# Patient Record
Sex: FEMALE | Race: BLACK OR AFRICAN AMERICAN | NOT HISPANIC OR LATINO | Employment: OTHER | URBAN - METROPOLITAN AREA
[De-identification: names, ages, dates, MRNs, and addresses within clinical notes are randomized per-mention and may not be internally consistent; named-entity substitution may affect disease eponyms.]

---

## 2017-12-30 ENCOUNTER — ALLSCRIPTS OFFICE VISIT (OUTPATIENT)
Dept: OTHER | Facility: OTHER | Age: 62
End: 2017-12-30

## 2018-01-01 NOTE — PROGRESS NOTES
Assessment   1  Acute bronchitis due to other specified organisms (466 0) (J20 8)    Plan   Acute bronchitis due to other specified organisms    · Benzonatate 200 MG Oral Capsule; TAKE 1 CAPSULE 3 TIMES DAILY AS    NEEDED   · Doxycycline Hyclate 100 MG Oral Capsule; Take 1 capsule twice daily  Contusion of right knee, initial encounter    · Meloxicam 15 MG Oral Tablet; TAKE 1 TABLET DAILY WITH FOOD  Localized swelling of both lower legs    · DANA Stockings; Status:Complete;   Done: [id number]    Discussion/Summary      69-year-old female here today for concern of persistent 2 weeks of intermittent fever, constitutional symptoms and upper respiratory symptoms  Based on persistency of symptoms I will have her complete a course of antibiotics, doxycycline b i d  times 10 days  I also prescribed her cough medicine, benzonatate cough pills, to suppress cough  She was advised to use Tylenol as needed for aches/pains or fever  She is to try Mucinex as well as a steroid nasal spray like fluticasone to help with runny nose and congestion  We will see how she does through the course of the next week or so and was advised to call or follow up in the office should symptoms persist or worsen despite treatment  Possible side effects of new medications were reviewed with the patient/guardian today  The treatment plan was reviewed with the patient/guardian  The patient/guardian understands and agrees with the treatment plan      Chief Complaint   Pt presents for cough, fever, body aches, sob x2wks  Pt states shes taken Advil, Tylenol, alkazeler, and Mucinex w/ little relief  Pt c/o of R leg pain x2d after having leg closed in door  History of Present Illness   HPI: 60y/o female here today for URI sxs for past 2 weeks  Reports fevers, chills, body aches  Fevers 99 last week and now 102  Also coughing worse, deep, chest congestion  Does feel SOB  She has tried advil cold and sinus, alkaseltzer cold, mucinex  takes tylenol  Denies nasal sxs or ear pain  Review of Systems        Constitutional: as noted in HPI       ENT: as noted in HPI  Cardiovascular: no complaints of slow or fast heart rate, no chest pain, no palpitations, no leg claudication or lower extremity edema  Respiratory: as noted in HPI  Gastrointestinal: no complaints of abdominal pain, no constipation, no nausea or diarrhea, no vomiting, no bloody stools  Active Problems   1  Menopause (627 2) (Z78 0)    Past Medical History   1  History of Encounter for screening mammogram for malignant neoplasm of breast     (V76 12) (Z12 31)   2  History of Female pelvic pain (625 9) (R10 2)   3  History of vaginal discharge (V13 29) (Z87 42)   4  History of Vaginal irritation (623 9) (N89 8)   5  History of Vaginal odor (625 8) (N89 8)   6  History of Vaginal pain (625 9) (R10 2)    Family History   Mother    1  Family history of Breast cancer (174 9) (C50 919)   2  Family history of diabetes mellitus in mother (V18 0) (Z83 3)  Maternal Grandmother    3  Family history of Breast cancer (174 9) (C50 919)  Paternal Grandmother    4  Family history of diabetes mellitus in mother (V18 0) (Z83 3)  Family History    5  Family history of malignant neoplasm (V16 9) (Z80 9)    Social History    · Never a smoker   · No alcohol use  The social history was reviewed and updated today  Surgical History   1  History of  Section   2  History of Hysterectomy    Current Meds      The medication list was reviewed and updated today  Allergies   1   Statins    Vitals    Recorded: [id number] 10:44AM   Temperature 100 9 F, Tympanic   Heart Rate 92   Pulse Quality Normal   Respiration 16   Systolic 882, LUE, Sitting   Diastolic 76, LUE, Sitting   Height 5 ft 0 5 in   Weight 165 lb 9 oz   BMI Calculated 31 8   BSA Calculated 1 73   O2 Saturation 99, RA   LMP Menopause   Pain Scale 8     Physical Exam        Constitutional      General appearance: Abnormal  acutely ill,-- comfortable,-- appears tired-- and-- appearance reflects stated age  Ears, Nose, Mouth, and Throat      External inspection of ears and nose: Normal        Otoscopic examination: Tympanic membranes translucent with normal light reflex  Canals patent without erythema  Nasal mucosa, septum, and turbinates: Abnormal   normal nasal turbinates  There was a mucoid discharge from both nares  The bilateral nasal mucosa was boggy,-- edematous-- and-- red  Oropharynx: Abnormal  -- PND  Pulmonary      Respiratory effort: Abnormal  -- occasional dry hacking cough  Auscultation of lungs: Clear to auscultation  Cardiovascular      Auscultation of heart: Normal rate and rhythm, normal S1 and S2, without murmurs  Lymphatic      Palpation of lymph nodes in neck: No lymphadenopathy  Psychiatric      Orientation to person, place, and time: Normal        Mood and affect: Normal        Additional Exam:  vitals reviewed - febrile  Results/Data   PHQ-9 Adult Depression Screening [id number] 10:54AM User, Ahs      Test Name Result Flag Reference   PHQ-9 Adult Depression Score 3     Over the last two weeks, how often have you been bothered by any of the following problems? Little interest or pleasure in doing things: Not at all - 0     Feeling down, depressed, or hopeless: Not at all - 0     Trouble falling or staying asleep, or sleeping too much: More than half the days - 2     Feeling tired or having little energy: Several days - 1     Poor appetite or over eating: Not at all - 0     Feeling bad about yourself - or that you are a failure or have let yourself or your family down: Not at all - 0     Trouble concentrating on things, such as reading the newspaper or watching television: Not at all - 0     Moving or speaking so slowly that other people could have noticed   Or the opposite -  being so fidgety or restless that you have been moving around a lot more than usual: Not at all - 0     Thoughts that you would be better off dead, or of hurting yourself in some way: Not at all - 0   PHQ-9 Adult Depression Screening Negative     PHQ-9 Difficulty Level Not difficult at all     PHQ-9 Severity Minimal Depression          Signatures    Electronically signed by : Angie Cevallos, [facility]; Dec 30 2017  1:47PM EST                       (Author)     Electronically signed by :  Andreina Leyva DO; Dec 31 2017  1:55PM EST                       (Author)

## 2018-01-01 NOTE — PROGRESS NOTES
Assessment   1  Acute bronchitis due to other specified organisms (466 0) (J20 8)    Plan   Acute bronchitis due to other specified organisms    · Benzonatate 200 MG Oral Capsule; TAKE 1 CAPSULE 3 TIMES DAILY AS    NEEDED   · Doxycycline Hyclate 100 MG Oral Capsule; Take 1 capsule twice daily  Contusion of right knee, initial encounter    · Meloxicam 15 MG Oral Tablet; TAKE 1 TABLET DAILY WITH FOOD  Localized swelling of both lower legs    · AVERY Stockings; Status:Complete;   Done: [id number]    Discussion/Summary      16-year-old female here today for evaluation of a right medial knee contusion after she states her knee was accidentally struck by a moving car door about 2-3 days ago  She has a slightly ecchymotic, swollen bruise along her right medial knee with no other obvious signs of trauma  She is able to move her knee without any pain or instability though the affected area is tender  At this point I likely suspect a contusion with reassurance given  She is requesting an anti-inflammatory so I will have her use meloxicam once daily with food for the next week or 2 based on how she feels in her pain level  She is able to bear weight fine and I do not feel any further imaging or evaluation is necessary at this present time based on her current exam  Side effects and risks were discussed with meloxicam and was advised to take with food  We will see how she does through the course of the next week or 2 and she was advised to follow up with any concerns  also states that she would like a prescription for Avery stockings to take to a medical supply store for measurements  She states that at times she gets problems with some swelling in her legs after standing all day at work  Printed prescription for that given  Possible side effects of new medications were reviewed with the patient/guardian today  The treatment plan was reviewed with the patient/guardian   The patient/guardian understands and agrees with the treatment plan      Chief Complaint   hit right knee on car door a few days ago, sore      History of Present Illness   HPI: 60y/o female here today for black and blue right medial knee, happened about 2-3 days ago  Slammed right knee into the car door  hx of right knee surgery  area tender, swollen, painful  no instability  wants DANA stockings script to take to medical supply store  states she stands all day  Review of Systems        Constitutional: No fever, no chills, feels well, no tiredness, no recent weight gain or loss  Cardiovascular: lower extremity edema  Respiratory: no complaints of shortness of breath, no wheezing, no dyspnea on exertion, no orthopnea or PND  Gastrointestinal: no complaints of abdominal pain, no constipation, no nausea or diarrhea, no vomiting, no bloody stools  Genitourinary: no complaints of dysuria, no incontinence, no pelvic pain, no dysmenorrhea, no vaginal discharge or abnormal vaginal bleeding  Musculoskeletal: as noted in HPI  Integumentary: as noted in HPI  Active Problems   1  Influenza vaccine needed (V04 81) (Z23)   2  Menopause (627 2) (Z78 0)    Past Medical History   1  History of Encounter for screening mammogram for malignant neoplasm of breast     (V76 12) (Z12 31)   2  History of Female pelvic pain (625 9) (R10 2)   3  History of vaginal discharge (V13 29) (Z87 42)   4  History of Vaginal irritation (623 9) (N89 8)   5  History of Vaginal odor (625 8) (N89 8)   6  History of Vaginal pain (625 9) (R10 2)    Family History   Mother    1  Family history of Breast cancer (174 9) (C50 919)   2  Family history of diabetes mellitus in mother (V18 0) (Z83 3)  Maternal Grandmother    3  Family history of Breast cancer (174 9) (C50 919)  Paternal Grandmother    4  Family history of diabetes mellitus in mother (V18 0) (Z83 3)  Family History    5   Family history of malignant neoplasm (V16 9) (Z80 9)    Social History    · Never a smoker   · No alcohol use  The social history was reviewed and updated today  Surgical History   1  History of  Section   2  History of Hysterectomy  Surgical History Reviewed: The surgical history was reviewed and updated today  Current Meds      The medication list was reviewed and updated today  Allergies   1  Statins    Vitals    Recorded: 06Ini5766 10:44AM   Temperature 100 9 F, Tympanic   Heart Rate 92   Pulse Quality Normal   Respiration 16   Systolic 038, LUE, Sitting   Diastolic 76, LUE, Sitting   Height 5 ft 0 5 in   Weight 165 lb 9 oz   BMI Calculated 31 8   BSA Calculated 1 73   O2 Saturation 99, RA   LMP Menopause   Pain Scale 8     Physical Exam        Constitutional      General appearance: No acute distress, well appearing and well nourished  comfortable  Cardiovascular      Auscultation of heart: Normal rate and rhythm, normal S1 and S2, without murmurs  Examination of extremities for edema and/or varicosities: Normal        Carotid pulses: Normal        Lymphatic      Palpation of lymph nodes in neck: No lymphadenopathy  Musculoskeletal      Gait and station: Normal        Inspection/palpation of joints, bones, and muscles: Abnormal  -- small area of ecchymosis right medial knee, tender to touch  no other visible trauma noted  scar noted anterior right knee  normal movements of right knee  no instability  Skin      Skin and subcutaneous tissue: Abnormal  -- ecchymosis as above  Psychiatric      Orientation to person, place, and time: Normal        Mood and affect: Normal        Additional Exam:  vitals reviewed  Results/Data   PHQ-9 Adult Depression Screening [id number] 10:54AM User, Ahs      Test Name Result Flag Reference   PHQ-9 Adult Depression Score 3     Over the last two weeks, how often have you been bothered by any of the following problems?      Little interest or pleasure in doing things: Not at all - 0     Feeling down, depressed, or hopeless: Not at all - 0     Trouble falling or staying asleep, or sleeping too much: More than half the days - 2     Feeling tired or having little energy: Several days - 1     Poor appetite or over eating: Not at all - 0     Feeling bad about yourself - or that you are a failure or have let yourself or your family down: Not at all - 0     Trouble concentrating on things, such as reading the newspaper or watching television: Not at all - 0     Moving or speaking so slowly that other people could have noticed  Or the opposite -  being so fidgety or restless that you have been moving around a lot more than usual: Not at all - 0     Thoughts that you would be better off dead, or of hurting yourself in some way: Not at all - 0   PHQ-9 Adult Depression Screening Negative     PHQ-9 Difficulty Level Not difficult at all     PHQ-9 Severity Minimal Depression          Signatures    Electronically signed by : Bee Officer, [facility]; Dec 30 2017  1:44PM EST                       (Author)     Electronically signed by :  Emerson Stephen DO; Dec 31 2017  1:55PM EST                       (Author)

## 2018-01-23 VITALS
OXYGEN SATURATION: 99 % | TEMPERATURE: 100.9 F | HEIGHT: 61 IN | DIASTOLIC BLOOD PRESSURE: 76 MMHG | BODY MASS INDEX: 31.26 KG/M2 | SYSTOLIC BLOOD PRESSURE: 110 MMHG | WEIGHT: 165.56 LBS | RESPIRATION RATE: 16 BRPM | HEART RATE: 92 BPM

## 2019-01-18 ENCOUNTER — OFFICE VISIT (OUTPATIENT)
Dept: FAMILY MEDICINE CLINIC | Facility: CLINIC | Age: 64
End: 2019-01-18
Payer: COMMERCIAL

## 2019-01-18 VITALS
RESPIRATION RATE: 15 BRPM | HEART RATE: 69 BPM | TEMPERATURE: 98.9 F | DIASTOLIC BLOOD PRESSURE: 72 MMHG | OXYGEN SATURATION: 97 % | HEIGHT: 60 IN | SYSTOLIC BLOOD PRESSURE: 128 MMHG | WEIGHT: 168.5 LBS | BODY MASS INDEX: 33.08 KG/M2

## 2019-01-18 DIAGNOSIS — J40 BRONCHITIS: Primary | ICD-10-CM

## 2019-01-18 PROCEDURE — 1036F TOBACCO NON-USER: CPT | Performed by: NURSE PRACTITIONER

## 2019-01-18 PROCEDURE — 3008F BODY MASS INDEX DOCD: CPT | Performed by: NURSE PRACTITIONER

## 2019-01-18 PROCEDURE — 99213 OFFICE O/P EST LOW 20 MIN: CPT | Performed by: NURSE PRACTITIONER

## 2019-01-18 RX ORDER — AZITHROMYCIN 250 MG/1
TABLET, FILM COATED ORAL
Qty: 6 TABLET | Refills: 0 | Status: SHIPPED | OUTPATIENT
Start: 2019-01-18 | End: 2019-01-22

## 2019-01-18 RX ORDER — PREDNISONE 10 MG/1
TABLET ORAL
Qty: 15 TABLET | Refills: 0 | Status: SHIPPED | OUTPATIENT
Start: 2019-01-18 | End: 2019-10-25

## 2019-01-20 PROBLEM — M19.90 OSTEOARTHRITIS: Status: ACTIVE | Noted: 2019-01-20

## 2019-01-20 PROBLEM — K63.5 COLON POLYPS: Status: ACTIVE | Noted: 2019-01-20

## 2019-01-20 PROBLEM — R22.43 LOCALIZED SWELLING OF BOTH LOWER LEGS: Status: ACTIVE | Noted: 2017-12-30

## 2019-01-21 NOTE — PROGRESS NOTES
[facility]    ASSESSMENT AND PLAN     1  Bronchitis  21-year-old female presents today with signs and symptoms suggestive of bronchitis  Physical assessment is as documented below  Rx given today for azithromycin and a short 5 day course prednisone taper  Risks and benefits reviewed  Symptom management reviewed  Patient is to return to the office if her symptoms persist and/or worsen  - azithromycin (ZITHROMAX) 250 mg tablet; Take 2 tablets today then 1 tablet daily x 4 days  Dispense: 6 tablet; Refill: 0  - predniSONE 10 mg tablet; 50mg (5 tabs) x1 day, 40 mg x1 day, 30 mg x1 day, 20mg x1 day, 10mg x1 day  Dispense: 15 tablet; Refill: 0            SUBJECTIVE       Patient ID: Gabriele Guerrero is a 58 y o  female  Chief Complaint   Patient presents with    URI     Pt c/o cough with chest tightness with on and off SOB x1 week  HISTORY OF PRESENT ILLNESS    Patient presents today with a one-week history of productive cough and chest tightness  Last air to she has noticed some wheezing     She has trialed some OTC cold medicine without relief  She denies fever  Denies any GI distress          The following portions of the patient's history were reviewed and updated as appropriate: allergies, current medications, past medical history and problem list     REVIEW OF SYSTEMS  Review of Systems   Constitutional: Positive for activity change and fatigue  Negative for appetite change and fever  HENT: Positive for congestion, sinus pain, sinus pressure and sore throat  Negative for ear pain and postnasal drip  Respiratory: Positive for cough, chest tightness and wheezing  Negative for shortness of breath  Cardiovascular: Negative  Gastrointestinal: Negative  Psychiatric/Behavioral: Negative          OBJECTIVE      VITAL SIGNS  /72 (BP Location: Left arm, Patient Position: Sitting, Cuff Size: Standard)   Pulse 69   Temp 98 9 °F (37 2 °C) (Tympanic)   Resp 15   Ht 4' 11 65" (1 515 m)   Wt 76 4 kg (168 lb 8 oz)   LMP  (LMP Unknown)   SpO2 97%   BMI 33 30 kg/m²       PHYSICAL EXAMINATION   Physical Exam   Constitutional: She is oriented to person, place, and time  She appears well-developed and well-nourished  HENT:   Head: Normocephalic  Right Ear: Hearing, tympanic membrane, external ear and ear canal normal  No middle ear effusion  Left Ear: Hearing, tympanic membrane, external ear and ear canal normal   No middle ear effusion  Nose: No mucosal edema  Right sinus exhibits no maxillary sinus tenderness and no frontal sinus tenderness  Left sinus exhibits no maxillary sinus tenderness and no frontal sinus tenderness  Mouth/Throat: Oropharynx is clear and moist    Cardiovascular: Normal rate and regular rhythm  Pulmonary/Chest: Effort normal  No respiratory distress  She has no decreased breath sounds  She has wheezes in the right lower field and the left lower field  She has no rhonchi  She has no rales  Musculoskeletal: Normal range of motion  Lymphadenopathy:        Head (right side): No submental, no submandibular, no tonsillar, no preauricular, no posterior auricular and no occipital adenopathy present  Head (left side): Tonsillar adenopathy present  No submental, no submandibular, no preauricular, no posterior auricular and no occipital adenopathy present  She has no cervical adenopathy  Neurological: She is alert and oriented to person, place, and time  Skin: Skin is warm, dry and intact  Psychiatric: She has a normal mood and affect  Her speech is normal and behavior is normal  Judgment and thought content normal  Cognition and memory are normal    Nursing note and vitals reviewed

## 2019-05-16 ENCOUNTER — TELEPHONE (OUTPATIENT)
Dept: FAMILY MEDICINE CLINIC | Facility: CLINIC | Age: 64
End: 2019-05-16

## 2019-10-25 ENCOUNTER — OFFICE VISIT (OUTPATIENT)
Dept: FAMILY MEDICINE CLINIC | Facility: CLINIC | Age: 64
End: 2019-10-25
Payer: COMMERCIAL

## 2019-10-25 VITALS
HEART RATE: 71 BPM | BODY MASS INDEX: 34.27 KG/M2 | HEIGHT: 59 IN | WEIGHT: 170 LBS | OXYGEN SATURATION: 98 % | TEMPERATURE: 98 F | SYSTOLIC BLOOD PRESSURE: 120 MMHG | RESPIRATION RATE: 16 BRPM | DIASTOLIC BLOOD PRESSURE: 70 MMHG

## 2019-10-25 DIAGNOSIS — J40 BRONCHITIS: Primary | ICD-10-CM

## 2019-10-25 PROCEDURE — 1101F PT FALLS ASSESS-DOCD LE1/YR: CPT | Performed by: FAMILY MEDICINE

## 2019-10-25 PROCEDURE — 3008F BODY MASS INDEX DOCD: CPT | Performed by: FAMILY MEDICINE

## 2019-10-25 PROCEDURE — 99213 OFFICE O/P EST LOW 20 MIN: CPT | Performed by: FAMILY MEDICINE

## 2019-10-25 RX ORDER — PROMETHAZINE HYDROCHLORIDE AND CODEINE PHOSPHATE 6.25; 1 MG/5ML; MG/5ML
5 SYRUP ORAL EVERY 4 HOURS PRN
Qty: 120 ML | Refills: 0 | Status: SHIPPED | OUTPATIENT
Start: 2019-10-25 | End: 2019-12-20 | Stop reason: SDUPTHER

## 2019-10-25 RX ORDER — CEFDINIR 300 MG/1
300 CAPSULE ORAL EVERY 12 HOURS SCHEDULED
Qty: 20 CAPSULE | Refills: 0 | Status: SHIPPED | OUTPATIENT
Start: 2019-10-25 | End: 2019-11-04

## 2019-10-25 NOTE — PROGRESS NOTES
[facility]      NAME: Cherri Alberts  AGE: 71 y o  SEX: female  : 1955   MRN: [id number]    DATE: 10/25/2019  TIME: 10:21 AM    Assessment and Plan     Problem List Items Addressed This Visit     None      Visit Diagnoses     Bronchitis    -  Primary    Relevant Medications    cefdinir (OMNICEF) 300 mg capsule    promethazine-codeine (PHENERGAN WITH CODEINE) 6 25-10 mg/5 mL syrup              Return to office in:   P r n  Chief Complaint     Chief Complaint   Patient presents with    Cough     bodyache x 10 days       History of Present Illness     Patient has upper respiratory symptoms progress over over 10 days but most severe in the last 2-3 with fever chills productive cough and some shortness of breath  Taking over-the-counter cold remedies, Mucinex and decongestant  The following portions of the patient's history were reviewed and updated as appropriate: allergies, current medications, past family history, past medical history, past social history, past surgical history and problem list     Review of Systems   Review of Systems   Constitutional: Positive for fatigue and fever  HENT: Positive for postnasal drip, sinus pressure and sinus pain  Respiratory: Positive for cough and shortness of breath  Active Problem List     Patient Active Problem List   Diagnosis    Colon polyps    Hypercholesterolemia    Localized swelling of both lower legs    Menopause    Osteoarthritis    Atrophic vaginitis       Objective   /70 (BP Location: Left arm, Patient Position: Sitting, Cuff Size: Adult)   Pulse 71   Temp 98 °F (36 7 °C) (Tympanic)   Resp 16   Ht 4' 11 45" (1 51 m)   Wt 77 1 kg (170 lb)   SpO2 98%   BMI 33 82 kg/m²     Physical Exam   Constitutional: She is oriented to person, place, and time  She appears well-developed and well-nourished  No distress  HENT:   Head: Normocephalic and atraumatic     Injected posterior pharynx with postnasal drainage   Eyes: Pupils are equal, round, and reactive to light  Conjunctivae are normal  Right eye exhibits no discharge  Neck: Normal range of motion  No thyromegaly present  Cardiovascular: Normal rate and regular rhythm  Pulmonary/Chest: Effort normal  No respiratory distress  Coarse breath sounds with scattered rhonchi  Lymphadenopathy:     She has no cervical adenopathy  Neurological: She is alert and oriented to person, place, and time  Skin: Skin is warm and dry  She is not diaphoretic  Psychiatric: She has a normal mood and affect  Her behavior is normal  Judgment and thought content normal    Nursing note and vitals reviewed          Current Medications     Current Outpatient Medications:     aspirin 81 MG tablet, Take 81 mg by mouth, Disp: , Rfl:     cefdinir (OMNICEF) 300 mg capsule, Take 1 capsule (300 mg total) by mouth every 12 (twelve) hours for 10 days, Disp: 20 capsule, Rfl: 0    promethazine-codeine (PHENERGAN WITH CODEINE) 6 25-10 mg/5 mL syrup, Take 5 mL by mouth every 4 (four) hours as needed for cough, Disp: 120 mL, Rfl: 0    Health Maintenance     Health Maintenance   Topic Date Due    Hepatitis C Screening  1955    CRC Screening: Colonoscopy  1955    BMI: Followup Plan  09/15/1972    MAMMOGRAM  11/08/2017    Pneumococcal Vaccine: 65+ Years (1 of 2 - PCV13) 09/15/2019    Fall Risk  10/25/2020    Depression Screening PHQ  10/25/2020    Urinary Incontinence Screening  10/25/2020    BMI: Adult  10/25/2020    DTaP,Tdap,and Td Vaccines (2 - Td) 08/17/2028    INFLUENZA VACCINE  Completed    Pneumococcal Vaccine: Pediatrics (0 to 5 Years) and At-Risk Patients (6 to 59 Years)  Aged Out    HEPATITIS B VACCINES  Aged [employer] History   Administered Date(s) Administered    Hep A, ped/adol, 2 dose 01/13/2004    INFLUENZA 09/01/2013, 10/22/2013, 10/10/2014, 10/30/2017, 10/02/2018, 10/09/2018, 10/02/2019    Influenza Quadrivalent, 6-35 Months IM 10/01/2017    Td (adult), Unspecified 01/13/2005    Tdap 08/17/2018       Evelyn Willis DO  [facility]

## 2019-12-20 ENCOUNTER — OFFICE VISIT (OUTPATIENT)
Dept: FAMILY MEDICINE CLINIC | Facility: CLINIC | Age: 64
End: 2019-12-20
Payer: COMMERCIAL

## 2019-12-20 VITALS
WEIGHT: 167.8 LBS | SYSTOLIC BLOOD PRESSURE: 120 MMHG | HEIGHT: 59 IN | DIASTOLIC BLOOD PRESSURE: 66 MMHG | OXYGEN SATURATION: 92 % | HEART RATE: 65 BPM | TEMPERATURE: 98.2 F | BODY MASS INDEX: 33.83 KG/M2

## 2019-12-20 DIAGNOSIS — J32.9 SINUSITIS, UNSPECIFIED CHRONICITY, UNSPECIFIED LOCATION: Primary | ICD-10-CM

## 2019-12-20 PROCEDURE — 99214 OFFICE O/P EST MOD 30 MIN: CPT | Performed by: FAMILY MEDICINE

## 2019-12-20 RX ORDER — PROMETHAZINE HYDROCHLORIDE AND CODEINE PHOSPHATE 6.25; 1 MG/5ML; MG/5ML
5 SYRUP ORAL EVERY 4 HOURS PRN
Qty: 120 ML | Refills: 0 | Status: SHIPPED | OUTPATIENT
Start: 2019-12-20 | End: 2020-08-29

## 2019-12-20 RX ORDER — CEFDINIR 300 MG/1
300 CAPSULE ORAL EVERY 12 HOURS SCHEDULED
Qty: 20 CAPSULE | Refills: 0 | Status: SHIPPED | OUTPATIENT
Start: 2019-12-20 | End: 2019-12-30

## 2019-12-20 NOTE — PROGRESS NOTES
[facility]      NAME: Ko Ugarte  AGE: 71 y o  SEX: female  : 1955   MRN: [id number]    DATE: 2019  TIME: 4:40 PM    Assessment and Plan     Problem List Items Addressed This Visit     None      Visit Diagnoses     Sinusitis, unspecified chronicity, unspecified location    -  Primary    Relevant Medications    cefdinir (OMNICEF) 300 mg capsule    promethazine-codeine (PHENERGAN WITH CODEINE) 6 25-10 mg/5 mL syrup              Return to office in:   P r n  Chief Complaint     Chief Complaint   Patient presents with    Cough     pt c/o cough, nasal congestion, fever and bodyaches for about one week now   History of Present Illness     Patient complains of sore throat postnasal drainage and dry cough  Low-grade fever  Taking over-the-counter cough medicine and cold medication without improvement  Symptoms present 4-5 days  The following portions of the patient's history were reviewed and updated as appropriate: allergies, current medications, past family history, past medical history, past social history, past surgical history and problem list     Review of Systems   Review of Systems   Constitutional: Negative  HENT: Positive for congestion, postnasal drip, sinus pressure and sore throat  Respiratory: Positive for cough  Cardiovascular: Negative  Gastrointestinal: Negative  Genitourinary: Negative  Musculoskeletal: Negative  Psychiatric/Behavioral: Negative  Active Problem List     Patient Active Problem List   Diagnosis    Colon polyps    Hypercholesterolemia    Localized swelling of both lower legs    Menopause    Osteoarthritis    Atrophic vaginitis       Objective   /66 (BP Location: Left arm, Patient Position: Sitting, Cuff Size: Adult)   Pulse 65   Temp 98 2 °F (36 8 °C) (Tympanic)   Ht 4' 11" (1 499 m)   Wt 76 1 kg (167 lb 12 8 oz)   LMP  (LMP Unknown)   SpO2 92%   Breastfeeding?  No   BMI 33 89 kg/m² Physical Exam   Constitutional: She is oriented to person, place, and time  She appears well-developed and well-nourished  No distress  HENT:   Head: Normocephalic and atraumatic  Injected posterior pharynx with opaque postnasal drainage   Eyes: Pupils are equal, round, and reactive to light  Conjunctivae are normal  Right eye exhibits no discharge  Neck: Normal range of motion  No thyromegaly present  Cardiovascular: Normal rate and regular rhythm  Pulmonary/Chest: Effort normal and breath sounds normal  No respiratory distress  Lymphadenopathy:     She has no cervical adenopathy  Neurological: She is alert and oriented to person, place, and time  Skin: Skin is warm and dry  She is not diaphoretic  Psychiatric: She has a normal mood and affect  Her behavior is normal  Judgment and thought content normal    Nursing note and vitals reviewed          Current Medications     Current Outpatient Medications:     aspirin 81 MG tablet, Take 81 mg by mouth, Disp: , Rfl:     cefdinir (OMNICEF) 300 mg capsule, Take 1 capsule (300 mg total) by mouth every 12 (twelve) hours for 10 days, Disp: 20 capsule, Rfl: 0    promethazine-codeine (PHENERGAN WITH CODEINE) 6 25-10 mg/5 mL syrup, Take 5 mL by mouth every 4 (four) hours as needed for cough, Disp: 120 mL, Rfl: 0    Health Maintenance     Health Maintenance   Topic Date Due    Hepatitis C Screening  1955    HIV Screening  09/15/1969    BMI: Followup Plan  09/15/1972    MAMMOGRAM  11/08/2017    Fall Risk  10/25/2020    Depression Screening PHQ  10/25/2020    Urinary Incontinence Screening  10/25/2020    Pneumococcal Vaccine: 65+ Years (2 of 2 - PPSV23) 11/07/2020    BMI: Adult  12/20/2020    DTaP,Tdap,and Td Vaccines (2 - Td) 08/17/2028    CRC Screening: Colonoscopy  08/02/2029    Influenza Vaccine  Completed    Pneumococcal Vaccine: Pediatrics (0 to 5 Years) and At-Risk Patients (6 to 59 Years)  Aged Out    HIB Vaccine  Aged Maikel Weaver Hepatitis B Vaccine  Aged Out    IPV Vaccine  Aged Out    Hepatitis A Vaccine  Aged Out    Meningococcal ACWY Vaccine  Aged Out    HPV Vaccine  Aged Out     Immunization History   Administered Date(s) Administered    Hep A, ped/adol, 2 dose 01/13/2004    INFLUENZA 09/01/2013, 10/22/2013, 10/10/2014, 10/30/2017, 10/02/2018, 10/09/2018, 10/02/2019    Influenza Quadrivalent, 6-35 Months IM 10/01/2017    Pneumococcal Conjugate 13-Valent 11/07/2019    Td (adult), Unspecified 01/13/2005    Tdap 08/17/2018       Bryson Rodgers, DO  [facility]

## 2019-12-20 NOTE — LETTER
December 20, 2019     Patient: Brii Hall   YOB: 1955   Date of Visit: 12/20/2019       To Whom it May Concern:    Juliet Strickland is under my professional care  She was seen in my office on 12/20/2019  She may return to work on 12/23  If you have any questions or concerns, please don't hesitate to call           Sincerely,          Josse Cowan,         CC: No Recipients

## 2020-08-29 ENCOUNTER — OFFICE VISIT (OUTPATIENT)
Dept: FAMILY MEDICINE CLINIC | Facility: CLINIC | Age: 65
End: 2020-08-29
Payer: COMMERCIAL

## 2020-08-29 VITALS
RESPIRATION RATE: 16 BRPM | SYSTOLIC BLOOD PRESSURE: 120 MMHG | TEMPERATURE: 98 F | BODY MASS INDEX: 31.91 KG/M2 | HEART RATE: 70 BPM | DIASTOLIC BLOOD PRESSURE: 70 MMHG | HEIGHT: 61 IN | WEIGHT: 169 LBS | OXYGEN SATURATION: 99 %

## 2020-08-29 DIAGNOSIS — J01.00 ACUTE NON-RECURRENT MAXILLARY SINUSITIS: ICD-10-CM

## 2020-08-29 DIAGNOSIS — Z71.89 ADVICE GIVEN ABOUT COVID-19 VIRUS INFECTION: ICD-10-CM

## 2020-08-29 DIAGNOSIS — G89.29 CHRONIC PAIN OF LEFT KNEE: Primary | ICD-10-CM

## 2020-08-29 DIAGNOSIS — M25.562 CHRONIC PAIN OF LEFT KNEE: Primary | ICD-10-CM

## 2020-08-29 PROCEDURE — 1036F TOBACCO NON-USER: CPT | Performed by: FAMILY MEDICINE

## 2020-08-29 PROCEDURE — 4040F PNEUMOC VAC/ADMIN/RCVD: CPT | Performed by: FAMILY MEDICINE

## 2020-08-29 PROCEDURE — 1160F RVW MEDS BY RX/DR IN RCRD: CPT | Performed by: FAMILY MEDICINE

## 2020-08-29 PROCEDURE — 99214 OFFICE O/P EST MOD 30 MIN: CPT | Performed by: FAMILY MEDICINE

## 2020-08-29 PROCEDURE — 3008F BODY MASS INDEX DOCD: CPT | Performed by: FAMILY MEDICINE

## 2020-08-29 RX ORDER — CEFUROXIME AXETIL 500 MG/1
500 TABLET ORAL EVERY 12 HOURS SCHEDULED
Qty: 20 TABLET | Refills: 0 | Status: SHIPPED | OUTPATIENT
Start: 2020-08-29 | End: 2020-09-08

## 2020-08-29 RX ORDER — FEXOFENADINE HCL 180 MG/1
180 TABLET ORAL DAILY
COMMUNITY
Start: 2020-04-14 | End: 2021-08-14 | Stop reason: ALTCHOICE

## 2020-08-29 RX ORDER — PROMETHAZINE HYDROCHLORIDE AND CODEINE PHOSPHATE 6.25; 1 MG/5ML; MG/5ML
5 SYRUP ORAL EVERY 4 HOURS PRN
Qty: 120 ML | Refills: 0 | Status: SHIPPED | OUTPATIENT
Start: 2020-08-29 | End: 2021-10-27 | Stop reason: ALTCHOICE

## 2020-08-29 RX ORDER — TERBINAFINE HYDROCHLORIDE 250 MG/1
2 TABLET ORAL DAILY
COMMUNITY
Start: 2020-08-26 | End: 2021-10-27 | Stop reason: ALTCHOICE

## 2020-08-29 NOTE — PROGRESS NOTES
[facility]      NAME: Shirin Wheeler  AGE: 71 y o  SEX: female  : 1955   MRN: [id number]    DATE: 2020  TIME: 3:09 PM    Assessment and Plan     Problem List Items Addressed This Visit     Chronic pain of left knee - Primary      Pt presents w/ ongoing L knee pain x years, but lately getting worse  Pt was seen at [facility]  She states she had xrays done approx 2 weeks ago  Has tried OTC meds w/o benefit  I was able to view results of x-ray she had done on   This was read as normal   Therefore, symptoms are most likely soft tissue in origin  Will give Rx for Voltaren gel  We will refer to physical therapy  Possible need for MRI if symptoms persist             Relevant Medications    diclofenac sodium (VOLTAREN) 1 %    Other Relevant Orders    Ambulatory referral to Physical Therapy      Other Visit Diagnoses     Acute non-recurrent maxillary sinusitis         Rx given for Ceftin 500 b i d  times 10 days  Call further problems    Relevant Medications    cefuroxime (CEFTIN) 500 mg tablet    promethazine-codeine (PHENERGAN WITH CODEINE) 6 25-10 mg/5 mL syrup    Advice given about COVID-19 virus infection         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  She states she had xrays done approx 2 weeks ago  Has tried OTC meds w/o benefit  Pt c/o cough, nasal congestion x 3 weeks  Hx seasonal allergies  No fevers  Pt takes care of her 81 yo mother  She is requesting COVID testing as  A precaution  The following portions of the patient's history were reviewed and updated as appropriate: allergies, current medications, past family history, past medical history, past social history, past surgical history and problem list     Review of Systems   Review of Systems   Constitutional: Negative for chills and fever  HENT: Positive for congestion and postnasal drip  Respiratory: Negative  Negative for shortness of breath  Cardiovascular: Negative  Gastrointestinal: Negative  Genitourinary: Negative  Musculoskeletal: Positive for arthralgias  Active Problem List     Patient Active Problem List   Diagnosis    Colon polyps    Hypercholesterolemia    Localized swelling of both lower legs    Menopause    Osteoarthritis    Atrophic vaginitis    Chronic pain of left knee       Objective   /70 (BP Location: Left arm, Patient Position: Sitting, Cuff Size: Large)   Pulse 70   Temp 98 °F (36 7 °C) (Tympanic)   Resp 16   Ht 5' 0 5" (1 537 m)   Wt 76 7 kg (169 lb)   LMP  (LMP Unknown)   SpO2 99%   BMI 32 46 kg/m²     Physical Exam  Constitutional:       Appearance: She is well-developed  Neck:      Musculoskeletal: Normal range of motion and neck supple  Pulmonary:      Effort: Pulmonary effort is normal       Breath sounds: Normal breath sounds           Pertinent Laboratory/Diagnostic Studies:  none    Current Medications     Current Outpatient Medications:     aspirin 81 MG tablet, Take 81 mg by mouth, Disp: , Rfl:     fexofenadine (ALLEGRA) 180 MG tablet, Take 180 mg by mouth daily, Disp: , Rfl:     Omega-3 Fatty Acids (OMEGA 3 PO), Take 1 capsule by mouth daily, Disp: , Rfl:     terbinafine (LamISIL) 250 mg tablet, Take 2 tablets by mouth daily, Disp: , Rfl:     cefuroxime (CEFTIN) 500 mg tablet, Take 1 tablet (500 mg total) by mouth every 12 (twelve) hours for 10 days, Disp: 20 tablet, Rfl: 0    diclofenac sodium (VOLTAREN) 1 %, Apply 4 g topically 3 (three) times a day, Disp: 100 g, Rfl: 3    promethazine-codeine (PHENERGAN WITH CODEINE) 6 25-10 mg/5 mL syrup, Take 5 mL by mouth every 4 (four) hours as needed for cough, Disp: 120 mL, Rfl: 0    Health Maintenance     Health Maintenance   Topic Date Due    Hepatitis C Screening  1955    HIV Screening  09/15/1969    BMI: Followup Plan  09/15/1972    Annual Physical  09/15/1972    Influenza Vaccine  07/01/2020    MAMMOGRAM  09/19/2020    Fall Risk  10/25/2020    Depression Screening PHQ  10/25/2020    Pneumococcal Vaccine: 65+ Years (2 of 2 - PPSV23) 11/07/2020    BMI: Adult  08/29/2021    DTaP,Tdap,and Td Vaccines (2 - Td) 08/17/2028    Colorectal Cancer Screening  08/02/2029    HIB Vaccine  Aged Out    Hepatitis B Vaccine  Aged Out    IPV Vaccine  Aged Out    Hepatitis A Vaccine  Aged Out    Meningococcal ACWY Vaccine  Aged Out    HPV Vaccine  Aged [employer] History   Administered Date(s) Administered    Hep A, ped/adol, 2 dose 01/13/2004    INFLUENZA 09/01/2013, 10/22/2013, 10/10/2014, 10/30/2017, 10/02/2018, 10/09/2018, 10/02/2019    Influenza Quadrivalent, 6-35 Months IM 10/01/2017    Pneumococcal Conjugate 13-Valent 11/07/2019    Td (adult), Unspecified 01/13/2005    Tdap 08/17/2018       Rain Walker DO  [facility]

## 2020-08-29 NOTE — ASSESSMENT & PLAN NOTE
Pt presents w/ ongoing L knee pain x years, but lately getting worse  Pt was seen at [facility]  She states she had xrays done approx 2 weeks ago  Has tried OTC meds w/o benefit  I was able to view results of x-ray she had done on July 25th  This was read as normal   Therefore, symptoms are most likely soft tissue in origin  Will give Rx for Voltaren gel  We will refer to physical therapy    Possible need for MRI if symptoms persist

## 2020-09-02 DIAGNOSIS — Z71.89 ADVICE GIVEN ABOUT COVID-19 VIRUS INFECTION: ICD-10-CM

## 2020-09-02 PROCEDURE — U0003 INFECTIOUS AGENT DETECTION BY NUCLEIC ACID (DNA OR RNA); SEVERE ACUTE RESPIRATORY SYNDROME CORONAVIRUS 2 (SARS-COV-2) (CORONAVIRUS DISEASE [COVID-19]), AMPLIFIED PROBE TECHNIQUE, MAKING USE OF HIGH THROUGHPUT TECHNOLOGIES AS DESCRIBED BY CMS-2020-01-R: HCPCS | Performed by: FAMILY MEDICINE

## 2020-09-03 LAB — SARS-COV-2 RNA SPEC QL NAA+PROBE: NOT DETECTED

## 2020-10-05 ENCOUNTER — EVALUATION (OUTPATIENT)
Dept: PHYSICAL THERAPY | Facility: CLINIC | Age: 65
End: 2020-10-05
Payer: COMMERCIAL

## 2020-10-05 DIAGNOSIS — G89.29 CHRONIC PAIN OF LEFT KNEE: Primary | ICD-10-CM

## 2020-10-05 DIAGNOSIS — M25.562 CHRONIC PAIN OF LEFT KNEE: Primary | ICD-10-CM

## 2020-10-05 PROCEDURE — 97162 PT EVAL MOD COMPLEX 30 MIN: CPT | Performed by: PHYSICAL THERAPIST

## 2020-10-05 PROCEDURE — 97110 THERAPEUTIC EXERCISES: CPT | Performed by: PHYSICAL THERAPIST

## 2020-10-08 ENCOUNTER — APPOINTMENT (OUTPATIENT)
Dept: PHYSICAL THERAPY | Facility: CLINIC | Age: 65
End: 2020-10-08
Payer: COMMERCIAL

## 2020-10-12 ENCOUNTER — OFFICE VISIT (OUTPATIENT)
Dept: PHYSICAL THERAPY | Facility: CLINIC | Age: 65
End: 2020-10-12
Payer: COMMERCIAL

## 2020-10-12 DIAGNOSIS — M25.562 CHRONIC PAIN OF LEFT KNEE: Primary | ICD-10-CM

## 2020-10-12 DIAGNOSIS — G89.29 CHRONIC PAIN OF LEFT KNEE: Primary | ICD-10-CM

## 2020-10-12 PROCEDURE — 97110 THERAPEUTIC EXERCISES: CPT

## 2020-10-12 PROCEDURE — 97112 NEUROMUSCULAR REEDUCATION: CPT

## 2020-10-12 PROCEDURE — 97140 MANUAL THERAPY 1/> REGIONS: CPT

## 2020-10-22 ENCOUNTER — OFFICE VISIT (OUTPATIENT)
Dept: PHYSICAL THERAPY | Facility: CLINIC | Age: 65
End: 2020-10-22
Payer: COMMERCIAL

## 2020-10-22 DIAGNOSIS — G89.29 CHRONIC PAIN OF LEFT KNEE: Primary | ICD-10-CM

## 2020-10-22 DIAGNOSIS — M25.562 CHRONIC PAIN OF LEFT KNEE: Primary | ICD-10-CM

## 2020-10-22 PROCEDURE — 97110 THERAPEUTIC EXERCISES: CPT

## 2020-10-22 PROCEDURE — 97140 MANUAL THERAPY 1/> REGIONS: CPT

## 2020-10-22 PROCEDURE — 97112 NEUROMUSCULAR REEDUCATION: CPT

## 2020-10-26 ENCOUNTER — APPOINTMENT (OUTPATIENT)
Dept: PHYSICAL THERAPY | Facility: CLINIC | Age: 65
End: 2020-10-26
Payer: COMMERCIAL

## 2020-10-29 ENCOUNTER — APPOINTMENT (OUTPATIENT)
Dept: PHYSICAL THERAPY | Facility: CLINIC | Age: 65
End: 2020-10-29
Payer: COMMERCIAL

## 2020-11-23 ENCOUNTER — TELEMEDICINE (OUTPATIENT)
Dept: FAMILY MEDICINE CLINIC | Facility: CLINIC | Age: 65
End: 2020-11-23
Payer: COMMERCIAL

## 2020-11-23 DIAGNOSIS — J01.40 ACUTE NON-RECURRENT PANSINUSITIS: Primary | ICD-10-CM

## 2020-11-23 PROBLEM — R21 RASH OF PENIS: Status: ACTIVE | Noted: 2020-11-23

## 2020-11-23 PROBLEM — R21 RASH OF PENIS: Status: RESOLVED | Noted: 2020-11-23 | Resolved: 2020-11-23

## 2020-11-23 PROBLEM — D22.9 PIGMENTED NEVUS: Status: ACTIVE | Noted: 2020-08-12

## 2020-11-23 PROBLEM — B35.1 ONYCHOMYCOSIS: Status: ACTIVE | Noted: 2020-11-23

## 2020-11-23 PROBLEM — B07.0 PLANTAR WART: Status: ACTIVE | Noted: 2020-11-23

## 2020-11-23 PROBLEM — M20.11 HALLUX VALGUS OF RIGHT FOOT: Status: ACTIVE | Noted: 2020-11-16

## 2020-11-23 PROBLEM — E66.9 OBESITY: Status: ACTIVE | Noted: 2020-11-23

## 2020-11-23 PROCEDURE — 99213 OFFICE O/P EST LOW 20 MIN: CPT | Performed by: FAMILY MEDICINE

## 2020-11-23 PROCEDURE — 1160F RVW MEDS BY RX/DR IN RCRD: CPT | Performed by: FAMILY MEDICINE

## 2020-11-23 PROCEDURE — 1036F TOBACCO NON-USER: CPT | Performed by: FAMILY MEDICINE

## 2020-11-23 RX ORDER — DOXYCYCLINE HYCLATE 100 MG/1
100 CAPSULE ORAL EVERY 12 HOURS SCHEDULED
Qty: 20 CAPSULE | Refills: 0 | Status: SHIPPED | OUTPATIENT
Start: 2020-11-23 | End: 2020-12-03

## 2020-11-24 DIAGNOSIS — J01.40 ACUTE NON-RECURRENT PANSINUSITIS: ICD-10-CM

## 2020-11-24 PROCEDURE — U0003 INFECTIOUS AGENT DETECTION BY NUCLEIC ACID (DNA OR RNA); SEVERE ACUTE RESPIRATORY SYNDROME CORONAVIRUS 2 (SARS-COV-2) (CORONAVIRUS DISEASE [COVID-19]), AMPLIFIED PROBE TECHNIQUE, MAKING USE OF HIGH THROUGHPUT TECHNOLOGIES AS DESCRIBED BY CMS-2020-01-R: HCPCS | Performed by: FAMILY MEDICINE

## 2020-11-26 LAB — SARS-COV-2 RNA SPEC QL NAA+PROBE: NOT DETECTED

## 2020-12-07 ENCOUNTER — TELEMEDICINE (OUTPATIENT)
Dept: FAMILY MEDICINE CLINIC | Facility: CLINIC | Age: 65
End: 2020-12-07
Payer: COMMERCIAL

## 2020-12-07 DIAGNOSIS — R73.09 ELEVATED HEMOGLOBIN A1C: ICD-10-CM

## 2020-12-07 DIAGNOSIS — R05.9 COUGH: Primary | ICD-10-CM

## 2020-12-07 PROCEDURE — 99213 OFFICE O/P EST LOW 20 MIN: CPT | Performed by: FAMILY MEDICINE

## 2020-12-09 ENCOUNTER — APPOINTMENT (OUTPATIENT)
Dept: RADIOLOGY | Facility: CLINIC | Age: 65
End: 2020-12-09
Payer: COMMERCIAL

## 2020-12-09 ENCOUNTER — LAB (OUTPATIENT)
Dept: LAB | Facility: CLINIC | Age: 65
End: 2020-12-09
Payer: COMMERCIAL

## 2020-12-09 DIAGNOSIS — R05.9 COUGH: ICD-10-CM

## 2020-12-09 DIAGNOSIS — R73.09 ELEVATED HEMOGLOBIN A1C: ICD-10-CM

## 2020-12-09 LAB
ALBUMIN SERPL BCP-MCNC: 3.7 G/DL (ref 3.5–5)
ALP SERPL-CCNC: 99 U/L (ref 46–116)
ALT SERPL W P-5'-P-CCNC: 23 U/L (ref 12–78)
ANION GAP SERPL CALCULATED.3IONS-SCNC: 4 MMOL/L (ref 4–13)
AST SERPL W P-5'-P-CCNC: 9 U/L (ref 5–45)
BASOPHILS # BLD AUTO: 0.03 THOUSANDS/ΜL (ref 0–0.1)
BASOPHILS NFR BLD AUTO: 1 % (ref 0–1)
BILIRUB SERPL-MCNC: 0.23 MG/DL (ref 0.2–1)
BUN SERPL-MCNC: 10 MG/DL (ref 5–25)
CALCIUM SERPL-MCNC: 9.4 MG/DL (ref 8.3–10.1)
CHLORIDE SERPL-SCNC: 106 MMOL/L (ref 100–108)
CO2 SERPL-SCNC: 30 MMOL/L (ref 21–32)
CREAT SERPL-MCNC: 0.61 MG/DL (ref 0.6–1.3)
EOSINOPHIL # BLD AUTO: 0.32 THOUSAND/ΜL (ref 0–0.61)
EOSINOPHIL NFR BLD AUTO: 6 % (ref 0–6)
ERYTHROCYTE [DISTWIDTH] IN BLOOD BY AUTOMATED COUNT: 15.5 % (ref 11.6–15.1)
EST. AVERAGE GLUCOSE BLD GHB EST-MCNC: 114 MG/DL
GFR SERPL CREATININE-BSD FRML MDRD: 109 ML/MIN/1.73SQ M
GLUCOSE P FAST SERPL-MCNC: 77 MG/DL (ref 65–99)
HBA1C MFR BLD: 5.6 %
HCT VFR BLD AUTO: 43.1 % (ref 34.8–46.1)
HGB BLD-MCNC: 13.2 G/DL (ref 11.5–15.4)
IMM GRANULOCYTES # BLD AUTO: 0.01 THOUSAND/UL (ref 0–0.2)
IMM GRANULOCYTES NFR BLD AUTO: 0 % (ref 0–2)
LYMPHOCYTES # BLD AUTO: 1.56 THOUSANDS/ΜL (ref 0.6–4.47)
LYMPHOCYTES NFR BLD AUTO: 27 % (ref 14–44)
MCH RBC QN AUTO: 26.8 PG (ref 26.8–34.3)
MCHC RBC AUTO-ENTMCNC: 30.6 G/DL (ref 31.4–37.4)
MCV RBC AUTO: 87 FL (ref 82–98)
MONOCYTES # BLD AUTO: 0.59 THOUSAND/ΜL (ref 0.17–1.22)
MONOCYTES NFR BLD AUTO: 10 % (ref 4–12)
NEUTROPHILS # BLD AUTO: 3.19 THOUSANDS/ΜL (ref 1.85–7.62)
NEUTS SEG NFR BLD AUTO: 56 % (ref 43–75)
NRBC BLD AUTO-RTO: 0 /100 WBCS
PLATELET # BLD AUTO: 268 THOUSANDS/UL (ref 149–390)
PMV BLD AUTO: 10.2 FL (ref 8.9–12.7)
POTASSIUM SERPL-SCNC: 3.8 MMOL/L (ref 3.5–5.3)
PROT SERPL-MCNC: 7.6 G/DL (ref 6.4–8.2)
RBC # BLD AUTO: 4.93 MILLION/UL (ref 3.81–5.12)
SODIUM SERPL-SCNC: 140 MMOL/L (ref 136–145)
WBC # BLD AUTO: 5.7 THOUSAND/UL (ref 4.31–10.16)

## 2020-12-09 PROCEDURE — 85025 COMPLETE CBC W/AUTO DIFF WBC: CPT

## 2020-12-09 PROCEDURE — 36415 COLL VENOUS BLD VENIPUNCTURE: CPT

## 2020-12-09 PROCEDURE — 83036 HEMOGLOBIN GLYCOSYLATED A1C: CPT

## 2020-12-09 PROCEDURE — 80053 COMPREHEN METABOLIC PANEL: CPT

## 2020-12-09 PROCEDURE — 71046 X-RAY EXAM CHEST 2 VIEWS: CPT

## 2020-12-11 DIAGNOSIS — R05.9 COUGH: Primary | ICD-10-CM

## 2020-12-11 RX ORDER — BENZONATATE 100 MG/1
100 CAPSULE ORAL 3 TIMES DAILY PRN
Qty: 20 CAPSULE | Refills: 0 | Status: SHIPPED | OUTPATIENT
Start: 2020-12-11 | End: 2021-08-14 | Stop reason: SDUPTHER

## 2020-12-14 DIAGNOSIS — R05.9 COUGHING: Primary | ICD-10-CM

## 2020-12-14 RX ORDER — PREDNISONE 10 MG/1
TABLET ORAL
Qty: 30 TABLET | Refills: 0 | Status: SHIPPED | OUTPATIENT
Start: 2020-12-14 | End: 2021-10-27 | Stop reason: ALTCHOICE

## 2021-02-19 ENCOUNTER — OFFICE VISIT (OUTPATIENT)
Dept: OBGYN CLINIC | Facility: MEDICAL CENTER | Age: 66
End: 2021-02-19
Payer: COMMERCIAL

## 2021-02-19 VITALS
HEIGHT: 61 IN | SYSTOLIC BLOOD PRESSURE: 114 MMHG | DIASTOLIC BLOOD PRESSURE: 70 MMHG | WEIGHT: 174 LBS | BODY MASS INDEX: 32.85 KG/M2

## 2021-02-19 DIAGNOSIS — Z01.419 ENCNTR FOR GYN EXAM (GENERAL) (ROUTINE) W/O ABN FINDINGS: Primary | ICD-10-CM

## 2021-02-19 DIAGNOSIS — N89.8 VAGINAL ODOR: ICD-10-CM

## 2021-02-19 DIAGNOSIS — Z12.31 ENCOUNTER FOR SCREENING MAMMOGRAM FOR MALIGNANT NEOPLASM OF BREAST: ICD-10-CM

## 2021-02-19 PROCEDURE — 81513 NFCT DS BV RNA VAG FLU ALG: CPT | Performed by: NURSE PRACTITIONER

## 2021-02-19 PROCEDURE — 99387 INIT PM E/M NEW PAT 65+ YRS: CPT | Performed by: NURSE PRACTITIONER

## 2021-02-19 PROCEDURE — 3008F BODY MASS INDEX DOCD: CPT | Performed by: NURSE PRACTITIONER

## 2021-02-19 PROCEDURE — 1036F TOBACCO NON-USER: CPT | Performed by: NURSE PRACTITIONER

## 2021-02-19 PROCEDURE — 1160F RVW MEDS BY RX/DR IN RCRD: CPT | Performed by: NURSE PRACTITIONER

## 2021-02-19 NOTE — PROGRESS NOTES
ASSESSMENT & PLAN: Randy Brown is a 76 y o  [id number] with normal gynecologic exam     1   Routine well woman exam done today  2  Pap and HPV:  The patient's last pap and hpv was years ago  It was normal     Pap with cotesting was not done today  Current ASCCP Guidelines reviewed  3   Mammogram ordered  4  Colorectal cancer screening wasordered  5   The following were reviewed in today's visit: breast self exam, mammography screening ordered, menopause, adequate intake of calcium and vitamin D, exercise and healthy diet  Urine dip was normal, on blood, vagina normal without d/c of blood  Pt wants culture done from vagina understands their is in significant amout of d/c and no odor, aware insurance may not cover cost will call her with results of testing  Will monitor bleeding and if returns, will rto for evaluation  CC:  Annual Gynecologic Examination    HPI: Randy Brown is a 76 y o  [id number] who presents for annual gynecologic examination  She has the following concerns: has urinary incontinence that she use a pad for  Has hx of total hsyterectomy for fibroids  Recently she noticed scant amount of blood,on underwear  not sure where it was coming from  No longer present  C/o vaginal odor    Health Maintenance:    She wears her seatbelt routinely  She does perform regular monthly self breast exams  She feels safe at home       Last PAP normal years ago   Last mammogram: oct 2020  Last colonoscopy:     Past Medical History:   Diagnosis Date    Tibia/fibula fracture 2016    ORIF       Past Surgical History:   Procedure Laterality Date     SECTION      x3:  10/30/1977(F), 1980 (F), 1981 (M)    HYSTERECTOMY      LEG SURGERY Right     fracture       Past OB/Gyn History:  OB History        3    Para   3    Term   3            AB        Living   3       SAB        TAB        Ectopic        Multiple        Live Births   3               History of abnormal pap smears: No        Family History   Problem Relation Age of Onset   [facility] Breast cancer Mother     Diabetes Mother     Breast cancer Maternal Grandmother     Diabetes Paternal Grandmother     Cancer Family        Social History:  Social History     Socioeconomic History    Marital status: /Civil Union     Spouse name: Not on file    Number of children: Not on file    Years of education: Not on file    Highest education level: Not on file   Occupational History    Not on file   Social Needs    Financial resource strain: Not on file    Food insecurity     Worry: Not on file     Inability: Not on file   [employer] needs     Medical: Not on file     Non-medical: Not on file   Tobacco Use    Smoking status: Never Smoker    Smokeless tobacco: Never Used   Substance and Sexual Activity    Alcohol use: No    Drug use: No    Sexual activity: Yes     Partners: Male   Lifestyle    Physical activity     Days per week: Not on file     Minutes per session: Not on file    Stress: Not on file   Relationships    Social connections     Talks on phone: Not on file     Gets together: Not on file     Attends Zoroastrian service: Not on file     Active member of club or organization: Not on file     Attends meetings of clubs or organizations: Not on file     Relationship status: Not on file    Intimate partner violence     Fear of current or ex partner: Not on file     Emotionally abused: Not on file     Physically abused: Not on file     Forced sexual activity: Not on file   Other Topics Concern    Not on file   Social History Narrative    Not on file     Presently lives with spouse  Patient is currently retired     Allergies   Allergen Reactions    Simvastatin Other (See Comments)     MUSCLE WEAKNESS         Current Outpatient Medications:     aspirin 81 MG tablet, Take 81 mg by mouth, Disp: , Rfl:     diclofenac sodium (VOLTAREN) 1 %, Apply 4 g topically 3 (three) times a day, Disp: 100 g, Rfl: 3    fexofenadine (ALLEGRA) 180 MG tablet, Take 180 mg by mouth daily, Disp: , Rfl:     Omega-3 Fatty Acids (OMEGA 3 PO), Take 1 capsule by mouth daily, Disp: , Rfl:     benzonatate (TESSALON PERLES) 100 mg capsule, Take 1 capsule (100 mg total) by mouth 3 (three) times a day as needed for cough, Disp: 20 capsule, Rfl: 0    predniSONE 10 mg tablet, Take 5 tabs PO daily x 2 days; take 4 tabs QD x 2 days; take 3 tabs QD x 2 days; take 2 tabs QD x 2 days; take 1 tab QD x 2 days, Disp: 30 tablet, Rfl: 0    promethazine-codeine (PHENERGAN WITH CODEINE) 6 25-10 mg/5 mL syrup, Take 5 mL by mouth every 4 (four) hours as needed for cough (Patient not taking: Reported on 2/19/2021), Disp: 120 mL, Rfl: 0    terbinafine (LamISIL) 250 mg tablet, Take 2 tablets by mouth daily, Disp: , Rfl:     Review of Systems  Constitutional :no fever, feels well, no tiredness, no recent weight gain or loss  ENT: no ear ache, no loss of hearing, no nosebleeds or nasal discharge, no sore throat or hoarseness  Cardiovascular: no complaints of slow or fast heart beat, no chest pain, no palpitations, no leg claudication or lower extremity edema  Respiratory: no complaints of shortness of shortness of breath, no VELASQUEZ  Breasts:no complaints of breast pain, breast lump, or nipple discharge  Gastrointestinal: no complaints of abdominal pain, constipation, nausea, vomiting, or diarrhea or bloody stools  Genitourinary : no complaints of dysuria, incontinence, pelvic pain, no dysmenorrhea, vaginal discharge or abnormal vaginal bleeding and as noted in HPI  Musculoskeletal: no complaints of arthralgia, no myalgia, no joint swelling or stiffness, no limb pain or swelling    Integumentary: no complaints of skin rash or lesion, itching or dry skin  Neurological: no complaints of headache, no confusion, no numbness or tingling, no dizziness or fainting    Objective      /70   Ht 5' 0 51" (1 537 m)   Wt 78 9 kg (174 lb)   LMP  (LMP Unknown) BMI 33 41 kg/m²   General:   appears stated age, cooperative, alert normal mood and affect   Neck: normal, supple,trachea midline, no masses   Heart: regular rate and rhythm, S1, S2 normal, no murmur, click, rub or gallop   Lungs: clear to auscultation bilaterally   Breasts: normal appearance, no masses or tenderness   Abdomen: soft, non-tender, without masses or organomegaly   Vulva: normal   Vagina: normal vagina   Urethra: normal   Cervix: Absent  Uterus: uterus absent   Adnexa: no mass, fullness, tenderness   Lymphatic palpation of lymph nodes in neck, axilla, groin and/or other locations: no lymphadenopathy or masses noted   Skin normal skin turgor and no rashes     Psychiatric orientation to person, place, and time: normal  mood and affect: normal

## 2021-02-20 LAB
CANDIDA RRNA VAG QL PROBE: NEGATIVE
G VAGINALIS RRNA GENITAL QL PROBE: NEGATIVE
T VAGINALIS RRNA GENITAL QL PROBE: NEGATIVE

## 2021-08-14 ENCOUNTER — OFFICE VISIT (OUTPATIENT)
Dept: FAMILY MEDICINE CLINIC | Facility: CLINIC | Age: 66
End: 2021-08-14
Payer: COMMERCIAL

## 2021-08-14 VITALS
WEIGHT: 173 LBS | HEIGHT: 61 IN | HEART RATE: 69 BPM | DIASTOLIC BLOOD PRESSURE: 78 MMHG | RESPIRATION RATE: 16 BRPM | BODY MASS INDEX: 32.66 KG/M2 | TEMPERATURE: 97.1 F | OXYGEN SATURATION: 97 % | SYSTOLIC BLOOD PRESSURE: 110 MMHG

## 2021-08-14 DIAGNOSIS — R05.9 COUGH: ICD-10-CM

## 2021-08-14 DIAGNOSIS — J01.90 ACUTE NON-RECURRENT SINUSITIS, UNSPECIFIED LOCATION: Primary | ICD-10-CM

## 2021-08-14 DIAGNOSIS — Z91.09 ENVIRONMENTAL ALLERGIES: ICD-10-CM

## 2021-08-14 DIAGNOSIS — Z23 NEED FOR SHINGLES VACCINE: ICD-10-CM

## 2021-08-14 PROCEDURE — 90750 HZV VACC RECOMBINANT IM: CPT | Performed by: NURSE PRACTITIONER

## 2021-08-14 PROCEDURE — 99213 OFFICE O/P EST LOW 20 MIN: CPT | Performed by: NURSE PRACTITIONER

## 2021-08-14 PROCEDURE — 90471 IMMUNIZATION ADMIN: CPT | Performed by: NURSE PRACTITIONER

## 2021-08-14 PROCEDURE — 3008F BODY MASS INDEX DOCD: CPT | Performed by: NURSE PRACTITIONER

## 2021-08-14 PROCEDURE — 87635 SARS-COV-2 COVID-19 AMP PRB: CPT | Performed by: NURSE PRACTITIONER

## 2021-08-14 PROCEDURE — 3725F SCREEN DEPRESSION PERFORMED: CPT | Performed by: NURSE PRACTITIONER

## 2021-08-14 PROCEDURE — 1160F RVW MEDS BY RX/DR IN RCRD: CPT | Performed by: NURSE PRACTITIONER

## 2021-08-14 PROCEDURE — 1036F TOBACCO NON-USER: CPT | Performed by: NURSE PRACTITIONER

## 2021-08-14 RX ORDER — AMOXICILLIN AND CLAVULANATE POTASSIUM 875; 125 MG/1; MG/1
1 TABLET, FILM COATED ORAL EVERY 12 HOURS SCHEDULED
Qty: 14 TABLET | Refills: 0 | Status: SHIPPED | OUTPATIENT
Start: 2021-08-14 | End: 2021-08-21

## 2021-08-14 RX ORDER — LORATADINE 10 MG/1
10 TABLET ORAL DAILY
Qty: 30 TABLET | Refills: 2 | Status: SHIPPED | OUTPATIENT
Start: 2021-08-14 | End: 2022-01-21 | Stop reason: SDUPTHER

## 2021-08-14 RX ORDER — BENZONATATE 100 MG/1
100 CAPSULE ORAL 3 TIMES DAILY PRN
Qty: 20 CAPSULE | Refills: 0 | Status: SHIPPED | OUTPATIENT
Start: 2021-08-14 | End: 2022-01-21

## 2021-08-14 NOTE — PROGRESS NOTES
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    - amoxicillin-clavulanate (Augmentin) 875-125 mg per tablet; Take 1 tablet by mouth every 12 (twelve) hours for 7 days  Dispense: 14 tablet; Refill: 0  - Novel Coronavirus (Covid-19),PCR [facility] - Collected in Office    2  Cough    - benzonatate (TESSALON PERLES) 100 mg capsule; Take 1 capsule (100 mg total) by mouth 3 (three) times a day as needed for cough  Dispense: 20 capsule; Refill: 0    3  Environmental allergies    Patient does not feel Matty Hannah is as effective as it used to be  DC Allegra and trial Claritin daily  Monitor allergy symptoms    - loratadine (CLARITIN) 10 mg tablet; Take 1 tablet (10 mg total) by mouth daily  Dispense: 30 tablet; Refill: 2    4  Need for shingles vaccine   will start Shingrix series today  Administered by MA without issue  Return 2-6 months for booster dose    - Zoster Vaccine Recombinant IM            SUBJECTIVE       Patient ID: Floridalma Treadwell is a 76 y o  female  Chief Complaint   Patient presents with    Sore Throat     x1wk Taking (Allegra D Little Relief)    Nasal Congestion     x1wk    Fever     At Night Low Grade 99       HISTORY OF PRESENT ILLNESS     Patient presents today with URI symptoms  Started roughly 1 week ago  Nasal congestion, sinus pain/pressure, sore throat, nonproductive cough, fever ( only at HS -in the 99)  Has been taking Allegra and Flonase with little relief  Patient does have history of allergies    COVID vaccine in April    The following portions of the patient's history were reviewed and updated as appropriate: allergies, current medications, past family history, past medical history, past social history, past surgical history and problem list     REVIEW OF SYSTEMS  Review of Systems   Constitutional: Positive for fatigue and fever  Negative for activity change and appetite change  HENT: Positive for postnasal drip, sinus pressure and sinus pain  Eyes: Positive for itching  Negative for pain and discharge  Respiratory: Negative for chest tightness and wheezing  Cardiovascular: Negative  Gastrointestinal: Negative  Psychiatric/Behavioral: Negative          OBJECTIVE      VITAL SIGNS  /78 (BP Location: Left arm, Patient Position: Sitting, Cuff Size: Large)   Pulse 69   Temp (!) 97 1 °F (36 2 °C) (Tympanic)   Resp 16   Ht 5' 0 63" (1 54 m)   Wt 78 5 kg (173 lb)   LMP  (LMP Unknown)   SpO2 97%   BMI 33 09 kg/m²     CURRENT MEDICATIONS    Current Outpatient Medications:     aspirin 81 MG tablet, Take 81 mg by mouth, Disp: , Rfl:     diclofenac sodium (VOLTAREN) 1 %, Apply 4 g topically 3 (three) times a day, Disp: 100 g, Rfl: 3    Omega-3 Fatty Acids (OMEGA 3 PO), Take 1 capsule by mouth daily, Disp: , Rfl:     amoxicillin-clavulanate (Augmentin) 875-125 mg per tablet, Take 1 tablet by mouth every 12 (twelve) hours for 7 days, Disp: 14 tablet, Rfl: 0    benzonatate (TESSALON PERLES) 100 mg capsule, Take 1 capsule (100 mg total) by mouth 3 (three) times a day as needed for cough, Disp: 20 capsule, Rfl: 0    loratadine (CLARITIN) 10 mg tablet, Take 1 tablet (10 mg total) by mouth daily, Disp: 30 tablet, Rfl: 2    predniSONE 10 mg tablet, Take 5 tabs PO daily x 2 days; take 4 tabs QD x 2 days; take 3 tabs QD x 2 days; take 2 tabs QD x 2 days; take 1 tab QD x 2 days (Patient not taking: Reported on 8/14/2021), Disp: 30 tablet, Rfl: 0    promethazine-codeine (PHENERGAN WITH CODEINE) 6 25-10 mg/5 mL syrup, Take 5 mL by mouth every 4 (four) hours as needed for cough (Patient not taking: Reported on 2/19/2021), Disp: 120 mL, Rfl: 0    terbinafine (LamISIL) 250 mg tablet, Take 2 tablets by mouth daily (Patient not taking: Reported on 8/14/2021), Disp: , Rfl:       PHYSICAL EXAMINATION   Physical Exam  Vitals and nursing note reviewed  Constitutional:       General: She is not in acute distress  Appearance: Normal appearance  She is well-developed and well-groomed  She is not ill-appearing  HENT:      Head: Normocephalic and atraumatic  Right Ear: Tympanic membrane, ear canal and external ear normal       Left Ear: Tympanic membrane, ear canal and external ear normal       Nose: Congestion present  Right Turbinates: Swollen  Left Turbinates: Swollen  Right Sinus: Maxillary sinus tenderness and frontal sinus tenderness present  Left Sinus: Maxillary sinus tenderness and frontal sinus tenderness present  Mouth/Throat:      Mouth: Mucous membranes are moist       Pharynx: Oropharynx is clear  Eyes:      Conjunctiva/sclera: Conjunctivae normal    Cardiovascular:      Rate and Rhythm: Normal rate and regular rhythm  Pulmonary:      Effort: Pulmonary effort is normal  No respiratory distress  Breath sounds: Normal breath sounds and air entry  Lymphadenopathy:      Cervical: No cervical adenopathy  Skin:     General: Skin is warm and dry  Neurological:      General: No focal deficit present  Mental Status: She is alert and oriented to person, place, and time     Psychiatric:         Attention and Perception: Attention normal          Mood and Affect: Mood and affect normal          Speech: Speech normal          Behavior: Behavior normal

## 2021-08-16 ENCOUNTER — TELEPHONE (OUTPATIENT)
Dept: ADMINISTRATIVE | Facility: OTHER | Age: 66
End: 2021-08-16

## 2021-08-16 LAB — SARS-COV-2 RNA RESP QL NAA+PROBE: NEGATIVE

## 2021-08-16 NOTE — TELEPHONE ENCOUNTER
Upon review of the In Basket request we were able to locate, review, and update the patient chart as requested for CRC: Colonoscopy  Any additional questions or concerns should be emailed to the Practice Liaisons via [email or web address] email, please do not reply via In Basket      Thank you  Kash Patterson MA

## 2021-10-14 ENCOUNTER — CLINICAL SUPPORT (OUTPATIENT)
Dept: FAMILY MEDICINE CLINIC | Facility: CLINIC | Age: 66
End: 2021-10-14
Payer: COMMERCIAL

## 2021-10-14 DIAGNOSIS — Z23 NEED FOR VACCINATION: Primary | ICD-10-CM

## 2021-10-14 PROCEDURE — 90750 HZV VACC RECOMBINANT IM: CPT | Performed by: FAMILY MEDICINE

## 2021-10-14 PROCEDURE — 90471 IMMUNIZATION ADMIN: CPT | Performed by: FAMILY MEDICINE

## 2021-10-27 ENCOUNTER — OFFICE VISIT (OUTPATIENT)
Dept: FAMILY MEDICINE CLINIC | Facility: CLINIC | Age: 66
End: 2021-10-27
Payer: COMMERCIAL

## 2021-10-27 VITALS
HEART RATE: 81 BPM | HEIGHT: 61 IN | WEIGHT: 178 LBS | TEMPERATURE: 96.8 F | OXYGEN SATURATION: 100 % | DIASTOLIC BLOOD PRESSURE: 70 MMHG | BODY MASS INDEX: 33.61 KG/M2 | SYSTOLIC BLOOD PRESSURE: 122 MMHG

## 2021-10-27 DIAGNOSIS — K64.4 EXTERNAL HEMORRHOID: Primary | ICD-10-CM

## 2021-10-27 PROCEDURE — 1160F RVW MEDS BY RX/DR IN RCRD: CPT | Performed by: FAMILY MEDICINE

## 2021-10-27 PROCEDURE — 1036F TOBACCO NON-USER: CPT | Performed by: FAMILY MEDICINE

## 2021-10-27 PROCEDURE — 3008F BODY MASS INDEX DOCD: CPT | Performed by: FAMILY MEDICINE

## 2021-10-27 PROCEDURE — 99213 OFFICE O/P EST LOW 20 MIN: CPT | Performed by: FAMILY MEDICINE

## 2021-10-27 RX ORDER — HYDROCORTISONE ACETATE 25 MG/1
25 SUPPOSITORY RECTAL 2 TIMES DAILY PRN
Qty: 12 SUPPOSITORY | Refills: 0 | Status: SHIPPED | OUTPATIENT
Start: 2021-10-27 | End: 2022-01-21

## 2021-11-17 ENCOUNTER — TELEMEDICINE (OUTPATIENT)
Dept: FAMILY MEDICINE CLINIC | Facility: CLINIC | Age: 66
End: 2021-11-17
Payer: COMMERCIAL

## 2021-11-17 DIAGNOSIS — J01.40 ACUTE NON-RECURRENT PANSINUSITIS: Primary | ICD-10-CM

## 2021-11-17 PROCEDURE — U0003 INFECTIOUS AGENT DETECTION BY NUCLEIC ACID (DNA OR RNA); SEVERE ACUTE RESPIRATORY SYNDROME CORONAVIRUS 2 (SARS-COV-2) (CORONAVIRUS DISEASE [COVID-19]), AMPLIFIED PROBE TECHNIQUE, MAKING USE OF HIGH THROUGHPUT TECHNOLOGIES AS DESCRIBED BY CMS-2020-01-R: HCPCS | Performed by: FAMILY MEDICINE

## 2021-11-17 PROCEDURE — 99441 PR PHYS/QHP TELEPHONE EVALUATION 5-10 MIN: CPT | Performed by: FAMILY MEDICINE

## 2021-11-17 PROCEDURE — U0005 INFEC AGEN DETEC AMPLI PROBE: HCPCS | Performed by: FAMILY MEDICINE

## 2021-11-17 RX ORDER — AMOXICILLIN AND CLAVULANATE POTASSIUM 875; 125 MG/1; MG/1
1 TABLET, FILM COATED ORAL EVERY 12 HOURS SCHEDULED
Qty: 14 TABLET | Refills: 0 | Status: SHIPPED | OUTPATIENT
Start: 2021-11-17 | End: 2021-11-24

## 2021-12-13 ENCOUNTER — OFFICE VISIT (OUTPATIENT)
Dept: FAMILY MEDICINE CLINIC | Facility: CLINIC | Age: 66
End: 2021-12-13
Payer: COMMERCIAL

## 2021-12-13 VITALS
HEIGHT: 61 IN | RESPIRATION RATE: 18 BRPM | SYSTOLIC BLOOD PRESSURE: 128 MMHG | WEIGHT: 176.2 LBS | DIASTOLIC BLOOD PRESSURE: 70 MMHG | HEART RATE: 70 BPM | OXYGEN SATURATION: 99 % | BODY MASS INDEX: 33.27 KG/M2 | TEMPERATURE: 97.2 F

## 2021-12-13 DIAGNOSIS — R31.9 HEMATURIA, UNSPECIFIED TYPE: ICD-10-CM

## 2021-12-13 DIAGNOSIS — K59.09 CHRONIC CONSTIPATION: ICD-10-CM

## 2021-12-13 DIAGNOSIS — J98.8 RESPIRATORY INFECTION: ICD-10-CM

## 2021-12-13 DIAGNOSIS — R30.0 DYSURIA: Primary | ICD-10-CM

## 2021-12-13 LAB
SL AMB  POCT GLUCOSE, UA: ABNORMAL
SL AMB LEUKOCYTE ESTERASE,UA: 2
SL AMB POCT BILIRUBIN,UA: ABNORMAL
SL AMB POCT BLOOD,UA: ABNORMAL
SL AMB POCT CLARITY,UA: CLEAR
SL AMB POCT COLOR,UA: YELLOW
SL AMB POCT KETONES,UA: ABNORMAL
SL AMB POCT NITRITE,UA: ABNORMAL
SL AMB POCT PH,UA: 7
SL AMB POCT SPECIFIC GRAVITY,UA: 1.02
SL AMB POCT URINE PROTEIN: ABNORMAL
SL AMB POCT UROBILINOGEN: 0.2

## 2021-12-13 PROCEDURE — 1160F RVW MEDS BY RX/DR IN RCRD: CPT | Performed by: FAMILY MEDICINE

## 2021-12-13 PROCEDURE — 99213 OFFICE O/P EST LOW 20 MIN: CPT | Performed by: FAMILY MEDICINE

## 2021-12-13 PROCEDURE — 1036F TOBACCO NON-USER: CPT | Performed by: FAMILY MEDICINE

## 2021-12-13 PROCEDURE — 3008F BODY MASS INDEX DOCD: CPT | Performed by: FAMILY MEDICINE

## 2021-12-13 PROCEDURE — 87186 SC STD MICRODIL/AGAR DIL: CPT | Performed by: FAMILY MEDICINE

## 2021-12-13 PROCEDURE — 87077 CULTURE AEROBIC IDENTIFY: CPT | Performed by: FAMILY MEDICINE

## 2021-12-13 PROCEDURE — 81003 URINALYSIS AUTO W/O SCOPE: CPT | Performed by: FAMILY MEDICINE

## 2021-12-13 PROCEDURE — 87086 URINE CULTURE/COLONY COUNT: CPT | Performed by: FAMILY MEDICINE

## 2021-12-13 RX ORDER — DOXYCYCLINE HYCLATE 100 MG/1
100 CAPSULE ORAL EVERY 12 HOURS SCHEDULED
Qty: 20 CAPSULE | Refills: 0 | Status: SHIPPED | OUTPATIENT
Start: 2021-12-13 | End: 2021-12-16 | Stop reason: ALTCHOICE

## 2021-12-16 ENCOUNTER — TELEPHONE (OUTPATIENT)
Dept: FAMILY MEDICINE CLINIC | Facility: CLINIC | Age: 66
End: 2021-12-16

## 2021-12-16 LAB
BACTERIA UR CULT: ABNORMAL
BACTERIA UR CULT: ABNORMAL

## 2022-01-20 PROCEDURE — U0005 INFEC AGEN DETEC AMPLI PROBE: HCPCS | Performed by: FAMILY MEDICINE

## 2022-01-20 PROCEDURE — U0003 INFECTIOUS AGENT DETECTION BY NUCLEIC ACID (DNA OR RNA); SEVERE ACUTE RESPIRATORY SYNDROME CORONAVIRUS 2 (SARS-COV-2) (CORONAVIRUS DISEASE [COVID-19]), AMPLIFIED PROBE TECHNIQUE, MAKING USE OF HIGH THROUGHPUT TECHNOLOGIES AS DESCRIBED BY CMS-2020-01-R: HCPCS | Performed by: FAMILY MEDICINE

## 2022-01-21 ENCOUNTER — OFFICE VISIT (OUTPATIENT)
Dept: FAMILY MEDICINE CLINIC | Facility: CLINIC | Age: 67
End: 2022-01-21
Payer: COMMERCIAL

## 2022-01-21 VITALS
RESPIRATION RATE: 16 BRPM | TEMPERATURE: 97.8 F | DIASTOLIC BLOOD PRESSURE: 70 MMHG | SYSTOLIC BLOOD PRESSURE: 138 MMHG | OXYGEN SATURATION: 98 % | HEART RATE: 76 BPM | HEIGHT: 60 IN | BODY MASS INDEX: 35.3 KG/M2 | WEIGHT: 179.8 LBS

## 2022-01-21 DIAGNOSIS — Z91.09 ENVIRONMENTAL ALLERGIES: ICD-10-CM

## 2022-01-21 DIAGNOSIS — M54.50 ACUTE BILATERAL LOW BACK PAIN WITHOUT SCIATICA: ICD-10-CM

## 2022-01-21 DIAGNOSIS — J01.40 ACUTE NON-RECURRENT PANSINUSITIS: Primary | ICD-10-CM

## 2022-01-21 DIAGNOSIS — Z13.820 SCREENING FOR OSTEOPOROSIS: ICD-10-CM

## 2022-01-21 DIAGNOSIS — H57.89 IRRITATION OF RIGHT EYE: ICD-10-CM

## 2022-01-21 LAB
SL AMB  POCT GLUCOSE, UA: NEGATIVE
SL AMB LEUKOCYTE ESTERASE,UA: NEGATIVE
SL AMB POCT BILIRUBIN,UA: NEGATIVE
SL AMB POCT BLOOD,UA: NORMAL
SL AMB POCT CLARITY,UA: CLEAR
SL AMB POCT COLOR,UA: YELLOW
SL AMB POCT KETONES,UA: NEGATIVE
SL AMB POCT NITRITE,UA: NEGATIVE
SL AMB POCT PH,UA: NEGATIVE
SL AMB POCT SPECIFIC GRAVITY,UA: 1.02
SL AMB POCT URINE PROTEIN: 7
SL AMB POCT UROBILINOGEN: 0.2

## 2022-01-21 PROCEDURE — 3288F FALL RISK ASSESSMENT DOCD: CPT | Performed by: FAMILY MEDICINE

## 2022-01-21 PROCEDURE — 1101F PT FALLS ASSESS-DOCD LE1/YR: CPT | Performed by: FAMILY MEDICINE

## 2022-01-21 PROCEDURE — 1160F RVW MEDS BY RX/DR IN RCRD: CPT | Performed by: FAMILY MEDICINE

## 2022-01-21 PROCEDURE — 1036F TOBACCO NON-USER: CPT | Performed by: FAMILY MEDICINE

## 2022-01-21 PROCEDURE — 81002 URINALYSIS NONAUTO W/O SCOPE: CPT | Performed by: FAMILY MEDICINE

## 2022-01-21 PROCEDURE — 3008F BODY MASS INDEX DOCD: CPT | Performed by: FAMILY MEDICINE

## 2022-01-21 PROCEDURE — 99214 OFFICE O/P EST MOD 30 MIN: CPT | Performed by: FAMILY MEDICINE

## 2022-01-21 PROCEDURE — 3725F SCREEN DEPRESSION PERFORMED: CPT | Performed by: FAMILY MEDICINE

## 2022-01-21 RX ORDER — METHOCARBAMOL 500 MG/1
500 TABLET, FILM COATED ORAL 4 TIMES DAILY PRN
Qty: 30 TABLET | Refills: 0 | Status: SHIPPED | OUTPATIENT
Start: 2022-01-21 | End: 2022-03-22 | Stop reason: SDUPTHER

## 2022-01-21 RX ORDER — LORATADINE 10 MG/1
10 TABLET ORAL DAILY
Qty: 30 TABLET | Refills: 2 | Status: SHIPPED | OUTPATIENT
Start: 2022-01-21 | End: 2022-03-22

## 2022-01-21 RX ORDER — AMOXICILLIN AND CLAVULANATE POTASSIUM 875; 125 MG/1; MG/1
1 TABLET, FILM COATED ORAL EVERY 12 HOURS SCHEDULED
Qty: 14 TABLET | Refills: 0 | Status: SHIPPED | OUTPATIENT
Start: 2022-01-21 | End: 2022-01-28

## 2022-01-21 NOTE — ASSESSMENT & PLAN NOTE
COVID19 test negative; ongoing x 2 weeks; advised OTC symptom relief and abx as ordered; f/u guidance given

## 2022-01-21 NOTE — ASSESSMENT & PLAN NOTE
Exam WNL; u/a unremarkable; will check x-ray and if no improvement with pain relief and muscle relaxer may need further imaging; f/u guidance given

## 2022-01-21 NOTE — PROGRESS NOTES
Assessment/Plan:     1  Acute non-recurrent pansinusitis  Assessment & Plan:  COVID19 test negative; ongoing x 2 weeks; advised OTC symptom relief and abx as ordered; f/u guidance given    Orders:  -     amoxicillin-clavulanate (AUGMENTIN) 875-125 mg per tablet; Take 1 tablet by mouth every 12 (twelve) hours for 7 days    2  Environmental allergies  -     loratadine (CLARITIN) 10 mg tablet; Take 1 tablet (10 mg total) by mouth daily    3  Screening for osteoporosis  -     DXA bone density spine hip and pelvis; Future; Expected date: 01/21/2022    4  Acute bilateral low back pain without sciatica  Assessment & Plan:  Exam WNL; u/a unremarkable; will check x-ray and if no improvement with pain relief and muscle relaxer may need further imaging; f/u guidance given    Orders:  -     POCT urine dip  -     XR spine lumbar minimum 4 views non injury; Future; Expected date: 01/21/2022  -     methocarbamol (ROBAXIN) 500 mg tablet; Take 1 tablet (500 mg total) by mouth 4 (four) times a day as needed for muscle spasms    5  Irritation of right eye  Assessment & Plan:  Ongoing x 1 month; advised f/u with eye doctor          Subjective:      Patient ID: Mariano Carolina is a 78 y o  female  Upper Respiratory Infection  Patient complains of symptoms of a URI, possible sinusitis  Symptoms include congestion, nasal congestion, no  fever and sinus pressure  Onset of symptoms was 2 weeks ago, and has been gradually worsening since that time  Treatment to date: antihistamines and decongestants  Pt is complaining of pain in R eye feeling irritated for about 1 month  Feels dry and tearing  No known injury  No severe pain or vision changes  Feels dry  Patient is complaining of back pain  That started about 1 month ago  Started after urinary tract infection  Low back pain B/L  No radiation down legs  No worse with movement  Throbs at times  No numbness/tingling  No change in bowel habits or urinary symptoms  No known injury  The following portions of the patient's history were reviewed and updated as appropriate: allergies, current medications, past family history, past medical history, past social history, past surgical history, and problem list     Review of Systems   Constitutional: Negative for chills and fever  HENT: Positive for congestion and sinus pressure  Negative for sinus pain  Eyes: Negative for visual disturbance  Respiratory: Positive for cough  Negative for shortness of breath  Cardiovascular: Negative for chest pain  Gastrointestinal: Negative for abdominal pain  Genitourinary: Negative  Musculoskeletal: Positive for back pain  Neurological: Negative for weakness and numbness  Objective:      /70 (BP Location: Left arm, Patient Position: Sitting, Cuff Size: Large)   Pulse 76   Temp 97 8 °F (36 6 °C) (Temporal)   Resp 16   Ht 5' (1 524 m)   Wt 81 6 kg (179 lb 12 8 oz)   LMP  (LMP Unknown)   SpO2 98%   BMI 35 11 kg/m²          Physical Exam  Vitals reviewed  Constitutional:       General: She is not in acute distress  Appearance: Normal appearance  She is not ill-appearing, toxic-appearing or diaphoretic  HENT:      Head: Normocephalic and atraumatic  Right Ear: Tympanic membrane, ear canal and external ear normal       Left Ear: Tympanic membrane, ear canal and external ear normal       Nose: Congestion present  Mouth/Throat:      Pharynx: No oropharyngeal exudate or posterior oropharyngeal erythema  Eyes:      General: No scleral icterus  Right eye: No discharge  Left eye: No discharge  Extraocular Movements: Extraocular movements intact  Conjunctiva/sclera: Conjunctivae normal       Right eye: Right conjunctiva is not injected  No chemosis, exudate or hemorrhage  Cardiovascular:      Rate and Rhythm: Normal rate and regular rhythm  Pulses: Normal pulses  Heart sounds: Normal heart sounds   No murmur heard   No gallop  Pulmonary:      Effort: Pulmonary effort is normal  No respiratory distress  Breath sounds: Normal breath sounds  No stridor  No wheezing, rhonchi or rales  Abdominal:      Tenderness: There is no right CVA tenderness or left CVA tenderness  Musculoskeletal:      Cervical back: Neck supple  No tenderness  Thoracic back: No tenderness or bony tenderness  Normal range of motion  Lumbar back: No tenderness or bony tenderness  Normal range of motion  Negative right straight leg raise test and negative left straight leg raise test       Right lower leg: No edema  Left lower leg: No edema  Lymphadenopathy:      Cervical: No cervical adenopathy  Neurological:      General: No focal deficit present  Mental Status: She is alert and oriented to person, place, and time  Psychiatric:         Mood and Affect: Mood normal          Behavior: Behavior normal          Thought Content:  Thought content normal          Judgment: Judgment normal

## 2022-03-22 ENCOUNTER — TELEPHONE (OUTPATIENT)
Dept: ADMINISTRATIVE | Facility: OTHER | Age: 67
End: 2022-03-22

## 2022-03-22 ENCOUNTER — APPOINTMENT (OUTPATIENT)
Dept: RADIOLOGY | Facility: CLINIC | Age: 67
End: 2022-03-22
Payer: COMMERCIAL

## 2022-03-22 ENCOUNTER — OFFICE VISIT (OUTPATIENT)
Dept: FAMILY MEDICINE CLINIC | Facility: CLINIC | Age: 67
End: 2022-03-22
Payer: COMMERCIAL

## 2022-03-22 VITALS
DIASTOLIC BLOOD PRESSURE: 72 MMHG | TEMPERATURE: 98.4 F | WEIGHT: 177.6 LBS | HEART RATE: 85 BPM | BODY MASS INDEX: 34.87 KG/M2 | HEIGHT: 60 IN | SYSTOLIC BLOOD PRESSURE: 100 MMHG | OXYGEN SATURATION: 98 %

## 2022-03-22 DIAGNOSIS — M54.50 ACUTE BILATERAL LOW BACK PAIN WITHOUT SCIATICA: ICD-10-CM

## 2022-03-22 DIAGNOSIS — Z11.52 ENCOUNTER FOR SCREENING FOR COVID-19: ICD-10-CM

## 2022-03-22 DIAGNOSIS — R10.9 LEFT FLANK PAIN: Primary | ICD-10-CM

## 2022-03-22 DIAGNOSIS — J22 ACUTE LOWER RESPIRATORY INFECTION: ICD-10-CM

## 2022-03-22 PROCEDURE — 1160F RVW MEDS BY RX/DR IN RCRD: CPT | Performed by: FAMILY MEDICINE

## 2022-03-22 PROCEDURE — 1036F TOBACCO NON-USER: CPT | Performed by: FAMILY MEDICINE

## 2022-03-22 PROCEDURE — 3008F BODY MASS INDEX DOCD: CPT | Performed by: FAMILY MEDICINE

## 2022-03-22 PROCEDURE — 99214 OFFICE O/P EST MOD 30 MIN: CPT | Performed by: FAMILY MEDICINE

## 2022-03-22 PROCEDURE — 87636 SARSCOV2 & INF A&B AMP PRB: CPT | Performed by: FAMILY MEDICINE

## 2022-03-22 PROCEDURE — 72110 X-RAY EXAM L-2 SPINE 4/>VWS: CPT

## 2022-03-22 RX ORDER — METHOCARBAMOL 500 MG/1
500 TABLET, FILM COATED ORAL 4 TIMES DAILY PRN
Qty: 30 TABLET | Refills: 0 | Status: SHIPPED | OUTPATIENT
Start: 2022-03-22 | End: 2022-07-16

## 2022-03-22 RX ORDER — ALBUTEROL SULFATE 90 UG/1
2 AEROSOL, METERED RESPIRATORY (INHALATION) EVERY 6 HOURS PRN
Qty: 18 G | Refills: 0 | Status: SHIPPED | OUTPATIENT
Start: 2022-03-22 | End: 2022-04-20 | Stop reason: SDUPTHER

## 2022-03-22 RX ORDER — DOXYCYCLINE HYCLATE 100 MG/1
100 CAPSULE ORAL EVERY 12 HOURS SCHEDULED
Qty: 20 CAPSULE | Refills: 0 | Status: SHIPPED | OUTPATIENT
Start: 2022-03-22 | End: 2022-04-01

## 2022-03-22 RX ORDER — METHYLPREDNISOLONE 4 MG/1
TABLET ORAL
Qty: 21 EACH | Refills: 0 | Status: SHIPPED | OUTPATIENT
Start: 2022-03-22 | End: 2022-04-20 | Stop reason: ALTCHOICE

## 2022-03-22 RX ORDER — BENZONATATE 100 MG/1
100 CAPSULE ORAL 3 TIMES DAILY PRN
Qty: 20 CAPSULE | Refills: 0 | Status: SHIPPED | OUTPATIENT
Start: 2022-03-22 | End: 2022-04-20 | Stop reason: SDUPTHER

## 2022-03-22 NOTE — TELEPHONE ENCOUNTER
Upon review of the In Basket request we were able to locate, review, and update the patient chart as requested for Mammogram     Any additional questions or concerns should be emailed to the Practice Liaisons via [email or web address] email, please do not reply via In Basket      Thank you  Niraj Dill

## 2022-03-22 NOTE — TELEPHONE ENCOUNTER
----- Message from Camilo Kim MA sent at 3/21/2022  2:18 PM EDT -----  Regarding: Care Gap Request  03/21/22 2:18 PM    Hello, our patient Danielle Solorzano has had Mammogram completed/performed  Please assist in updating the patient chart by pulling the Care Everywhere (CE) document  The date of service is 10/1/20       Thank you,  Camilo Kim MA  [facility]

## 2022-03-22 NOTE — ASSESSMENT & PLAN NOTE
Will f/u on x-rays as symptoms are persistent; will check u/s given pt feeling sx are internal and f/u with results; advised to restart muscle relaxant and steroids; f/u on imaging; f/u guidance given

## 2022-03-22 NOTE — ASSESSMENT & PLAN NOTE
Concern for lower respiratory infection; will check COVID19; advised abx and steroids given exam and ongoing sx; f/u if symptoms do not resolve with tx or any worsening sx; f/u guidance given

## 2022-03-22 NOTE — PATIENT INSTRUCTIONS
Complete steroids and antibiotics  Call if worsening symptoms or no improvement  Complete ultrasound  We will notify you of test results

## 2022-03-24 LAB
FLUAV RNA RESP QL NAA+PROBE: NEGATIVE
FLUBV RNA RESP QL NAA+PROBE: NEGATIVE
SARS-COV-2 RNA RESP QL NAA+PROBE: NEGATIVE

## 2022-03-25 ENCOUNTER — TELEPHONE (OUTPATIENT)
Dept: FAMILY MEDICINE CLINIC | Facility: CLINIC | Age: 67
End: 2022-03-25

## 2022-03-25 DIAGNOSIS — J22 ACUTE LOWER RESPIRATORY INFECTION: Primary | ICD-10-CM

## 2022-03-25 NOTE — TELEPHONE ENCOUNTER
Pt came in office to check on covid test/XR and to pay a bill and stated that KT said that if she wasn't getting better that KT would order a chest XR  Pt is asking for that order to be placed       Please advise [phone]

## 2022-04-20 ENCOUNTER — HOSPITAL ENCOUNTER (OUTPATIENT)
Dept: ULTRASOUND IMAGING | Facility: MEDICAL CENTER | Age: 67
Discharge: HOME | End: 2022-04-20
Payer: COMMERCIAL

## 2022-04-20 ENCOUNTER — APPOINTMENT (OUTPATIENT)
Dept: RADIOLOGY | Facility: CLINIC | Age: 67
End: 2022-04-20
Payer: COMMERCIAL

## 2022-04-20 ENCOUNTER — OFFICE VISIT (OUTPATIENT)
Dept: FAMILY MEDICINE CLINIC | Facility: CLINIC | Age: 67
End: 2022-04-20
Payer: COMMERCIAL

## 2022-04-20 VITALS
SYSTOLIC BLOOD PRESSURE: 118 MMHG | RESPIRATION RATE: 18 BRPM | WEIGHT: 181 LBS | HEIGHT: 60 IN | DIASTOLIC BLOOD PRESSURE: 80 MMHG | BODY MASS INDEX: 35.53 KG/M2 | TEMPERATURE: 100.2 F | OXYGEN SATURATION: 97 % | HEART RATE: 102 BPM

## 2022-04-20 DIAGNOSIS — J22 ACUTE LOWER RESPIRATORY INFECTION: ICD-10-CM

## 2022-04-20 DIAGNOSIS — J20.9 ACUTE BRONCHITIS, UNSPECIFIED ORGANISM: ICD-10-CM

## 2022-04-20 DIAGNOSIS — R10.9 LEFT FLANK PAIN: ICD-10-CM

## 2022-04-20 DIAGNOSIS — R30.0 DYSURIA: Primary | ICD-10-CM

## 2022-04-20 LAB
SL AMB  POCT GLUCOSE, UA: NORMAL
SL AMB LEUKOCYTE ESTERASE,UA: NORMAL
SL AMB POCT BILIRUBIN,UA: NORMAL
SL AMB POCT BLOOD,UA: NORMAL
SL AMB POCT CLARITY,UA: CLEAR
SL AMB POCT COLOR,UA: YELLOW
SL AMB POCT KETONES,UA: NORMAL
SL AMB POCT NITRITE,UA: NORMAL
SL AMB POCT PH,UA: 7
SL AMB POCT SPECIFIC GRAVITY,UA: 1.01
SL AMB POCT URINE PROTEIN: NORMAL
SL AMB POCT UROBILINOGEN: 0.2

## 2022-04-20 PROCEDURE — 76770 US EXAM ABDO BACK WALL COMP: CPT

## 2022-04-20 PROCEDURE — 99214 OFFICE O/P EST MOD 30 MIN: CPT | Performed by: FAMILY MEDICINE

## 2022-04-20 PROCEDURE — 1036F TOBACCO NON-USER: CPT | Performed by: FAMILY MEDICINE

## 2022-04-20 PROCEDURE — 1160F RVW MEDS BY RX/DR IN RCRD: CPT | Performed by: FAMILY MEDICINE

## 2022-04-20 PROCEDURE — 3008F BODY MASS INDEX DOCD: CPT | Performed by: FAMILY MEDICINE

## 2022-04-20 PROCEDURE — 71046 X-RAY EXAM CHEST 2 VIEWS: CPT

## 2022-04-20 PROCEDURE — 81003 URINALYSIS AUTO W/O SCOPE: CPT | Performed by: FAMILY MEDICINE

## 2022-04-20 RX ORDER — LEVOFLOXACIN 500 MG/1
500 TABLET, FILM COATED ORAL EVERY 24 HOURS
Qty: 7 TABLET | Refills: 0 | Status: SHIPPED | OUTPATIENT
Start: 2022-04-20 | End: 2022-04-27

## 2022-04-20 RX ORDER — ALBUTEROL SULFATE 90 UG/1
2 AEROSOL, METERED RESPIRATORY (INHALATION) EVERY 6 HOURS PRN
Qty: 18 G | Refills: 0 | Status: SHIPPED | OUTPATIENT
Start: 2022-04-20

## 2022-04-20 RX ORDER — BENZONATATE 100 MG/1
100 CAPSULE ORAL 3 TIMES DAILY PRN
Qty: 20 CAPSULE | Refills: 0 | Status: SHIPPED | OUTPATIENT
Start: 2022-04-20

## 2022-04-20 NOTE — PROGRESS NOTES
Assessment/Plan:   1  Acute bronchitis, unspecified organism    Reviewed patient's symptoms today  At this time, symptoms appear likely secondary to acute bronchitis  Start supportive care  Maintain hydration  Take over-the-counter Mucinex  Will start treatment Tessalon Perles  Given the long duration of her symptoms, will start treatment as well with Levaquin 5 mg daily for 7 days  She may also use Ventolin p r n  for symptom relief  Follow up if any symptoms are persisting   - levofloxacin (LEVAQUIN) 500 mg tablet; Take 1 tablet (500 mg total) by mouth every 24 hours for 7 days  Dispense: 7 tablet; Refill: 0  - benzonatate (TESSALON PERLES) 100 mg capsule; Take 1 capsule (100 mg total) by mouth 3 (three) times a day as needed for cough  Dispense: 20 capsule; Refill: 0  - albuterol (Ventolin HFA) 90 mcg/act inhaler; Inhale 2 puffs every 6 (six) hours as needed for wheezing  Dispense: 18 g; Refill: 0    2  Dysuria    Urinalysis was negative  At this time, she did have a ultrasound of her bladder kidneys  She was advised on importance of maintaining proper hydration   - POCT urine dip auto non-scope           Diagnoses and all orders for this visit:    Dysuria  -     POCT urine dip auto non-scope          Subjective:       Chief Complaint   Patient presents with    Urinary Tract Infection     x2 weeks dysuria, incontinence       Patient ID: Nancy Casey is a 78 y o  female  Cough  This is a new problem  Episode onset: 2 wks ago  The problem has been unchanged  Associated symptoms include chest pain, chills, ear congestion, ear pain, a fever, nasal congestion, postnasal drip, shortness of breath and wheezing  Pertinent negatives include no eye redness, headaches, myalgias or sore throat  Nothing aggravates the symptoms  There is no history of environmental allergies  Review of Systems   Constitutional: Positive for chills and fever  Negative for activity change and fatigue     HENT: Positive for ear pain and postnasal drip  Negative for congestion, sinus pressure and sore throat  Eyes: Negative for redness, itching and visual disturbance  Respiratory: Positive for cough, shortness of breath and wheezing  Cardiovascular: Positive for chest pain  Negative for palpitations  Gastrointestinal: Negative for abdominal pain, diarrhea and nausea  Endocrine: Negative for cold intolerance and heat intolerance  Genitourinary: Negative for dysuria, flank pain and frequency  Musculoskeletal: Negative for arthralgias, back pain, gait problem and myalgias  Skin: Negative for color change  Allergic/Immunologic: Negative for environmental allergies  Neurological: Negative for dizziness, numbness and headaches  Psychiatric/Behavioral: Negative for behavioral problems and sleep disturbance  The following portions of the patient's history were reviewed and updated as appropriate : past family history, past medical history, past social history and past surgical history      Current Outpatient Medications:     albuterol (Ventolin HFA) 90 mcg/act inhaler, Inhale 2 puffs every 6 (six) hours as needed for wheezing, Disp: 18 g, Rfl: 0    benzonatate (TESSALON PERLES) 100 mg capsule, Take 1 capsule (100 mg total) by mouth 3 (three) times a day as needed for cough, Disp: 20 capsule, Rfl: 0    methocarbamol (ROBAXIN) 500 mg tablet, Take 1 tablet (500 mg total) by mouth 4 (four) times a day as needed for muscle spasms, Disp: 30 tablet, Rfl: 0    methylPREDNISolone 4 MG tablet therapy pack, Use as directed on package (Patient not taking: Reported on 4/20/2022 ), Disp: 21 each, Rfl: 0    Omega-3 Fatty Acids (OMEGA 3 PO), Take 1 capsule by mouth daily, Disp: , Rfl:          Objective:         Vitals:    04/20/22 1042   BP: 118/80   BP Location: Left arm   Patient Position: Sitting   Cuff Size: Large   Pulse: 102   Resp: 18   Temp: 100 2 °F (37 9 °C)   TempSrc: Tympanic   SpO2: 97%   Weight: 82 1 kg (181 lb)   Height: 5' (1 524 m)     Physical Exam  Vitals reviewed  Constitutional:       Appearance: She is well-developed  HENT:      Head: Normocephalic and atraumatic  Nose: Nose normal       Mouth/Throat:      Pharynx: No oropharyngeal exudate  Eyes:      General: No scleral icterus  Right eye: No discharge  Left eye: No discharge  Pupils: Pupils are equal, round, and reactive to light  Neck:      Trachea: No tracheal deviation  Cardiovascular:      Rate and Rhythm: Normal rate and regular rhythm  Pulses:           Dorsalis pedis pulses are 2+ on the right side and 2+ on the left side  Posterior tibial pulses are 2+ on the right side and 2+ on the left side  Heart sounds: Normal heart sounds  No murmur heard  No friction rub  No gallop  Pulmonary:      Effort: Pulmonary effort is normal  No respiratory distress  Breath sounds: Normal breath sounds  No wheezing or rales  Abdominal:      General: Bowel sounds are normal  There is no distension  Palpations: Abdomen is soft  Tenderness: There is no abdominal tenderness  There is no guarding or rebound  Musculoskeletal:         General: Normal range of motion  Cervical back: Normal range of motion and neck supple  Lymphadenopathy:      Head:      Right side of head: No submental or submandibular adenopathy  Left side of head: No submental or submandibular adenopathy  Cervical: No cervical adenopathy  Right cervical: No superficial, deep or posterior cervical adenopathy  Left cervical: No superficial, deep or posterior cervical adenopathy  Skin:     General: Skin is warm and dry  Findings: No erythema  Neurological:      Mental Status: She is alert and oriented to person, place, and time  Cranial Nerves: No cranial nerve deficit  Sensory: No sensory deficit  Psychiatric:         Mood and Affect: Mood is not anxious or depressed           Speech: Speech normal          Behavior: Behavior normal          Thought Content:  Thought content normal          Judgment: Judgment normal

## 2022-05-23 ENCOUNTER — CLINICAL SUPPORT (OUTPATIENT)
Dept: FAMILY MEDICINE CLINIC | Facility: CLINIC | Age: 67
End: 2022-05-23

## 2022-05-23 DIAGNOSIS — U07.1 COVID-19: Primary | ICD-10-CM

## 2022-05-23 PROCEDURE — U0003 INFECTIOUS AGENT DETECTION BY NUCLEIC ACID (DNA OR RNA); SEVERE ACUTE RESPIRATORY SYNDROME CORONAVIRUS 2 (SARS-COV-2) (CORONAVIRUS DISEASE [COVID-19]), AMPLIFIED PROBE TECHNIQUE, MAKING USE OF HIGH THROUGHPUT TECHNOLOGIES AS DESCRIBED BY CMS-2020-01-R: HCPCS | Performed by: FAMILY MEDICINE

## 2022-05-23 PROCEDURE — U0005 INFEC AGEN DETEC AMPLI PROBE: HCPCS | Performed by: FAMILY MEDICINE

## 2022-05-24 LAB — SARS-COV-2 RNA RESP QL NAA+PROBE: NEGATIVE

## 2022-07-12 ENCOUNTER — OFFICE VISIT (OUTPATIENT)
Dept: UROLOGY | Facility: CLINIC | Age: 67
End: 2022-07-12
Payer: COMMERCIAL

## 2022-07-12 VITALS
HEIGHT: 60 IN | BODY MASS INDEX: 35.3 KG/M2 | HEART RATE: 68 BPM | DIASTOLIC BLOOD PRESSURE: 70 MMHG | WEIGHT: 179.8 LBS | SYSTOLIC BLOOD PRESSURE: 120 MMHG

## 2022-07-12 DIAGNOSIS — N20.0 CALCULUS OF KIDNEY: Primary | ICD-10-CM

## 2022-07-12 LAB
BACTERIA UR QL AUTO: ABNORMAL /HPF
BILIRUB UR QL STRIP: NEGATIVE
CLARITY UR: ABNORMAL
COLOR UR: ABNORMAL
GLUCOSE UR STRIP-MCNC: NEGATIVE MG/DL
HGB UR QL STRIP.AUTO: NEGATIVE
KETONES UR STRIP-MCNC: NEGATIVE MG/DL
LEUKOCYTE ESTERASE UR QL STRIP: ABNORMAL
MUCOUS THREADS UR QL AUTO: ABNORMAL
NITRITE UR QL STRIP: POSITIVE
NON-SQ EPI CELLS URNS QL MICRO: ABNORMAL /HPF
PH UR STRIP.AUTO: 6 [PH]
PROT UR STRIP-MCNC: ABNORMAL MG/DL
RBC #/AREA URNS AUTO: ABNORMAL /HPF
SL AMB  POCT GLUCOSE, UA: ABNORMAL
SL AMB LEUKOCYTE ESTERASE,UA: ABNORMAL
SL AMB POCT BILIRUBIN,UA: ABNORMAL
SL AMB POCT BLOOD,UA: ABNORMAL
SL AMB POCT CLARITY,UA: CLEAR
SL AMB POCT COLOR,UA: YELLOW
SL AMB POCT KETONES,UA: ABNORMAL
SL AMB POCT NITRITE,UA: POSITIVE
SL AMB POCT PH,UA: 5
SL AMB POCT SPECIFIC GRAVITY,UA: 1.02
SL AMB POCT URINE PROTEIN: ABNORMAL
SL AMB POCT UROBILINOGEN: 0.2
SP GR UR STRIP.AUTO: 1.02 (ref 1–1.03)
UROBILINOGEN UR STRIP-ACNC: <2 MG/DL
WBC #/AREA URNS AUTO: ABNORMAL /HPF

## 2022-07-12 PROCEDURE — 1160F RVW MEDS BY RX/DR IN RCRD: CPT | Performed by: PHYSICIAN ASSISTANT

## 2022-07-12 PROCEDURE — 81002 URINALYSIS NONAUTO W/O SCOPE: CPT | Performed by: PHYSICIAN ASSISTANT

## 2022-07-12 PROCEDURE — 87186 SC STD MICRODIL/AGAR DIL: CPT | Performed by: PHYSICIAN ASSISTANT

## 2022-07-12 PROCEDURE — 81001 URINALYSIS AUTO W/SCOPE: CPT | Performed by: PHYSICIAN ASSISTANT

## 2022-07-12 PROCEDURE — 87086 URINE CULTURE/COLONY COUNT: CPT | Performed by: PHYSICIAN ASSISTANT

## 2022-07-12 PROCEDURE — 87077 CULTURE AEROBIC IDENTIFY: CPT | Performed by: PHYSICIAN ASSISTANT

## 2022-07-12 PROCEDURE — 99204 OFFICE O/P NEW MOD 45 MIN: CPT | Performed by: PHYSICIAN ASSISTANT

## 2022-07-12 NOTE — PROGRESS NOTES
7/12/2022      Chief Complaint   Patient presents with    Nephrolithiasis         Assessment and Plan    78 y o  female managed by NEW PATIENT    1  Left renal calculus- US April 2022  2  UTI- Cx Dec 2021  3  Constipation- ongoing; increase free water, miralax prn    She has no UTI symptoms today  Interestingly her urinalysis shows nitrites and she is asymptomatic  Culture sent for evaluation  She is no longer having left flank pain, reports the pain has moved over to the right flank now  Feels the same  No prior stone history or passage  I recommend a comparison film including KUB at this time for further evaluation of possible renal calculi  Overall I do not recommend surgical intervention for the reported left lower pole renal calculus  I do recommend increase free water goal 80oz daily, low sodium low protein diet for stone prevention which she is interested in  General /GYN hygiene reviewed as well as first line uti prevention techniques  Yearly surveillance with KUB and/or ultrasound will be used as well  History of Present Illness  Delfina Inman is a 78 y o  female here for evaluation of left renal calculus, dysuria, urinary incontinence  She had a Klebsiella and Proteus UTI seven months ago, treated and resolved  Over the spring she had had a couple episodes of dysuria with normal urine testing treated with increased water intake  She was on/off antibiotic levaquin for upper respiratory infections at the time as well  She has not had hematuria or bladder pain  She developed left flank pain on and off about three months ago but has since resolved  A renal bladder ultrasound demonstrates a 6 mm left lower pole shadow presumably calculus, with no hydronephrosis   She presents today with none of her prior complaints, but new right side pain which the robaxin seemed to help and she requests refill which she will discuss with her pcp team  She also reports ongoing constipation and bloating, maybe a cause of her abdominal pain  Review of Systems   Constitutional: Negative for activity change, appetite change, chills, fever and unexpected weight change  HENT: Negative  Respiratory: Negative  Negative for shortness of breath  Cardiovascular: Negative  Negative for chest pain and leg swelling  Gastrointestinal: Negative for abdominal pain, diarrhea, nausea and vomiting  Endocrine: Negative  Genitourinary: Negative for decreased urine volume, difficulty urinating, dysuria, flank pain, frequency, hematuria, pelvic pain, urgency and vaginal pain  Musculoskeletal: Negative for back pain and gait problem  Skin: Negative  Allergic/Immunologic: Negative  Neurological: Negative  Hematological: Negative for adenopathy  Does not bruise/bleed easily  Vitals  Vitals:    07/12/22 1551   BP: 120/70   BP Location: Right arm   Patient Position: Sitting   Cuff Size: Standard   Pulse: 68   Weight: 81 6 kg (179 lb 12 8 oz)   Height: 5' (1 524 m)       Physical Exam  Vitals and nursing note reviewed  Constitutional:       General: She is not in acute distress  Appearance: Normal appearance  She is well-developed  She is not diaphoretic  HENT:      Head: Normocephalic and atraumatic  Pulmonary:      Effort: Pulmonary effort is normal    Abdominal:      Palpations: There is no mass  Tenderness: There is no abdominal tenderness  There is no right CVA tenderness or left CVA tenderness  Hernia: No hernia is present  Musculoskeletal:      Right lower leg: No edema  Left lower leg: No edema  Skin:     General: Skin is warm and dry  Neurological:      General: No focal deficit present  Mental Status: She is alert and oriented to person, place, and time        Gait: Gait normal    Psychiatric:         Mood and Affect: Mood normal          Speech: Speech normal          Behavior: Behavior normal            Past History  Past Medical History:   Diagnosis Date    Tibia/fibula fracture 2016    ORIF     Social History     Socioeconomic History    Marital status: /Civil Union     Spouse name: None    Number of children: None    Years of education: None    Highest education level: None   Occupational History    None   Tobacco Use    Smoking status: Never Smoker    Smokeless tobacco: Never Used   Vaping Use    Vaping Use: Never used   Substance and Sexual Activity    Alcohol use: No    Drug use: No    Sexual activity: Yes     Partners: Male   Other Topics Concern    None   Social History Narrative    None     Social Determinants of Health     Financial Resource Strain: Not on file   Food Insecurity: Not on file   Transportation Needs: Not on file   Physical Activity: Not on file   Stress: Not on file   Social Connections: Not on file   Intimate Partner Violence: Not on file   Housing Stability: Not on file     Social History     Tobacco Use   Smoking Status Never Smoker   Smokeless Tobacco Never Used     Family History   Problem Relation Age of Onset    Breast cancer Mother     Diabetes Mother     Breast cancer Maternal Grandmother     Diabetes Paternal Grandmother     Cancer Family        The following portions of the patient's history were reviewed and updated as appropriate: allergies, current medications, past medical history, past social history, past surgical history and problem list     Results  Recent Results (from the past 1 hour(s))   POCT urine dip    Collection Time: 07/12/22  4:04 PM   Result Value Ref Range    LEUKOCYTE ESTERASE,UA small     NITRITE,UA positive     SL AMB POCT UROBILINOGEN 0 2     POCT URINE PROTEIN neg      PH,UA 5 0     BLOOD,UA neg     SPECIFIC GRAVITY,UA 1 020     KETONES,UA neg     BILIRUBIN,UA neg     GLUCOSE, UA neg      COLOR,UA yellow     CLARITY,UA clear    ]  No results found for: PSA  Lab Results   Component Value Date    CALCIUM 9 4 12/09/2020    K 3 8 12/09/2020    CO2 30 12/09/2020     12/09/2020    BUN 10 12/09/2020    CREATININE 0 61 12/09/2020     Lab Results   Component Value Date    WBC 5 70 12/09/2020    HGB 13 2 12/09/2020    HCT 43 1 12/09/2020    MCV 87 12/09/2020     12/09/2020

## 2022-07-12 NOTE — PATIENT INSTRUCTIONS
Dietary Management of Kidney Stone Disease    The dietary recommendations for most people who make kidney stones (especially the most common calcium oxalate stones) are uncomplicated and are not too tedious or bland  Most importantly, the following recommendations also promote better health for a variety of reasons  FLUIDS:  The single most important change for the majority patients is the need to greatly increase fluid intake  You should at least produce two liters (about two quarts) of urine each day  Depending on the heat outdoors and your level of physical activity, this usually means consuming ten, 10 ounce glasses (100 ounces) of fluid per day  Water is always a good choice, but other drinks including tea, coffee, soda, and juice are also allowed as long as no one beverage becomes the sole source of fluid  CALCIUM:  There is excellent evidence that calcium should not be avoided, but instead moderated  A range of 600 to 1,100 mg of calcium per day, especially consumed at meals is probably a reasonable target  (i e  2-3 dairy servings per day) This might include small servings of yogurt, milk or ice cream   This amount helps avoid over-absorption of oxalate from the digestive tract and also allows for healthy bone maintenance  SODIUM (SALT): Too much salt in your diet (both from the shaker and in the prepared foods that we buy) is bad for your blood pressure, bad for your heart, and also increases the amount of calcium in your urine  A reasonable sodium restriction to 2,000-2,500mg/day (about the amount in one teaspoon) is an excellent target  You should get into the habit of reading the Nutrients labels on all the foods that you eat and watch out for the foods that have a high sodium content (snack foods, smoked or processed foods, caned foods)  Fresh and frozen foods usually have the least amount of sodium      PROTEIN:  High protein diets from animal meat (beef, chicken, pork, fish) also increases the rate of kidney stone formation and is equally unhealthy for your heart  All patients should moderate their meat intake to 3-7 ounces per day, and particularly stay away from red meat protein  OXALATE:  Most stone-formers should avoid heavy intake of oxalate-rich foods  These include green roughage (spinach, mustard, kale), strawberries, chocolate, tea, iced tea, and nuts  In addition, heavy, excess doses of Vitamin C can also produce surges in urinary oxalate levels and should be avoided  BARE-BONES RECOMMENDATIONS:  Fluids, fluids, fluids  Low salt diet (your primary care doctor will love you)  Moderate calcium (dairy products), especially with meals  Moderate red meat intake

## 2022-07-15 ENCOUNTER — TELEPHONE (OUTPATIENT)
Dept: UROLOGY | Facility: CLINIC | Age: 67
End: 2022-07-15

## 2022-07-15 DIAGNOSIS — M54.50 ACUTE BILATERAL LOW BACK PAIN WITHOUT SCIATICA: ICD-10-CM

## 2022-07-15 DIAGNOSIS — N39.0 URINARY TRACT INFECTION WITHOUT HEMATURIA, SITE UNSPECIFIED: Primary | ICD-10-CM

## 2022-07-15 LAB
BACTERIA UR CULT: ABNORMAL
BACTERIA UR CULT: ABNORMAL

## 2022-07-15 RX ORDER — SULFAMETHOXAZOLE AND TRIMETHOPRIM 800; 160 MG/1; MG/1
1 TABLET ORAL 2 TIMES DAILY
Qty: 14 TABLET | Refills: 0 | Status: SHIPPED | OUTPATIENT
Start: 2022-07-15 | End: 2022-07-22

## 2022-07-15 NOTE — TELEPHONE ENCOUNTER
Called and spoke with patient  Informed patient that 7 day course of  Bactrim was sent to her pharmacy

## 2022-07-15 NOTE — TELEPHONE ENCOUNTER
Culture from [facility] the other day showing bacteria  Intermittently symptomatic  I recommend a 7 day course of bactrim   Thanks

## 2022-07-16 RX ORDER — METHOCARBAMOL 500 MG/1
TABLET, FILM COATED ORAL
Qty: 30 TABLET | Refills: 0 | Status: SHIPPED | OUTPATIENT
Start: 2022-07-16

## 2022-07-19 ENCOUNTER — APPOINTMENT (OUTPATIENT)
Dept: RADIOLOGY | Facility: MEDICAL CENTER | Age: 67
End: 2022-07-19
Payer: COMMERCIAL

## 2022-07-19 DIAGNOSIS — N20.0 CALCULUS OF KIDNEY: ICD-10-CM

## 2022-07-19 PROCEDURE — 74018 RADEX ABDOMEN 1 VIEW: CPT

## 2022-07-20 ENCOUNTER — TELEPHONE (OUTPATIENT)
Dept: OTHER | Facility: HOSPITAL | Age: 67
End: 2022-07-20

## 2022-07-20 DIAGNOSIS — N20.0 CALCULUS OF KIDNEY: Primary | ICD-10-CM

## 2022-07-20 NOTE — TELEPHONE ENCOUNTER
Contacted patient and made her aware of KUB/US results  Advised patient she will have repeat US completed in 1 year for surveillance  She verbalized understanding

## 2022-07-20 NOTE — TELEPHONE ENCOUNTER
Can let Ms Bradlye Beard know her KUB is normal, no sizable or visible stones   US picked up a left lower pole 6mm stone so will use US in 1 year for surveillance

## 2022-10-12 PROBLEM — J01.40 ACUTE NON-RECURRENT PANSINUSITIS: Status: RESOLVED | Noted: 2021-11-17 | Resolved: 2022-10-12

## 2023-08-30 ENCOUNTER — TELEPHONE (OUTPATIENT)
Dept: FAMILY MEDICINE CLINIC | Facility: CLINIC | Age: 68
End: 2023-08-30

## 2023-08-30 NOTE — TELEPHONE ENCOUNTER
Returned patients call. She is requesting labs before her upcoming appointment for diabetes testing .      Thank you

## 2023-08-31 DIAGNOSIS — E78.00 HYPERCHOLESTEROLEMIA: Primary | ICD-10-CM

## 2023-08-31 DIAGNOSIS — Z13.1 SCREENING FOR DIABETES MELLITUS: ICD-10-CM

## 2023-08-31 DIAGNOSIS — Z13.0 SCREENING, ANEMIA, DEFICIENCY, IRON: ICD-10-CM

## 2023-08-31 DIAGNOSIS — Z13.29 SCREENING FOR THYROID DISORDER: ICD-10-CM

## 2023-09-01 ENCOUNTER — TELEPHONE (OUTPATIENT)
Dept: FAMILY MEDICINE CLINIC | Facility: CLINIC | Age: 68
End: 2023-09-01

## 2023-09-01 NOTE — TELEPHONE ENCOUNTER
Pt left voicemail stating:    I'm calling to see with my insurance, would there be an additional charge for me to come in for a COVID shot, a flu shot and an RSV shot?

## 2023-09-11 ENCOUNTER — APPOINTMENT (OUTPATIENT)
Dept: LAB | Facility: CLINIC | Age: 68
End: 2023-09-11
Payer: COMMERCIAL

## 2023-09-11 DIAGNOSIS — Z13.29 SCREENING FOR THYROID DISORDER: ICD-10-CM

## 2023-09-11 DIAGNOSIS — Z13.0 SCREENING, ANEMIA, DEFICIENCY, IRON: ICD-10-CM

## 2023-09-11 DIAGNOSIS — Z13.1 SCREENING FOR DIABETES MELLITUS: ICD-10-CM

## 2023-09-11 DIAGNOSIS — E78.00 HYPERCHOLESTEROLEMIA: ICD-10-CM

## 2023-09-11 LAB
ALBUMIN SERPL BCP-MCNC: 4 G/DL (ref 3.5–5)
ALP SERPL-CCNC: 77 U/L (ref 34–104)
ALT SERPL W P-5'-P-CCNC: 13 U/L (ref 7–52)
ANION GAP SERPL CALCULATED.3IONS-SCNC: 8 MMOL/L
AST SERPL W P-5'-P-CCNC: 13 U/L (ref 13–39)
BASOPHILS # BLD AUTO: 0.03 THOUSANDS/ÂΜL (ref 0–0.1)
BASOPHILS NFR BLD AUTO: 1 % (ref 0–1)
BILIRUB SERPL-MCNC: 0.35 MG/DL (ref 0.2–1)
BUN SERPL-MCNC: 15 MG/DL (ref 5–25)
CALCIUM SERPL-MCNC: 9 MG/DL (ref 8.4–10.2)
CHLORIDE SERPL-SCNC: 107 MMOL/L (ref 96–108)
CHOLEST SERPL-MCNC: 260 MG/DL
CO2 SERPL-SCNC: 26 MMOL/L (ref 21–32)
CREAT SERPL-MCNC: 0.75 MG/DL (ref 0.6–1.3)
EOSINOPHIL # BLD AUTO: 0.4 THOUSAND/ÂΜL (ref 0–0.61)
EOSINOPHIL NFR BLD AUTO: 7 % (ref 0–6)
ERYTHROCYTE [DISTWIDTH] IN BLOOD BY AUTOMATED COUNT: 15.5 % (ref 11.6–15.1)
GFR SERPL CREATININE-BSD FRML MDRD: 82 ML/MIN/1.73SQ M
GLUCOSE P FAST SERPL-MCNC: 95 MG/DL (ref 65–99)
HCT VFR BLD AUTO: 43 % (ref 34.8–46.1)
HDLC SERPL-MCNC: 62 MG/DL
HGB BLD-MCNC: 13.1 G/DL (ref 11.5–15.4)
IMM GRANULOCYTES # BLD AUTO: 0.02 THOUSAND/UL (ref 0–0.2)
IMM GRANULOCYTES NFR BLD AUTO: 0 % (ref 0–2)
LDLC SERPL CALC-MCNC: 179 MG/DL (ref 0–100)
LYMPHOCYTES # BLD AUTO: 1.24 THOUSANDS/ÂΜL (ref 0.6–4.47)
LYMPHOCYTES NFR BLD AUTO: 22 % (ref 14–44)
MCH RBC QN AUTO: 26.5 PG (ref 26.8–34.3)
MCHC RBC AUTO-ENTMCNC: 30.5 G/DL (ref 31.4–37.4)
MCV RBC AUTO: 87 FL (ref 82–98)
MONOCYTES # BLD AUTO: 0.59 THOUSAND/ÂΜL (ref 0.17–1.22)
MONOCYTES NFR BLD AUTO: 10 % (ref 4–12)
NEUTROPHILS # BLD AUTO: 3.39 THOUSANDS/ÂΜL (ref 1.85–7.62)
NEUTS SEG NFR BLD AUTO: 60 % (ref 43–75)
NONHDLC SERPL-MCNC: 198 MG/DL
NRBC BLD AUTO-RTO: 0 /100 WBCS
PLATELET # BLD AUTO: 268 THOUSANDS/UL (ref 149–390)
PMV BLD AUTO: 10.6 FL (ref 8.9–12.7)
POTASSIUM SERPL-SCNC: 4.3 MMOL/L (ref 3.5–5.3)
PROT SERPL-MCNC: 7 G/DL (ref 6.4–8.4)
RBC # BLD AUTO: 4.95 MILLION/UL (ref 3.81–5.12)
SODIUM SERPL-SCNC: 141 MMOL/L (ref 135–147)
TRIGL SERPL-MCNC: 96 MG/DL
TSH SERPL DL<=0.05 MIU/L-ACNC: 4.2 UIU/ML (ref 0.45–4.5)
WBC # BLD AUTO: 5.67 THOUSAND/UL (ref 4.31–10.16)

## 2023-09-11 PROCEDURE — 84443 ASSAY THYROID STIM HORMONE: CPT

## 2023-09-11 PROCEDURE — 85025 COMPLETE CBC W/AUTO DIFF WBC: CPT

## 2023-09-11 PROCEDURE — 80061 LIPID PANEL: CPT

## 2023-09-11 PROCEDURE — 36415 COLL VENOUS BLD VENIPUNCTURE: CPT

## 2023-09-11 PROCEDURE — 80053 COMPREHEN METABOLIC PANEL: CPT

## 2023-10-18 ENCOUNTER — OFFICE VISIT (OUTPATIENT)
Dept: FAMILY MEDICINE CLINIC | Facility: CLINIC | Age: 68
End: 2023-10-18
Payer: COMMERCIAL

## 2023-10-18 VITALS
SYSTOLIC BLOOD PRESSURE: 126 MMHG | HEART RATE: 72 BPM | DIASTOLIC BLOOD PRESSURE: 62 MMHG | BODY MASS INDEX: 33.99 KG/M2 | TEMPERATURE: 97.8 F | HEIGHT: 61 IN | OXYGEN SATURATION: 98 % | WEIGHT: 180 LBS

## 2023-10-18 DIAGNOSIS — D22.9 MULTIPLE NEVI: ICD-10-CM

## 2023-10-18 DIAGNOSIS — Z78.0 POSTMENOPAUSAL: ICD-10-CM

## 2023-10-18 DIAGNOSIS — R14.0 BLOATING: ICD-10-CM

## 2023-10-18 DIAGNOSIS — Z13.820 SCREENING FOR OSTEOPOROSIS: ICD-10-CM

## 2023-10-18 DIAGNOSIS — K59.00 CONSTIPATION, UNSPECIFIED CONSTIPATION TYPE: Primary | ICD-10-CM

## 2023-10-18 DIAGNOSIS — K63.5 POLYP OF COLON, UNSPECIFIED PART OF COLON, UNSPECIFIED TYPE: ICD-10-CM

## 2023-10-18 DIAGNOSIS — R10.13 EPIGASTRIC PAIN: ICD-10-CM

## 2023-10-18 DIAGNOSIS — L81.9 DISCOLORATION OF SKIN: ICD-10-CM

## 2023-10-18 DIAGNOSIS — Z12.31 ENCOUNTER FOR SCREENING MAMMOGRAM FOR MALIGNANT NEOPLASM OF BREAST: ICD-10-CM

## 2023-10-18 DIAGNOSIS — E78.00 HYPERCHOLESTEROLEMIA: ICD-10-CM

## 2023-10-18 DIAGNOSIS — H61.23 BILATERAL IMPACTED CERUMEN: ICD-10-CM

## 2023-10-18 DIAGNOSIS — R30.0 DYSURIA: ICD-10-CM

## 2023-10-18 LAB
SL AMB  POCT GLUCOSE, UA: NEGATIVE
SL AMB LEUKOCYTE ESTERASE,UA: NEGATIVE
SL AMB POCT BILIRUBIN,UA: NEGATIVE
SL AMB POCT BLOOD,UA: NEGATIVE
SL AMB POCT CLARITY,UA: CLEAR
SL AMB POCT COLOR,UA: YELLOW
SL AMB POCT KETONES,UA: NEGATIVE
SL AMB POCT NITRITE,UA: POSITIVE
SL AMB POCT PH,UA: 5.5
SL AMB POCT SPECIFIC GRAVITY,UA: 1.02
SL AMB POCT URINE PROTEIN: NEGATIVE
SL AMB POCT UROBILINOGEN: ABNORMAL

## 2023-10-18 PROCEDURE — 99214 OFFICE O/P EST MOD 30 MIN: CPT | Performed by: NURSE PRACTITIONER

## 2023-10-18 PROCEDURE — 81003 URINALYSIS AUTO W/O SCOPE: CPT | Performed by: NURSE PRACTITIONER

## 2023-10-18 PROCEDURE — 87086 URINE CULTURE/COLONY COUNT: CPT | Performed by: NURSE PRACTITIONER

## 2023-10-18 NOTE — ASSESSMENT & PLAN NOTE
Abdominal assessment unremarkable today   Patient with long history of constipation  Overdue for GI follow-up  Referral placed today to establish in our network    Education provided:   Encourage increased hydration and fiber  Encourage scheduled bowel movements

## 2023-10-18 NOTE — ASSESSMENT & PLAN NOTE
Reports UTI symptoms on and off for the last 3 weeks  History of kidney stones  Urine dip today: Positive nitrites only  Will send for culture    Encourage hydration

## 2023-10-18 NOTE — PROGRESS NOTES
[facility]    ASSESSMENT AND PLAN     1. Constipation, unspecified constipation type  Assessment & Plan:  Abdominal assessment unremarkable today   Patient with long history of constipation  Overdue for GI follow-up  Referral placed today to establish in our network    Education provided:   Encourage increased hydration and fiber  Encourage scheduled bowel movements    Orders:  -     Ambulatory Referral to Gastroenterology; Future    2. Polyp of colon, unspecified part of colon, unspecified type  -     Ambulatory Referral to Gastroenterology; Future    3. Bloating  -     Ambulatory Referral to Gastroenterology; Future    4. Epigastric pain  -     Ambulatory Referral to Gastroenterology; Future    5. Hypercholesterolemia  Assessment & Plan:  Recent lab work reviewed   History of elevated lipid panel   Appears genetic  Reports intolerant to statin-myalgia   Cholesterol today: Total 260;   Agreeable to referral to cardiology for risk evaluation and potential alternate treatment options    Orders:  -     Ambulatory Referral to Cardiology; Future    6. Encounter for screening mammogram for malignant neoplasm of breast  -     Mammo screening bilateral w 3d & cad; Future; Expected date: 10/18/2023    7. Screening for osteoporosis  -     DXA bone density spine hip and pelvis; Future; Expected date: 10/18/2023    8. Postmenopausal  -     DXA bone density spine hip and pelvis; Future; Expected date: 10/18/2023    9. Dysuria  Assessment & Plan:  Reports UTI symptoms on and off for the last 3 weeks  History of kidney stones  Urine dip today: Positive nitrites only  Will send for culture    Encourage hydration    Orders:  -     POCT urine dip auto non-scope  -     Urine culture    10. Discoloration of skin  -     Ambulatory Referral to Dermatology; Future    11. Multiple nevi  -     Ambulatory Referral to Dermatology; Future    12.  Bilateral impacted cerumen  -     carbamide peroxide (DEBROX) 6.5 % otic solution; Administer 5 drops into both ears 2 (two) times a day           SUBJECTIVE       Patient ID: Mickie Duane is a 70 y.o. female. Chief Complaint   Patient presents with    Follow-up       HISTORY OF PRESENT ILLNESS    Patient presents today with complaint of constipation. States that has been on and off for years, but has been recently worsening. OTC medications ineffective. She saw GI several years ago, and was prescribed Linzess. Believes it may have worked, but was too expensive and she could not afford it. Reports her last colonoscopy was in 2019, and she states she did have polyps and diverticuli. She is unsure of the timing of her follow-up colonoscopy. Last colonoscopy was through  GI. Reports she has been having recent abdominal pain with constipation. Feels like she incompletely empties. Reports it feels as if she still has stool inside even after bowel movement. She has to bear down hard to have a bowel movement, and reports she feels like she is going to pass out at times when she does that. Reports an increased, foul-smelling flatulence. Denies any bowel incontinence. The following portions of the patient's history were reviewed and updated as appropriate: allergies, current medications, past family history, past medical history, past social history, past surgical history, and problem list.    REVIEW OF SYSTEMS  Review of Systems   Constitutional: Negative. HENT: Negative. Respiratory: Negative. Cardiovascular: Negative. Gastrointestinal:  Positive for abdominal distention, abdominal pain and constipation. Negative for blood in stool, diarrhea, nausea and vomiting. Genitourinary:  Positive for dysuria and frequency. Negative for decreased urine volume, difficulty urinating, flank pain, pelvic pain and urgency. Neurological: Negative. Psychiatric/Behavioral: Negative.          OBJECTIVE      VITAL SIGNS  /62 (BP Location: Left arm, Patient Position: Sitting, Cuff Size: Adult)   Pulse 72   Temp 97.8 °F (36.6 °C)   Ht 5' 1" (1.549 m)   Wt 81.6 kg (180 lb)   LMP  (LMP Unknown)   SpO2 98%   BMI 34.01 kg/m²     CURRENT MEDICATIONS    Current Outpatient Medications:     albuterol (Ventolin HFA) 90 mcg/act inhaler, Inhale 2 puffs every 6 (six) hours as needed for wheezing, Disp: 18 g, Rfl: 0    carbamide peroxide (DEBROX) 6.5 % otic solution, Administer 5 drops into both ears 2 (two) times a day, Disp: 15 mL, Rfl: 0    Omega-3 Fatty Acids (OMEGA 3 PO), Take 1 capsule by mouth daily, Disp: , Rfl:     methocarbamol (ROBAXIN) 500 mg tablet, TAKE ONE TABLET BY MOUTH FOUR TIMES DAILY AS NEEDED for muscle spasms (Patient not taking: Reported on 10/18/2023), Disp: 30 tablet, Rfl: 0      PHYSICAL EXAMINATION   Physical Exam  Vitals and nursing note reviewed. Constitutional:       General: She is not in acute distress. Appearance: Normal appearance. She is well-developed and well-groomed. She is not ill-appearing. HENT:      Head: Normocephalic. Right Ear: Ear canal normal. There is impacted cerumen. Left Ear: Ear canal normal. There is impacted cerumen. Ears:      Comments: Advised OTC Debrox  Follow-up in office for lavage if ineffective     Nose: Nose normal.      Mouth/Throat:      Pharynx: Oropharynx is clear. Eyes:      Pupils: Pupils are equal, round, and reactive to light. Neck:      Thyroid: No thyromegaly. Vascular: No carotid bruit. Cardiovascular:      Rate and Rhythm: Normal rate and regular rhythm. Heart sounds: Normal heart sounds. Pulmonary:      Effort: Pulmonary effort is normal. No respiratory distress. Breath sounds: Normal breath sounds and air entry. Abdominal:      General: Bowel sounds are normal.      Palpations: Abdomen is soft. Tenderness: There is no abdominal tenderness. There is no guarding or rebound. Musculoskeletal:      Right lower leg: No edema.       Left lower leg: No edema.   Lymphadenopathy:      Cervical: No cervical adenopathy. Skin:     General: Skin is warm and dry. Comments: Suspect tinea versicolor on anterior chest wall  Multiple nevi scattered posterior back  Referral today to dermatology for skin evaluation   Neurological:      General: No focal deficit present. Mental Status: She is alert and oriented to person, place, and time.    Psychiatric:         Attention and Perception: Attention normal.         Mood and Affect: Mood normal.         Behavior: Behavior normal.

## 2023-10-21 LAB — BACTERIA UR CULT: NORMAL

## 2023-11-15 ENCOUNTER — CONSULT (OUTPATIENT)
Dept: GASTROENTEROLOGY | Facility: CLINIC | Age: 68
End: 2023-11-15
Payer: COMMERCIAL

## 2023-11-15 ENCOUNTER — TELEPHONE (OUTPATIENT)
Dept: GASTROENTEROLOGY | Facility: CLINIC | Age: 68
End: 2023-11-15

## 2023-11-15 VITALS
DIASTOLIC BLOOD PRESSURE: 83 MMHG | WEIGHT: 180 LBS | BODY MASS INDEX: 33.99 KG/M2 | HEIGHT: 61 IN | SYSTOLIC BLOOD PRESSURE: 129 MMHG | HEART RATE: 77 BPM | OXYGEN SATURATION: 99 % | TEMPERATURE: 99.4 F

## 2023-11-15 DIAGNOSIS — K59.00 CONSTIPATION, UNSPECIFIED CONSTIPATION TYPE: ICD-10-CM

## 2023-11-15 DIAGNOSIS — R14.0 BLOATING: ICD-10-CM

## 2023-11-15 DIAGNOSIS — K63.5 POLYP OF COLON, UNSPECIFIED PART OF COLON, UNSPECIFIED TYPE: ICD-10-CM

## 2023-11-15 DIAGNOSIS — R10.13 EPIGASTRIC PAIN: ICD-10-CM

## 2023-11-15 PROCEDURE — 99214 OFFICE O/P EST MOD 30 MIN: CPT | Performed by: INTERNAL MEDICINE

## 2023-11-15 RX ORDER — POLYETHYLENE GLYCOL 3350 17 G/17G
17 POWDER, FOR SOLUTION ORAL DAILY
Qty: 850 G | Refills: 1 | Status: SHIPPED | OUTPATIENT
Start: 2023-11-15

## 2023-11-15 RX ORDER — BENZONATATE 200 MG/1
CAPSULE ORAL
COMMUNITY
Start: 2023-10-26

## 2023-11-15 RX ORDER — AMOXICILLIN AND CLAVULANATE POTASSIUM 875; 125 MG/1; MG/1
TABLET, FILM COATED ORAL
COMMUNITY
Start: 2023-10-26

## 2023-11-15 NOTE — TELEPHONE ENCOUNTER
ASC Screening    ASC Screening  BMI > than 45: No  Are you currently pregnant?: No  Do you rely on a wheelchair for mobility?: No  Do you need oxygen during the day?: No  Have you ever been informed by anesthesia that you have a difficult airway?: No  Have you been diagnosed with End Stage Renal Disease (ESRD)?: No  Are you actively on dialysis?: No  Have you been diagnosed with Pulmonary Hypertension?: No  Do you have a pacemaker or an Automatic Implantable Cardioverter Defibrillator (AICD)?: No  Have you ever had an organ transplant?: No  Have you had a stroke, heart attack, myocardial infarction (MI) within the last 6 months?: No  Have you ever been diagnosed with Aortic Stenosis?: No  Have you ever been diagnosed  with Congestive Heart Failure?: No  Have you ever been diagnosed with a heart valve disease?: No  Are you Diabetic?: No  If you are Diabetic, has your A1C been greater than 12 within the last six months?: N/A       Scheduled date of colon (as of today) dec 28  Physician performing: Dr. Danika Keita  Location of procedure:   [facility]  Instructions given to patient: Nilda  Clearances: n/a

## 2023-11-15 NOTE — PROGRESS NOTES
[facility] - Outpatient Consultation  Shaye Calles 70 y.o. female MRN: [id number]  Encounter: [id number]    HPI:    Shaye Calles is a 70 y.o. female who presents with complaint of chronic constipation. She also needs to schedule screening colonoscopy. She reports that her last colonoscopy was 5 years ago she had polyps and diverticulosis. She had 2 uncles with colon cancer. She has chronic constipation which she says started about 25 years ago after she had hysterectomy for severe extrauterine endometriosis. She says that her surgeon told her that her bowel was encased by endometriosis and it was a very difficult procedure. She has been constipated ever since. Her previous gastroenterologist prescribed Linzess, but she could not afford it. She now takes 4 Dulcolax tablets per day, 3 days a week. She is then able to move her bowels 3 times per week. She becomes nauseated and has abdominal pain when she is constipated. She also becomes bloated and distended. Her stools are initially hard, then they become liquid. Feels like her stools are incomplete. No GERD. Nausea and vomiting. TSH from September normal.  CBC and CMP were also normal.    REVIEW OF SYSTEMS:  CONSTITUTIONAL: Denies any fever, chills, rigors, and weight loss. HEENT: No earache or tinnitus. Denies hearing loss or visual disturbances. CARDIOVASCULAR: No chest pain or palpitations. RESPIRATORY: Denies any cough, hemoptysis, shortness of breath or dyspnea on exertion. GASTROINTESTINAL: As noted in the History of Present Illness. GENITOURINARY: No problems with urination. Denies any hematuria or dysuria. NEUROLOGIC: No dizziness or vertigo, denies headaches. MUSCULOSKELETAL: Denies any muscle or joint pain. SKIN: Denies skin rashes or itching. ENDOCRINE: Denies excessive thirst. Denies intolerance to heat or cold. PSYCHOSOCIAL: Denies depression or anxiety.  Denies any recent memory loss.     Historical Information   Past Medical History:   Diagnosis Date    Tibia/fibula fracture 2016    ORIF     Past Surgical History:   Procedure Laterality Date     SECTION      x3:  10/30/1977(F), 1980 (F), 1981 (M)    HYSTERECTOMY      LEG SURGERY Right     fracture     Social History   Social History     Substance and Sexual Activity   Alcohol Use No     Social History     Substance and Sexual Activity   Drug Use No     Social History     Tobacco Use   Smoking Status Never   Smokeless Tobacco Never     Family History   Problem Relation Age of Onset    Breast cancer Mother     Diabetes Mother     Breast cancer Maternal Grandmother     Diabetes Paternal Grandmother     Cancer Family        Meds/Allergies       Current Outpatient Medications:     benzonatate (TESSALON) 200 MG capsule    Omega-3 Fatty Acids (OMEGA 3 PO)    albuterol (Ventolin HFA) 90 mcg/act inhaler    amoxicillin-clavulanate (AUGMENTIN) 875-125 mg per tablet    carbamide peroxide (DEBROX) 6.5 % otic solution    methocarbamol (ROBAXIN) 500 mg tablet    Allergies   Allergen Reactions    Simvastatin Other (See Comments)     MUSCLE WEAKNESS       Objective   Blood pressure 129/83, pulse 77, temperature 99.4 °F (37.4 °C), temperature source Tympanic, height 5' 1" (1.549 m), weight 81.6 kg (180 lb), SpO2 99 %, not currently breastfeeding. Body mass index is 34.01 kg/m². PHYSICAL EXAM:    General Appearance:   Alert, cooperative, no distress   HEENT:   Normocephalic, atraumatic, anicteric. Neck:  Supple, symmetrical, trachea midline   Lungs:   Clear to auscultation bilaterally; no rales, rhonchi or wheezing; respirations unlabored    Heart[de-identified]   Regular rate and rhythm; no murmur, rub, or gallop.    Abdomen:   Soft, non-tender, non-distended; normal bowel sounds; no masses, no organomegaly    Genitalia:   Deferred    Rectal:   Deferred    Extremities:  No cyanosis, clubbing or edema    Pulses:  2+ and symmetric    Skin:  No jaundice, rashes, or lesions    Lymph nodes:  No palpable cervical lymphadenopathy        Lab Results:   No visits with results within 1 Day(s) from this visit. Latest known visit with results is:   Office Visit on 10/18/2023   Component Date Value     COLOR,UA 10/18/2023 yellow     CLARITY,UA 10/18/2023 clear     SPECIFIC GRAVITY,UA 10/18/2023 1.025      PH,UA 10/18/2023 5.5     LEUKOCYTE ESTERASE,UA 10/18/2023 negative     NITRITE,UA 10/18/2023 positive     GLUCOSE, UA 10/18/2023 negative     KETONES,UA 10/18/2023 negative     BILIRUBIN,UA 10/18/2023 negative     BLOOD,UA 10/18/2023 negative     POCT URINE PROTEIN 10/18/2023 negative     SL AMB POCT UROBILINOGEN 10/18/2023 0.2 E.U     Urine Culture 10/18/2023 >100,000 cfu/ml        Lab Results   Component Value Date    WBC 5.67 09/11/2023    HGB 13.1 09/11/2023    HCT 43.0 09/11/2023    MCV 87 09/11/2023     09/11/2023       Lab Results   Component Value Date    SODIUM 141 09/11/2023    K 4.3 09/11/2023     09/11/2023    CO2 26 09/11/2023    AGAP 8 09/11/2023    BUN 15 09/11/2023    CREATININE 0.75 09/11/2023    GLUF 95 09/11/2023    CALCIUM 9.0 09/11/2023    AST 13 09/11/2023    ALT 13 09/11/2023    ALKPHOS 77 09/11/2023    TP 7.0 09/11/2023    TBILI 0.35 09/11/2023    EGFR 82 09/11/2023       No results found for: "CRP"    Lab Results   Component Value Date    BGY3JOXMYLLA 4.196 09/11/2023       No results found for: "IRON", "TIBC", "FERRITIN"    Radiology Results:   No results found. ______________________________________________________________________  ASSESSMENT AND PLAN:    Annie Shane is a 70 y.o. female with chronic constipation and history of polyps. She is due for colon cancer screening. 1.  We will schedule her screening colonoscopy. I discussed the risks of bleeding, infection, and perforation associated with endoscopic procedures. 2.  I want her to start taking MiraLAX 17 g twice daily.   She can adjust the dose up or down, and we discussed this. 3.  I also would like her to gradually increase her fiber intake. I have provided her with a table. Ideally, she should get to 20 to 25 g/day. I think that MiraLAX should come first to prevent worsening constipation. The fiber will be important in the long run to provide consistency. Return in 3 months. 1. Constipation, unspecified constipation type    2. Polyp of colon, unspecified part of colon, unspecified type    3. Bloating    4. Epigastric pain        No orders of the defined types were placed in this encounter.

## 2023-11-15 NOTE — PATIENT INSTRUCTIONS
High Fiber Diet   WHAT YOU NEED TO KNOW:   A high-fiber diet includes foods that have a high amount of fiber. Fiber is the part of fruits, vegetables, and grains that is not broken down by your body. Fiber keeps your bowel movements regular. Fiber can also help lower your cholesterol level, control blood sugar in people with diabetes, and relieve constipation. Fiber can also help you control your weight because it helps you feel full faster. Most adults should eat 25 to 35 grams of fiber each day. Talk to your dietitian or healthcare provider about the amount of fiber you need. DISCHARGE INSTRUCTIONS:   Good sources of fiber:       Foods with at least 4 grams of fiber per serving:      ? to ½ cup of high-fiber cereal (check the nutrition label on the box)    ½ cup of blackberries or raspberries    4 dried prunes    1 cooked artichoke    ½ cup of cooked legumes, such as lentils, or red, kidney, and lares beans    Foods with 1 to 3 grams of fiber per servin slice of whole-wheat, pumpernickel, or rye bread    ½ cup of cooked brown rice    4 whole-wheat crackers    1 cup of oatmeal    ½ cup of cereal with 1 to 3 grams of fiber per serving (check the nutrition label on the box)    1 small piece of fruit, such as an apple, banana, pear, kiwi, or orange    3 dates    ½ cup of canned apricots, fruit cocktail, peaches, or pears    ½ cup of raw or cooked vegetables, such as carrots, cauliflower, cabbage, spinach, squash, or corn  Ways that you can increase fiber in your diet:   Choose brown or wild rice instead of white rice. Use whole wheat flour in recipes instead of white or all-purpose flour. Add beans and peas to casseroles or soups. Choose fresh fruit and vegetables with peels or skins on instead of juices. Other diet guidelines to follow: Add fiber to your diet slowly. You may have abdominal discomfort, bloating, and gas if you add fiber to your diet too quickly.      Drink plenty of liquids as you add fiber to your diet. You may have nausea or develop constipation if you do not drink enough water. Ask how much liquid to drink each day and which liquids are best for you. © Copyright Kalpana Tanner 2023 Information is for End User's use only and may not be sold, redistributed or otherwise used for commercial purposes. The above information is an  only. It is not intended as medical advice for individual conditions or treatments. Talk to your doctor, nurse or pharmacist before following any medical regimen to see if it is safe and effective for you. Chelsie Dye  11/15/2023     Recommended Total Fiber Intake**    AGE  MEN  WOMAN    19-50  38 grams/day  25 grams/day    Over 50  30 grams/day  21 grams/day    Fiber Sources in Common Foods   Use this guide to find out if you have enough fiber in you diet.     Food  Size of Serving  Fiber Grams/Servings  Calories/   Serving  Food  Size of Serving  [employer]   Serving    Fruits: (raw unless otherwise noted  Vegetables: (cooked, unless otherwise noted)    Apple (w/peel)  1 medium  3.7  81  Artichoke  1 globe  6.5  60    Apricots  1 cup  3.7  74  Asparagus  ½ cup  1.8  25    Banana  1 medium  2.7  105  Beans:    Blackberries  1 cup  7.2  75  Green (canned)  ½ cup  1.3  14    Blueberries  1 cup  3.9  81  Kidney  ½ cup  5.7  114    Cantaloupe  1 cup  1.3  56  Lima  ½ cup  6.1  85    Grapefruit  1 medium  2.8  82  Walker  ½ cup  7.4  118    Grapes  1 cup  1.6  114  White  ½ cup  5.5  122    Orange  1 medium  3.1  62  Beets  ½ cup  1.6  37    Pear (with peel)  1 medium  4.0  98  Broccoli  ½ cup  2.8  26    Pineapple  1 cup  1.9  76  Cabbage, green  ½ cup  2.1  16    Plums  1 medium  1.0  36  Cabbage, green (raw)  ½ cup  0.8  9    Prunes (dried)  1 cup  11.4  386  Carrots  ½ cup  2.6  35    Raspberries  1 cup  8.4  60  Cauliflower  ½ cup  2.0  17    Strawberries  1 cup  3.4  45  Cauliflower (raw)  ½ cup  1.3  13    Watermelon 1 slice  0.8  51  Celery (raw)  ½ cup  1.0  10    GRAIN PRODUCTS AND OTHERS:  Corn  ½ cup  2.0  66    Bread:  Cucumber (raw)  ½ cup  0.4  7    Cambodian  1 slice  0.8  68  Eggplant  ½ cup  1.2  13    Rye  1 slice  1.6  67  Green Peas  ½ cup  4.4  62    White  1 slice  0.6  67  Lettuce, iceberg (raw)  ½ cup  0.4  4    Whole Wheat  1 slice  2.0  70  Onions (raw)  ½ cup  1.4  30    Cereal:  Potato (baked with skin)  ½ cup  1.5  66    Bran  1 ounce  9.7  70  Spinach  ½ cup  2.7  25    Corn Flakes  1 ounce  1.0  110  Tomato  ½ cup  1.0  19    Oat Bran  1 ounce  4.3  69  Zucchini  ½ cup  1.3  14    Oatmeal  1 ounce  3.0  109  METAMUCIL:    Shredded Wheat  1 ounce  2.8  102  Capsules  6 capsules  3.0  10    Crackers:  Smooth Texture Orange (sugar free)  1 tsp  3.0  20    Tutu  1 square  0.1  27  Smooth Texture Orange (with sugar)  1 tbsp  3.0  45    Saltine  1 regular  0.1  13  Wafers  2 wafers  3.0  120    Rice:    Brown  ½ cup  1.8  108    White  ½ cup  0.3  103    Spaghetti  2 ounces  2.1  225    Almonds (roasted)  ½ cup  6.4  351    Peanuts (roasted)  ½ cup  6.1  388      ** [employer], [employer], 2002   Track your fiber intake for five days. Use the Fiber Source Guide to find out how much fiber is in common food. If you’re not getting your recommended amount of fiber each day, talk to your doctor about how you can increase the fiber in your diet. Example  Monday Tuesday Wednesday Thursday Friday    Food  Oatmeal    Fiber Grams  2.8    Food  Blueberries    Fiber Grams  3.9    Food  W.W. Bread    Fiber Grams  1.9    Food  W.W. Bread    Fiber Grams  1.9    Food  Apple    Fiber Grams  3.7    Food  Spaghetti    Fiber Grams  . 14    Food  Corn    Fiber Grams  2.0    Food  White Bread    Fiber Grams  . 6    Food    Fiber Grams    Food    Fiber Grams    Food    Fiber Grams    Food    Fiber Grams    Food    Fiber Grams    Food    Fiber Grams    Food    Fiber Grams    Food    Fiber Grams Food    Fiber Grams    Food    Fiber Grams    Food    Fiber Grams    Food    Fiber Grams    Add numbers in each column to find your daily fiber intake. Total Daily Fiber Intake  18.2      Too Low - Like most Americans, this example is not enough fiber. Talk to your doctor about how to add fiber to your diet. Quick Fiber Facts    Most Americans consume only about half of the recommended fiber they need each day. Fiber helps maintain normal bowel function, and helps prevent constipation and its potential complications. Straining and pressure from constipation may lead to diverticular disease and hemorrhoids. Stool softeners or stimulant laxatives only offer short-term relief of constipation, while dietary changes or fiber therapies help break the cycle of irregularity. Diets low in saturated fat and cholesterol that include 7 grams of soluble fiber per day from psyllium husk, as in Metamucil, may reduce the risk of heart disease by lowering cholesterol. One adult dose of Metamucil has 2.4 grams of this soluable fiber.     Increase fiber intake gradually, giving the body time to adjust.

## 2023-12-14 ENCOUNTER — TELEPHONE (OUTPATIENT)
Dept: GASTROENTEROLOGY | Facility: CLINIC | Age: 68
End: 2023-12-14

## 2023-12-18 ENCOUNTER — ANESTHESIA (OUTPATIENT)
Dept: ANESTHESIOLOGY | Facility: HOSPITAL | Age: 68
End: 2023-12-18

## 2023-12-18 ENCOUNTER — ANESTHESIA EVENT (OUTPATIENT)
Dept: ANESTHESIOLOGY | Facility: HOSPITAL | Age: 68
End: 2023-12-18

## 2024-01-24 ENCOUNTER — APPOINTMENT (OUTPATIENT)
Dept: RADIOLOGY | Facility: CLINIC | Age: 69
End: 2024-01-24
Payer: COMMERCIAL

## 2024-01-24 ENCOUNTER — OFFICE VISIT (OUTPATIENT)
Dept: FAMILY MEDICINE CLINIC | Facility: CLINIC | Age: 69
End: 2024-01-24
Payer: COMMERCIAL

## 2024-01-24 VITALS
HEIGHT: 61 IN | SYSTOLIC BLOOD PRESSURE: 116 MMHG | TEMPERATURE: 98.5 F | BODY MASS INDEX: 34.32 KG/M2 | WEIGHT: 181.8 LBS | DIASTOLIC BLOOD PRESSURE: 74 MMHG | HEART RATE: 89 BPM | OXYGEN SATURATION: 98 %

## 2024-01-24 DIAGNOSIS — R05.1 ACUTE COUGH: ICD-10-CM

## 2024-01-24 DIAGNOSIS — J22 LOWER RESPIRATORY INFECTION: ICD-10-CM

## 2024-01-24 DIAGNOSIS — J22 LOWER RESPIRATORY INFECTION: Primary | ICD-10-CM

## 2024-01-24 LAB
SARS-COV-2 AG UPPER RESP QL IA: NEGATIVE
VALID CONTROL: NORMAL

## 2024-01-24 PROCEDURE — 99213 OFFICE O/P EST LOW 20 MIN: CPT

## 2024-01-24 PROCEDURE — 71046 X-RAY EXAM CHEST 2 VIEWS: CPT

## 2024-01-24 PROCEDURE — 87811 SARS-COV-2 COVID19 W/OPTIC: CPT

## 2024-01-24 RX ORDER — BENZONATATE 200 MG/1
200 CAPSULE ORAL 3 TIMES DAILY PRN
Qty: 30 CAPSULE | Refills: 0 | Status: SHIPPED | OUTPATIENT
Start: 2024-01-24

## 2024-01-24 RX ORDER — METHYLPREDNISOLONE 4 MG/1
TABLET ORAL
Qty: 21 EACH | Refills: 0 | Status: SHIPPED | OUTPATIENT
Start: 2024-01-24

## 2024-01-24 RX ORDER — ALBUTEROL SULFATE 90 UG/1
2 AEROSOL, METERED RESPIRATORY (INHALATION) EVERY 6 HOURS PRN
Qty: 6.7 G | Refills: 0 | Status: SHIPPED | OUTPATIENT
Start: 2024-01-24

## 2024-01-24 RX ORDER — DOXYCYCLINE HYCLATE 100 MG/1
100 CAPSULE ORAL EVERY 12 HOURS SCHEDULED
Qty: 20 CAPSULE | Refills: 0 | Status: SHIPPED | OUTPATIENT
Start: 2024-01-24 | End: 2024-02-03

## 2024-01-24 NOTE — PROGRESS NOTES
Name: Enma Chauhan      : 1955      MRN: [id number]  Encounter Provider: Kavya Crystal PA-C  Encounter Date: 2024   Encounter department: [facility]    Assessment & Plan     1. Lower respiratory infection  Assessment & Plan:  Patient presents today with signs, symptoms, and physical exam findings suggestive of a lower respiratory infection. Patient has had symptoms for over a week including cough, fever, chills, and chest congestion. Her mother is currently hospitalized with pneumonia.     Tested negative for COVID today in clinic.   Sent patient for CXR considering her exposure to pneumonia. Rhonchi heard on exam today. If CXR is clear for pneumonia, this is likely a bronchitis infection. Starting course of doxycycline to cover her for pneumonia and bronchitis, if CXR shows pneumonia - she will not require a change in her treatment plan. Given tessalon for the cough. Given steroid pack today to help with any lung inflammation that is contributing to her symptoms today. Given albuterol to use to help with the chest tightness she is experiencing.     Patient is to follow up with us as needed if her symptoms continue. Patient given ER precautions if shortness of breath significantly worsens, chest pains, or high fevers about 104 occur.     Orders:  -     XR chest pa & lateral; Future; Expected date: 2024  -     benzonatate (TESSALON) 200 MG capsule; Take 1 capsule (200 mg total) by mouth 3 (three) times a day as needed for cough  -     methylPREDNISolone 4 MG tablet therapy pack; Use as directed on package  -     doxycycline hyclate (VIBRAMYCIN) 100 mg capsule; Take 1 capsule (100 mg total) by mouth every 12 (twelve) hours for 10 days  -     albuterol (Ventolin HFA) 90 mcg/act inhaler; Inhale 2 puffs every 6 (six) hours as needed for wheezing    2. Acute cough  -     POCT Rapid Covid Ag           Subjective      Patient presents today with concerns of a cough, fever,  chills, and chest congestion for the past week. She states her cough is productive of phlegm, denies any hemoptysis. She states she has been having back pains and cannot lie flat. She notes her mother is currently hospitalized for pneumonia. She states her mother's roommate at the hospital did also have COVID, so she has been exposed. She has tried mucinex and tylenol at home.     Cough  This is a new problem. The current episode started in the past 7 days. The problem has been gradually worsening. The problem occurs every few minutes. The cough is Productive of sputum. Associated symptoms include chills, a fever and nasal congestion. Pertinent negatives include no chest pain, ear congestion, ear pain, headaches, postnasal drip, rhinorrhea, shortness of breath or wheezing. She has tried OTC cough suppressant for the symptoms.   Fever  This is a new problem. The current episode started in the past 7 days. The problem occurs daily. The problem has been unchanged. Associated symptoms include chills, coughing and a fever. Pertinent negatives include no chest pain or headaches. She has tried acetaminophen for the symptoms. The treatment provided moderate relief.     Review of Systems   Constitutional:  Positive for chills and fever.   HENT:  Negative for ear pain, postnasal drip and rhinorrhea.    Respiratory:  Positive for cough. Negative for shortness of breath and wheezing.    Cardiovascular:  Negative for chest pain.   Neurological:  Negative for headaches.       Current Outpatient Medications on File Prior to Visit   Medication Sig    amoxicillin-clavulanate (AUGMENTIN) 875-125 mg per tablet  (Patient not taking: Reported on 11/15/2023)    carbamide peroxide (DEBROX) 6.5 % otic solution Administer 5 drops into both ears 2 (two) times a day (Patient not taking: Reported on 11/15/2023)    methocarbamol (ROBAXIN) 500 mg tablet TAKE ONE TABLET BY MOUTH FOUR TIMES DAILY AS NEEDED for muscle spasms (Patient not taking:  "Reported on 10/18/2023)    Omega-3 Fatty Acids (OMEGA 3 PO) Take 1 capsule by mouth daily (Patient not taking: Reported on 1/24/2024)    polyethylene glycol (GLYCOLAX) 17 GM/SCOOP powder Take 17 g by mouth daily (Patient not taking: Reported on 1/24/2024)    polyethylene glycol (GOLYTELY) 4000 mL solution Take 4,000 mL by mouth once for 1 dose Per colonoscopy prep instructions. (Patient not taking: Reported on 1/24/2024)    [DISCONTINUED] albuterol (Ventolin HFA) 90 mcg/act inhaler Inhale 2 puffs every 6 (six) hours as needed for wheezing (Patient not taking: Reported on 11/15/2023)    [DISCONTINUED] benzonatate (TESSALON) 200 MG capsule  (Patient not taking: Reported on 1/24/2024)       Objective     /74 (BP Location: Left arm, Patient Position: Sitting, Cuff Size: Large)   Pulse 89   Temp 98.5 °F (36.9 °C) (Temporal)   Ht 5' 1\" (1.549 m)   Wt 82.5 kg (181 lb 12.8 oz)   LMP  (LMP Unknown)   SpO2 98%   BMI 34.35 kg/m²     Physical Exam  Vitals and nursing note reviewed.   Constitutional:       General: She is not in acute distress.     Appearance: Normal appearance. She is ill-appearing. She is not toxic-appearing.   HENT:      Head: Normocephalic and atraumatic.      Right Ear: Tympanic membrane normal. There is no impacted cerumen.      Left Ear: There is impacted cerumen.      Nose: Nose normal.      Mouth/Throat:      Mouth: Mucous membranes are moist.      Pharynx: Oropharynx is clear. No oropharyngeal exudate or posterior oropharyngeal erythema.   Cardiovascular:      Rate and Rhythm: Normal rate and regular rhythm.      Heart sounds: No murmur heard.  Pulmonary:      Effort: Pulmonary effort is normal. No respiratory distress.      Breath sounds: No stridor. Rhonchi present. No wheezing or rales.   Musculoskeletal:      Right lower leg: No edema.      Left lower leg: No edema.   Neurological:      General: No focal deficit present.      Mental Status: She is alert and oriented to person, place, " and time.   Psychiatric:         Mood and Affect: Mood normal.         Behavior: Behavior normal.         Judgment: Judgment normal.       Kavya Crystal PA-C

## 2024-01-24 NOTE — ASSESSMENT & PLAN NOTE
Patient presents today with signs, symptoms, and physical exam findings suggestive of a lower respiratory infection. Patient has had symptoms for over a week including cough, fever, chills, and chest congestion. Her mother is currently hospitalized with pneumonia.     Tested negative for COVID today in clinic.   Sent patient for CXR considering her exposure to pneumonia. Rhonchi heard on exam today. If CXR is clear for pneumonia, this is likely a bronchitis infection. Starting course of doxycycline to cover her for pneumonia and bronchitis, if CXR shows pneumonia - she will not require a change in her treatment plan. Given tessalon for the cough. Given steroid pack today to help with any lung inflammation that is contributing to her symptoms today. Given albuterol to use to help with the chest tightness she is experiencing.     Patient is to follow up with us as needed if her symptoms continue. Patient given ER precautions if shortness of breath significantly worsens, chest pains, or high fevers about 104 occur.

## 2024-01-31 ENCOUNTER — TELEPHONE (OUTPATIENT)
Dept: GASTROENTEROLOGY | Facility: CLINIC | Age: 69
End: 2024-01-31

## 2024-02-15 ENCOUNTER — TELEPHONE (OUTPATIENT)
Age: 69
End: 2024-02-15

## 2024-02-15 DIAGNOSIS — J98.8 CONGESTION OF UPPER AIRWAY: Primary | ICD-10-CM

## 2024-02-15 DIAGNOSIS — R79.9 ABNORMAL BLOOD CHEMISTRY: ICD-10-CM

## 2024-02-15 NOTE — TELEPHONE ENCOUNTER
Patient called asking for a ENT referral. She still is having congestion and breathing problems. She would also like [address]  For her cardiologist appt for Dr Lee. Please call patient back when completed.   [phone]

## 2024-02-23 ENCOUNTER — APPOINTMENT (OUTPATIENT)
Dept: LAB | Facility: CLINIC | Age: 69
End: 2024-02-23
Payer: COMMERCIAL

## 2024-02-23 DIAGNOSIS — R79.9 ABNORMAL BLOOD CHEMISTRY: ICD-10-CM

## 2024-02-23 LAB
ALBUMIN SERPL BCP-MCNC: 4.3 G/DL (ref 3.5–5)
ALP SERPL-CCNC: 82 U/L (ref 34–104)
ALT SERPL W P-5'-P-CCNC: 15 U/L (ref 7–52)
ANION GAP SERPL CALCULATED.3IONS-SCNC: 8 MMOL/L
AST SERPL W P-5'-P-CCNC: 17 U/L (ref 13–39)
BILIRUB SERPL-MCNC: 0.39 MG/DL (ref 0.2–1)
BUN SERPL-MCNC: 13 MG/DL (ref 5–25)
CALCIUM SERPL-MCNC: 9.5 MG/DL (ref 8.4–10.2)
CHLORIDE SERPL-SCNC: 106 MMOL/L (ref 96–108)
CO2 SERPL-SCNC: 27 MMOL/L (ref 21–32)
CREAT SERPL-MCNC: 0.74 MG/DL (ref 0.6–1.3)
EST. AVERAGE GLUCOSE BLD GHB EST-MCNC: 137 MG/DL
GFR SERPL CREATININE-BSD FRML MDRD: 82 ML/MIN/1.73SQ M
GLUCOSE P FAST SERPL-MCNC: 87 MG/DL (ref 65–99)
HBA1C MFR BLD: 6.4 %
POTASSIUM SERPL-SCNC: 4.2 MMOL/L (ref 3.5–5.3)
PROT SERPL-MCNC: 6.5 G/DL (ref 6.4–8.4)
SODIUM SERPL-SCNC: 141 MMOL/L (ref 135–147)

## 2024-02-23 PROCEDURE — 80053 COMPREHEN METABOLIC PANEL: CPT

## 2024-02-23 PROCEDURE — 36415 COLL VENOUS BLD VENIPUNCTURE: CPT

## 2024-02-23 PROCEDURE — 83036 HEMOGLOBIN GLYCOSYLATED A1C: CPT

## 2024-02-28 ENCOUNTER — TELEPHONE (OUTPATIENT)
Dept: FAMILY MEDICINE CLINIC | Facility: CLINIC | Age: 69
End: 2024-02-28

## 2024-02-28 NOTE — TELEPHONE ENCOUNTER
----- Message from Saul Archer DO sent at 2/28/2024  9:13 AM EST -----  Please notify patient that labs from February 23 were reviewed.  She is still prediabetic, but sugars have worsened since the last labs we have right on her 3 years ago.  Recommend schedule med check appointment in near future

## 2024-03-06 ENCOUNTER — OFFICE VISIT (OUTPATIENT)
Dept: FAMILY MEDICINE CLINIC | Facility: CLINIC | Age: 69
End: 2024-03-06
Payer: COMMERCIAL

## 2024-03-06 ENCOUNTER — TELEPHONE (OUTPATIENT)
Age: 69
End: 2024-03-06

## 2024-03-06 VITALS
SYSTOLIC BLOOD PRESSURE: 132 MMHG | OXYGEN SATURATION: 99 % | BODY MASS INDEX: 34.96 KG/M2 | WEIGHT: 185 LBS | TEMPERATURE: 98.2 F | HEART RATE: 90 BPM | DIASTOLIC BLOOD PRESSURE: 76 MMHG

## 2024-03-06 DIAGNOSIS — E78.00 HYPERCHOLESTEROLEMIA: ICD-10-CM

## 2024-03-06 DIAGNOSIS — R10.11 RIGHT UPPER QUADRANT ABDOMINAL PAIN: ICD-10-CM

## 2024-03-06 DIAGNOSIS — R31.0 GROSS HEMATURIA: ICD-10-CM

## 2024-03-06 DIAGNOSIS — J01.90 ACUTE NON-RECURRENT SINUSITIS, UNSPECIFIED LOCATION: Primary | ICD-10-CM

## 2024-03-06 DIAGNOSIS — R73.09 ABNORMAL BLOOD SUGAR: ICD-10-CM

## 2024-03-06 DIAGNOSIS — Z13.29 SCREENING FOR THYROID DISORDER: ICD-10-CM

## 2024-03-06 DIAGNOSIS — Z87.442 HISTORY OF KIDNEY STONES: ICD-10-CM

## 2024-03-06 DIAGNOSIS — E66.09 CLASS 1 OBESITY DUE TO EXCESS CALORIES WITHOUT SERIOUS COMORBIDITY WITH BODY MASS INDEX (BMI) OF 34.0 TO 34.9 IN ADULT: ICD-10-CM

## 2024-03-06 LAB
SL AMB  POCT GLUCOSE, UA: NEGATIVE
SL AMB LEUKOCYTE ESTERASE,UA: ABNORMAL
SL AMB POCT BILIRUBIN,UA: NEGATIVE
SL AMB POCT BLOOD,UA: ABNORMAL
SL AMB POCT CLARITY,UA: CLEAR
SL AMB POCT COLOR,UA: YELLOW
SL AMB POCT KETONES,UA: NEGATIVE
SL AMB POCT NITRITE,UA: NEGATIVE
SL AMB POCT PH,UA: 6.5
SL AMB POCT SPECIFIC GRAVITY,UA: 1.02
SL AMB POCT URINE PROTEIN: NEGATIVE
SL AMB POCT UROBILINOGEN: 0.2

## 2024-03-06 PROCEDURE — 87077 CULTURE AEROBIC IDENTIFY: CPT | Performed by: FAMILY MEDICINE

## 2024-03-06 PROCEDURE — 99214 OFFICE O/P EST MOD 30 MIN: CPT | Performed by: FAMILY MEDICINE

## 2024-03-06 PROCEDURE — 87186 SC STD MICRODIL/AGAR DIL: CPT | Performed by: FAMILY MEDICINE

## 2024-03-06 PROCEDURE — 87086 URINE CULTURE/COLONY COUNT: CPT | Performed by: FAMILY MEDICINE

## 2024-03-06 PROCEDURE — 81003 URINALYSIS AUTO W/O SCOPE: CPT | Performed by: FAMILY MEDICINE

## 2024-03-06 RX ORDER — METHYLPREDNISOLONE 4 MG/1
TABLET ORAL
Qty: 21 TABLET | Refills: 0 | Status: SHIPPED | OUTPATIENT
Start: 2024-03-06

## 2024-03-06 RX ORDER — AMOXICILLIN 875 MG/1
875 TABLET, COATED ORAL 2 TIMES DAILY
Qty: 20 TABLET | Refills: 0 | Status: SHIPPED | OUTPATIENT
Start: 2024-03-06 | End: 2024-03-16

## 2024-03-06 NOTE — ASSESSMENT & PLAN NOTE
Patient has noticed intermittent blood in her urine for the past few days.  This has been asymptomatic.  She does have history of kidney stones in the past.  She has never required intervention.  Urinalysis today revealed +1 blood.  Will send for CT abdomen pelvis stone study.  Drink plenty of fluids.  Will call with results

## 2024-03-06 NOTE — ASSESSMENT & PLAN NOTE
A1c from February 23 was 6.4%.  Patient with family history of diabetes.  Discussed reduced carb diet and exercise.  Will recheck labs prior to next appointment (also see obesity, will start trial of Wegovy today)

## 2024-03-06 NOTE — ASSESSMENT & PLAN NOTE
Patient complained of intermittent right upper quadrant pain.  Will check CT abdomen pelvis for hematuria.  Consider following with ultrasound right upper quadrant if needed

## 2024-03-06 NOTE — ASSESSMENT & PLAN NOTE
Labs from September 2023 revealed high cholesterol.  Patient will be consulting with cardiologist in near future

## 2024-03-06 NOTE — TELEPHONE ENCOUNTER
PA for Semaglutide-Weight Management (WEGOVY) 0.5 MG/0.5ML     Submitted via    []CMM-KEY   [x][employer]-Case ID # [id number]   []Faxed to plan   []Other website   []Phone call Case ID #     Office notes sent, clinical questions answered. Awaiting determination    Turnaround time for your insurance to make a decision on your Prior Authorization can take 7-21 business days.

## 2024-03-06 NOTE — PROGRESS NOTES
Name: Enma Chauhan      : 1955      MRN: [id number]  Encounter Provider: Saul Archer DO  Encounter Date: 3/6/2024   Encounter department: [facility]    Assessment & Plan     1. Acute non-recurrent sinusitis, unspecified location  -     amoxicillin (AMOXIL) 875 mg tablet; Take 1 tablet (875 mg total) by mouth 2 (two) times a day for 10 days  -     methylPREDNISolone 4 MG tablet therapy pack; Use as directed on package    2. Gross hematuria  Assessment & Plan:  Patient has noticed intermittent blood in her urine for the past few days.  This has been asymptomatic.  She does have history of kidney stones in the past.  She has never required intervention.  Urinalysis today revealed +1 blood.  Will send for CT abdomen pelvis stone study.  Drink plenty of fluids.  Will call with results    Orders:  -     POCT urine dip auto non-scope  -     Urine culture  -     CT renal stone study abdomen pelvis wo contrast; Future; Expected date: 2024  -     CBC and differential; Future; Expected date: 2024    3. History of kidney stones  -     CT renal stone study abdomen pelvis wo contrast; Future; Expected date: 2024    4. Right upper quadrant abdominal pain  Assessment & Plan:  Patient complained of intermittent right upper quadrant pain.  Will check CT abdomen pelvis for hematuria.  Consider following with ultrasound right upper quadrant if needed      5. Abnormal blood sugar  Assessment & Plan:  A1c from  was 6.4%.  Patient with family history of diabetes.  Discussed reduced carb diet and exercise.  Will recheck labs prior to next appointment (also see obesity, will start trial of Wegovy today)    Orders:  -     Comprehensive metabolic panel; Future; Expected date: 2024  -     Hemoglobin A1C; Future; Expected date: 2024    6. Hypercholesterolemia  Assessment & Plan:  Labs from 2023 revealed high cholesterol.  Patient will be consulting with  cardiologist in near future    Orders:  -     Lipid Panel with Direct LDL reflex; Future; Expected date: 08/01/2024    7. Class 1 obesity due to excess calories without serious comorbidity with body mass index (BMI) of 34.0 to 34.9 in adult  Assessment & Plan:  Weight 185, BMI 34.96.  Patient interested in trial of GLP-1 agonist.  Will give trial of Wegovy 0.25 mg weekly (patient given instructions today using a demonstrator pen).  Will further titrate based on response    Orders:  -     Semaglutide-Weight Management (WEGOVY) 0.5 MG/0.5ML; Inject 0.5 mL (0.5 mg total) under the skin once a week    8. Screening for thyroid disorder  -     TSH, 3rd generation with Free T4 reflex; Future; Expected date: 08/01/2024         Patient presents today to review recent labs.  She is also been having ongoing sinus symptoms for the past few weeks.  She is also noticed some blood in her urine for the last 3 days.  She does not take any prescription medication.  She is a non-smoker.  Does not drink alcohol.  Drinks approximately 1 cup of coffee per day.  Exam today reveals stable vital signs.  Normal exam.    CMP/A1c from February 23 reviewed with patient    Patient will get COVID booster near future  Patient had flu shot this season  Patient had RSV vaccine  Patient current with pneumonia vaccination  Current with tetanus  Patient Shingrix vaccination    Will call with CT results    6 months, fasting blood work prior  Subjective     Patient presents today to review recent labs.  She is also been having ongoing sinus symptoms for the past few weeks.  She is also noticed some blood in her urine for the last 3 days.  She does not take any prescription medication.  She is a non-smoker.  Does not drink alcohol.  Drinks approximately 1 cup of coffee per day.      Review of Systems   Constitutional:  Negative for chills and fever.   HENT:  Positive for congestion and postnasal drip.    Respiratory:  Positive for cough. Negative for  shortness of breath.    Cardiovascular: Negative.    Gastrointestinal: Negative.    Genitourinary: Negative.        Past Medical History:   Diagnosis Date   • Tibia/fibula fracture 2016    ORIF     Past Surgical History:   Procedure Laterality Date   •  SECTION      x3:  10/30/1977(F), 1980 (F), 1981 (M)   • HYSTERECTOMY     • LEG SURGERY Right     fracture     Family History   Problem Relation Age of Onset   • Breast cancer Mother    • Diabetes Mother    • Breast cancer Maternal Grandmother    • Diabetes Paternal Grandmother    • Cancer Family      Social History     Socioeconomic History   • Marital status: /Civil Union     Spouse name: None   • Number of children: None   • Years of education: None   • Highest education level: None   Occupational History   • None   Tobacco Use   • Smoking status: Never   • Smokeless tobacco: Never   Vaping Use   • Vaping status: Never Used   Substance and Sexual Activity   • Alcohol use: No   • Drug use: No   • Sexual activity: Yes     Partners: Male   Other Topics Concern   • None   Social History Narrative   • None     Social Determinants of Health     Financial Resource Strain: Not on file   Food Insecurity: Not on file   Transportation Needs: Not on file   Physical Activity: Not on file   Stress: Not on file   Social Connections: Not on file   Intimate Partner Violence: Not on file   Housing Stability: Not on file     Current Outpatient Medications on File Prior to Visit   Medication Sig   • albuterol (Ventolin HFA) 90 mcg/act inhaler Inhale 2 puffs every 6 (six) hours as needed for wheezing (Patient not taking: Reported on 3/6/2024)   • amoxicillin-clavulanate (AUGMENTIN) 875-125 mg per tablet  (Patient not taking: Reported on 11/15/2023)   • benzonatate (TESSALON) 200 MG capsule Take 1 capsule (200 mg total) by mouth 3 (three) times a day as needed for cough (Patient not taking: Reported on 3/6/2024)   • carbamide peroxide (DEBROX) 6.5 % otic solution  Administer 5 drops into both ears 2 (two) times a day (Patient not taking: Reported on 11/15/2023)   • methocarbamol (ROBAXIN) 500 mg tablet TAKE ONE TABLET BY MOUTH FOUR TIMES DAILY AS NEEDED for muscle spasms (Patient not taking: Reported on 10/18/2023)   • methylPREDNISolone 4 MG tablet therapy pack Use as directed on package   • Omega-3 Fatty Acids (OMEGA 3 PO) Take 1 capsule by mouth daily (Patient not taking: Reported on 1/24/2024)   • polyethylene glycol (GLYCOLAX) 17 GM/SCOOP powder Take 17 g by mouth daily (Patient not taking: Reported on 1/24/2024)   • polyethylene glycol (GOLYTELY) 4000 mL solution Take 4,000 mL by mouth once for 1 dose Per colonoscopy prep instructions. (Patient not taking: Reported on 1/24/2024)     Allergies   Allergen Reactions   • Simvastatin Other (See Comments)     MUSCLE WEAKNESS     Immunization History   Administered Date(s) Administered   • COVID-19 PFIZER VACCINE 0.3 ML IM 04/07/2021, 04/28/2021, 10/08/2021   • COVID-19 Pfizer Vac BIVALENT Kamron-sucrose 12 Yr+ IM 09/26/2022   • Hep A, ped/adol, 2 dose 01/13/2004   • INFLUENZA 09/01/2013, 10/22/2013, 10/04/2014, 10/30/2017, 10/02/2018, 10/09/2018, 10/02/2019, 09/20/2021, 10/03/2022, 08/30/2023   • Influenza Quadrivalent, 6-35 Months IM 10/01/2017   • Influenza Split High Dose Preservative Free IM 10/07/2020   • Pneumococcal Conjugate 13-Valent 11/07/2019   • Pneumococcal Conjugate Vaccine 20-valent (Pcv20), Polysace 08/30/2023   • Pneumococcal Polysaccharide PPV23 10/04/2014   • Respiratory Syncytial Virus Vaccine (Recombinant) 08/30/2023   • Td (adult), Unspecified 01/13/2005   • Tdap 09/07/2014, 08/17/2018, 09/26/2022   • Zoster Vaccine Recombinant 08/14/2021, 10/14/2021       Objective     /76   Pulse 90   Temp 98.2 °F (36.8 °C)   Wt 83.9 kg (185 lb)   LMP  (LMP Unknown)   SpO2 99%   BMI 34.96 kg/m²     Physical Exam  Cardiovascular:      Rate and Rhythm: Normal rate and regular rhythm.      Heart sounds: Normal  heart sounds.      Comments: Carotids: no bruits  Ext: no edema  Pulmonary:      Effort: Pulmonary effort is normal. No respiratory distress.      Breath sounds: No wheezing or rales.   Psychiatric:         Behavior: Behavior normal.         Thought Content: Thought content normal.       Saul Archer, DO

## 2024-03-06 NOTE — ASSESSMENT & PLAN NOTE
Weight 185, BMI 34.96.  Patient interested in trial of GLP-1 agonist.  Will give trial of Wegovy 0.25 mg weekly (patient given instructions today using a demonstrator pen).  Will further titrate based on response

## 2024-03-08 ENCOUNTER — TELEPHONE (OUTPATIENT)
Age: 69
End: 2024-03-08

## 2024-03-08 LAB
BACTERIA UR CULT: ABNORMAL
BACTERIA UR CULT: ABNORMAL

## 2024-03-08 NOTE — TELEPHONE ENCOUNTER
Called pt to advise Medication Wegovy has been approved by the insurance. Your pharmacy has been made aware of your approval, please call them to see when your medication will be available for .    [x]Spoke with pt. Verbal understanding  []LMOM   []L/M to call office back. Communication consent not updated in chart  []Other

## 2024-03-08 NOTE — TELEPHONE ENCOUNTER
Patient called the RX Refill Line. Message is being forwarded to the office.     Patient is requesting if the Provider has any idea where to find Wegovy - she is going to call around but was curious if anyone knew.     Please contact patient at

## 2024-03-08 NOTE — TELEPHONE ENCOUNTER
PA for Semaglutide-Weight Management (WEGOVY) 0.5 MG/0.5ML  Approved   Date(s) approved 2/5/24-10/2/24  Case #[id number]    Patient advised by [x] [employer] Message                      [x] Phone call       Pharmacy advised by []Fax                                     [x]Phone call    Approval letter scanned into Media No

## 2024-03-09 DIAGNOSIS — R31.9 URINARY TRACT INFECTION WITH HEMATURIA, SITE UNSPECIFIED: Primary | ICD-10-CM

## 2024-03-09 DIAGNOSIS — N39.0 URINARY TRACT INFECTION WITH HEMATURIA, SITE UNSPECIFIED: Primary | ICD-10-CM

## 2024-03-09 RX ORDER — SULFAMETHOXAZOLE AND TRIMETHOPRIM 800; 160 MG/1; MG/1
1 TABLET ORAL EVERY 12 HOURS SCHEDULED
Qty: 14 TABLET | Refills: 0 | Status: SHIPPED | OUTPATIENT
Start: 2024-03-09 | End: 2024-03-16

## 2024-03-12 ENCOUNTER — NURSE TRIAGE (OUTPATIENT)
Dept: OTHER | Facility: OTHER | Age: 69
End: 2024-03-12

## 2024-03-12 NOTE — TELEPHONE ENCOUNTER
"Regarding: fever/chills/urethra pain/abdominal pain  ----- Message from Carmelita Chen sent at 3/12/2024  6:08 PM EDT -----  Pt stated, \"I am in extreme pain. I have fever chills and excruciating urethra pain. I also have pain in the left side of my abdomin.\"    "

## 2024-03-12 NOTE — TELEPHONE ENCOUNTER
"Answer Assessment - Initial Assessment Questions  1. SYMPTOM: \"What's the main symptom you're concerned about?\" (e.g., frequency, incontinence)      Left sided abdominal pian, fevers (as high as 102), chills, urethral pain     2. ONSET: \"When did the  symptoms  start?\"      Last Saturday    3. PAIN: \"Is there any pain?\" If Yes, ask: \"How bad is it?\" (Scale: 1-10; mild, moderate, severe)      Severe: 8/10    4. CAUSE: \"What do you think is causing the symptoms?\"      UTI    5. OTHER SYMPTOMS: \"Do you have any other symptoms?\" (e.g., fever, flank pain, blood in urine, pain with urination)      Back pain, blood in urine, urinary frequency and urgency    Protocols used: Urinary Symptoms-ADULT-    "

## 2024-03-12 NOTE — TELEPHONE ENCOUNTER
"Pt is currently on an antibiotic for UTI. Advised pt to report to the ER with the symptoms she is reporting. She may need additional imaging/lab testing, etc. Things our office/urgent care cannot complete.    She asked if we could give her muscle relaxers to help her through the night. Pt advised she could take Azo if she's experiencing spasms when she urinate but muscle relaxants would not be appropriate for her complaints. Explain our concern would be her infection is worsening/spreading and she needs to be evaluated quickly.    Pt states she will take some Azo and Tylenol and if it \"gets bad\" she'll go to the ER.  "

## 2024-03-13 ENCOUNTER — OFFICE VISIT (OUTPATIENT)
Dept: CARDIOLOGY CLINIC | Facility: CLINIC | Age: 69
End: 2024-03-13
Payer: COMMERCIAL

## 2024-03-13 VITALS
DIASTOLIC BLOOD PRESSURE: 62 MMHG | HEIGHT: 61 IN | BODY MASS INDEX: 33.42 KG/M2 | WEIGHT: 177 LBS | HEART RATE: 83 BPM | SYSTOLIC BLOOD PRESSURE: 120 MMHG

## 2024-03-13 DIAGNOSIS — E78.00 HYPERCHOLESTEROLEMIA: Primary | ICD-10-CM

## 2024-03-13 DIAGNOSIS — R31.9 URINARY TRACT INFECTION WITH HEMATURIA, SITE UNSPECIFIED: Primary | ICD-10-CM

## 2024-03-13 DIAGNOSIS — N39.0 URINARY TRACT INFECTION WITH HEMATURIA, SITE UNSPECIFIED: Primary | ICD-10-CM

## 2024-03-13 DIAGNOSIS — I21.29 SEPTAL INFARCTION (HCC): ICD-10-CM

## 2024-03-13 PROCEDURE — 99204 OFFICE O/P NEW MOD 45 MIN: CPT

## 2024-03-13 PROCEDURE — 93000 ELECTROCARDIOGRAM COMPLETE: CPT

## 2024-03-13 RX ORDER — PHENAZOPYRIDINE HYDROCHLORIDE 200 MG/1
200 TABLET, FILM COATED ORAL
Qty: 10 TABLET | Refills: 0 | Status: SHIPPED | OUTPATIENT
Start: 2024-03-13

## 2024-03-13 RX ORDER — EZETIMIBE 10 MG/1
10 TABLET ORAL DAILY
Qty: 30 TABLET | Refills: 11 | Status: SHIPPED | OUTPATIENT
Start: 2024-03-13

## 2024-03-13 NOTE — PROGRESS NOTES
Outpatient Consultation - General Cardiology   Enma Chauhan 68 y.o. female   MRN: [id number]  Encounter: [id number]  PCP: Saul Archer DO       History of Present Illness   Physician Requesting Consult: Consults   Reason for Consult / Principal Problem: Hyperlipidemia    Assessment/Plan   Enma Chauhan is a 68 y.o. year old female, with PMH of hyperlipidemia with statin intolerance, pre- diabetes (A1c 6.4%) who is here to discuss management of hyperlipidemia.     # Hyperlipidemia: 9/2023 cholesterol panel: , TG 96, HDL 62, . ASCVD risk score 14.5% Patient took simvastatin briefly in 2013 with severe myalgias.  Her cholesterol has not been treated since then.  I recommended the patient try a different statin at a low dose (rosuvastatin 5mg).  She is fearful of more myalgias and would rather try other alternatives even if less effective at reducing ASCVD risk. Will start zetia 10mg and get coronary calcium score for further risk stratifications.   -zetia 10mg daily  -coronary calcium score  - based on cholesterol response and coronary score will consider another agent.     #Septal infarct pattern of EKG:   No baseline.  Will check echo.     HPI: Enma Chauhan is a 68 y.o. year old female, with PMH of hyperlipidemia with statin intolerance, diabetes (A1c 6.4%) who is here to discuss management of hyperlipidemia.     The patient has no physical complaints today.  She denies CP, shortness of breath, palpitations.      She has had high cholesterol for a while now.  In 2013 she was started on simvastatin but has severe myalgias so she stopped the medication.  She reported bilateral upper leg muscle pain.  The myalgias did improve quickly after stopping the medicine.     She has been hesitant to start any other statin medications due to those symptoms.     Recently PCP checked cholesterol:  , TG 96, HDL 62, .    Review of Systems  Review of system was conducted and was negative except  for as stated in the HPI.      Historical Information   Past Medical History:   Diagnosis Date    Tibia/fibula fracture 2016    ORIF     Past Surgical History:   Procedure Laterality Date     SECTION      x3:  10/30/1977(F), 1980 (F), 1981 (M)    HYSTERECTOMY      LEG SURGERY Right     fracture     Social History     Substance and Sexual Activity   Alcohol Use No     Social History     Substance and Sexual Activity   Drug Use No     Social History     Tobacco Use   Smoking Status Never   Smokeless Tobacco Never     Family History: father had an MI in his later 60s    Meds/Allergies   Home Medications:   Current Outpatient Medications:     albuterol (Ventolin HFA) 90 mcg/act inhaler, Inhale 2 puffs every 6 (six) hours as needed for wheezing (Patient not taking: Reported on 3/6/2024), Disp: 6.7 g, Rfl: 0    amoxicillin (AMOXIL) 875 mg tablet, Take 1 tablet (875 mg total) by mouth 2 (two) times a day for 10 days, Disp: 20 tablet, Rfl: 0    amoxicillin-clavulanate (AUGMENTIN) 875-125 mg per tablet, , Disp: , Rfl:     benzonatate (TESSALON) 200 MG capsule, Take 1 capsule (200 mg total) by mouth 3 (three) times a day as needed for cough (Patient not taking: Reported on 3/6/2024), Disp: 30 capsule, Rfl: 0    carbamide peroxide (DEBROX) 6.5 % otic solution, Administer 5 drops into both ears 2 (two) times a day (Patient not taking: Reported on 11/15/2023), Disp: 15 mL, Rfl: 0    methocarbamol (ROBAXIN) 500 mg tablet, TAKE ONE TABLET BY MOUTH FOUR TIMES DAILY AS NEEDED for muscle spasms (Patient not taking: Reported on 10/18/2023), Disp: 30 tablet, Rfl: 0    methylPREDNISolone 4 MG tablet therapy pack, Use as directed on package, Disp: 21 each, Rfl: 0    methylPREDNISolone 4 MG tablet therapy pack, Use as directed on package, Disp: 21 tablet, Rfl: 0    Omega-3 Fatty Acids (OMEGA 3 PO), Take 1 capsule by mouth daily (Patient not taking: Reported on 2024), Disp: , Rfl:     phenazopyridine (PYRIDIUM) 200 mg  "tablet, Take 1 tablet (200 mg total) by mouth 3 (three) times a day with meals, Disp: 10 tablet, Rfl: 0    polyethylene glycol (GLYCOLAX) 17 GM/SCOOP powder, Take 17 g by mouth daily (Patient not taking: Reported on 1/24/2024), Disp: 850 g, Rfl: 1    polyethylene glycol (GOLYTELY) 4000 mL solution, Take 4,000 mL by mouth once for 1 dose Per colonoscopy prep instructions. (Patient not taking: Reported on 1/24/2024), Disp: 4000 mL, Rfl: 0    Semaglutide-Weight Management (WEGOVY) 0.5 MG/0.5ML, Inject 0.5 mL (0.5 mg total) under the skin once a week, Disp: 2 mL, Rfl: 2    sulfamethoxazole-trimethoprim (BACTRIM DS) 800-160 mg per tablet, Take 1 tablet by mouth every 12 (twelve) hours for 7 days, Disp: 14 tablet, Rfl: 0    Allergies   Allergen Reactions    Simvastatin Other (See Comments)     MUSCLE WEAKNESS         Objective   Vitals: not currently breastfeeding.      Physical Exam  GEN: Enma Chauhan appears well, alert and oriented x 3, pleasant and cooperative   HEENT:  Normocephalic, atraumatic, anicteric, moist mucous membranes  NECK: No JVD or carotid bruits   HEART: regular rhythm, regular rate, normal S1 and S2, no murmurs, clicks, gallops or rubs   LUNGS: Clear to auscultation bilaterally; no wheezes, rales, or rhonchi; respiration nonlabored   ABDOMEN:  Normoactive bowel sounds, soft, no tenderness, no distention  EXTREMITIES: peripheral pulses palpable; no edema  NEURO: no gross focal findings  SKIN:  Dry, intact, warm to touch    Lab Results: I have personally reviewed pertinent lab results.    No results found for: \"HSTNI0\", \"HSTNI2\", \"HSTNI4\", \"HSTNI\"  No results found for: \"NTBNP\"  Lab Results   Component Value Date    TRIG 96 09/11/2023    HDL 62 09/11/2023    LDLCALC 179 (H) 09/11/2023     Lab Results   Component Value Date    K 4.2 02/23/2024    CO2 27 02/23/2024     02/23/2024    BUN 13 02/23/2024    CREATININE 0.74 02/23/2024    ALT 15 02/23/2024    AST 17 02/23/2024    TSH 2.46 08/23/2018 " "    Lab Results   Component Value Date    WBC 5.67 09/11/2023    HGB 13.1 09/11/2023    HCT 43.0 09/11/2023    MCV 87 09/11/2023     09/11/2023     No results found for: \"INR\"      Imaging: I have personally reviewed pertinent reports.        EKG:   Date: today in office.   NSR with septal infarct pattern.     Case discussed and reviewed with Dr. Lancaster who agrees with my assessment and plan.    Thank you for involving us in the care of your patient.      Anuel Quinones MD  Cardiology Fellow   PGY-5   "

## 2024-03-14 NOTE — TELEPHONE ENCOUNTER
Patient reports she is taking her ABT and Pyridium as ordered but is still experiencing LLQ pain, fever, and chills. She is having a CT scan on 3/17/24 and wanted to know if her PCP wanted to order any more studies as mentioned before. She reports her kidney stone is getting progressively worse.

## 2024-03-17 ENCOUNTER — HOSPITAL ENCOUNTER (OUTPATIENT)
Dept: CT IMAGING | Facility: HOSPITAL | Age: 69
Discharge: HOME | End: 2024-03-17
Payer: COMMERCIAL

## 2024-03-17 DIAGNOSIS — Z87.442 HISTORY OF KIDNEY STONES: ICD-10-CM

## 2024-03-17 DIAGNOSIS — R31.0 GROSS HEMATURIA: ICD-10-CM

## 2024-03-17 PROCEDURE — 74176 CT ABD & PELVIS W/O CONTRAST: CPT

## 2024-03-18 ENCOUNTER — TELEPHONE (OUTPATIENT)
Age: 69
End: 2024-03-18

## 2024-03-19 DIAGNOSIS — R59.0 MESENTERIC LYMPHADENOPATHY: Primary | ICD-10-CM

## 2024-03-20 ENCOUNTER — OFFICE VISIT (OUTPATIENT)
Dept: GASTROENTEROLOGY | Facility: CLINIC | Age: 69
End: 2024-03-20
Payer: COMMERCIAL

## 2024-03-20 VITALS
OXYGEN SATURATION: 99 % | DIASTOLIC BLOOD PRESSURE: 81 MMHG | BODY MASS INDEX: 35.46 KG/M2 | HEART RATE: 81 BPM | WEIGHT: 180.6 LBS | SYSTOLIC BLOOD PRESSURE: 120 MMHG | TEMPERATURE: 99 F | HEIGHT: 60 IN

## 2024-03-20 DIAGNOSIS — Z80.0 FAMILY HISTORY OF COLON CANCER: ICD-10-CM

## 2024-03-20 DIAGNOSIS — R10.84 GENERALIZED ABDOMINAL PAIN: ICD-10-CM

## 2024-03-20 DIAGNOSIS — K59.00 CONSTIPATION, UNSPECIFIED CONSTIPATION TYPE: Primary | ICD-10-CM

## 2024-03-20 PROCEDURE — 99215 OFFICE O/P EST HI 40 MIN: CPT | Performed by: INTERNAL MEDICINE

## 2024-03-20 NOTE — PROGRESS NOTES
[facility] - Outpatient Follow-up Note  Enma Chauhan 68 y.o. female MRN: [id number]  Encounter: [id number]          ASSESSMENT AND PLAN:    Enma Chauhan is a 68 y.o. female with chronic constipation and family history of colon cancer.  Recent CT with suggestion of sclerosing mesenteritis, but she does not have typical symptoms of this and the finding was incidental.  We will proceed with her screening colonoscopy.  Fiber table and instructions provided.  Aim for 25 g per day.  Increase Miralax to twice daily.  Adjust dose up or down as needed.  Agree with repeating CT a/p in several months to follow up on mesenteric finding.    1. Constipation, unspecified constipation type    2. Family history of colon cancer    3. Generalized abdominal pain        No orders of the defined types were placed in this encounter.    ______________________________________________________________________    SUBJECTIVE:    Enma Chauhan is a 68 y.o. female who presents for follow up of chronic constipation and family history of colon cancer.  Last seen by me in November when we planned to increase Miralax, increase dietary fiber, and schedule colonoscopy.    She had to reschedule the colonoscopy because of pneumonia - she has now recovered.    She did not increase Miralax and still takes 17 g once daily.  She did increase fiber intake, but only modestly and has not been tracking her daily amount of fiber.  She continues to have just 3 BMs per week and feels uncomfortable with generalized abdominal pain when she is constipated.  The discomfort is relieved by BMs.    She had a CT of abdomen and pelvis on 3/17/2024 due to concern for kidney stone.  I personally reviewed the CT, which shows subtle haziness and prominent lymph nodes in the mid-abdomen, raising the possibility of sclerosing mesenteritis.  Her abdominal pain is strictly associated with constipation and she is not losing  weight.      REVIEW OF SYSTEMS IS OTHERWISE NEGATIVE.      Historical Information   Past Medical History:   Diagnosis Date    Tibia/fibula fracture 2016    ORIF     Past Surgical History:   Procedure Laterality Date     SECTION      x3:  10/30/1977(F), 1980 (F), 1981 (M)    HYSTERECTOMY      LEG SURGERY Right     fracture     Social History   Social History     Substance and Sexual Activity   Alcohol Use No     Social History     Substance and Sexual Activity   Drug Use No     Social History     Tobacco Use   Smoking Status Never   Smokeless Tobacco Never     Family History   Problem Relation Age of Onset    Breast cancer Mother     Diabetes Mother     Breast cancer Maternal Grandmother     Diabetes Paternal Grandmother     Cancer Family        Meds/Allergies       Current Outpatient Medications:     albuterol (Ventolin HFA) 90 mcg/act inhaler    carbamide peroxide (DEBROX) 6.5 % otic solution    ezetimibe (ZETIA) 10 mg tablet    polyethylene glycol (GLYCOLAX) 17 GM/SCOOP powder    benzonatate (TESSALON) 200 MG capsule    methocarbamol (ROBAXIN) 500 mg tablet    methylPREDNISolone 4 MG tablet therapy pack    Omega-3 Fatty Acids (OMEGA 3 PO)    phenazopyridine (PYRIDIUM) 200 mg tablet    polyethylene glycol (GOLYTELY) 4000 mL solution    Semaglutide-Weight Management (WEGOVY) 0.5 MG/0.5ML    Allergies   Allergen Reactions    Simvastatin Other (See Comments)     MUSCLE WEAKNESS           Objective     Blood pressure 120/81, pulse 81, temperature 99 °F (37.2 °C), temperature source Tympanic, height 5' (1.524 m), weight 81.9 kg (180 lb 9.6 oz), SpO2 99%, not currently breastfeeding. Body mass index is 35.27 kg/m².      PHYSICAL EXAM:      General Appearance:   Alert, cooperative, no distress   HEENT:   Normocephalic, atraumatic, anicteric.     Neck:  Supple, symmetrical, trachea midline   Lungs:   Clear to auscultation bilaterally; no rales, rhonchi or wheezing; respirations unlabored    Heart::    "Regular rate and rhythm; no murmur, rub, or gallop.   Abdomen:   Soft, non-tender, non-distended; normal bowel sounds; no masses, no organomegaly    Genitalia:   Deferred    Rectal:   Deferred    Extremities:  No cyanosis, clubbing or edema    Pulses:  2+ and symmetric    Skin:  No jaundice, rashes, or lesions    Lymph nodes:  No palpable cervical lymphadenopathy        Lab Results:   No visits with results within 1 Day(s) from this visit.   Latest known visit with results is:   Office Visit on 03/06/2024   Component Date Value     COLOR,UA 03/06/2024 Yellow     CLARITY,UA 03/06/2024 Clear     SPECIFIC GRAVITY,UA 03/06/2024 1.020      PH,UA 03/06/2024 6.5     LEUKOCYTE ESTERASE,UA 03/06/2024 1+     NITRITE,UA 03/06/2024 Negative     GLUCOSE, UA 03/06/2024 Negative     KETONES,UA 03/06/2024 Negative     BILIRUBIN,UA 03/06/2024 Negative     BLOOD,UA 03/06/2024 1+     POCT URINE PROTEIN 03/06/2024 Negative     SL AMB POCT UROBILINOGEN 03/06/2024 0.2     Urine Culture 03/06/2024 >100,000 cfu/ml Klebsiella pneumoniae (A)     Urine Culture 03/06/2024 20,000-29,000 cfu/ml        Lab Results   Component Value Date    WBC 5.67 09/11/2023    HGB 13.1 09/11/2023    HCT 43.0 09/11/2023    MCV 87 09/11/2023     09/11/2023       Lab Results   Component Value Date    SODIUM 141 02/23/2024    K 4.2 02/23/2024     02/23/2024    CO2 27 02/23/2024    AGAP 8 02/23/2024    BUN 13 02/23/2024    CREATININE 0.74 02/23/2024    GLUC 49 (LL) 07/23/2020    GLUF 87 02/23/2024    CALCIUM 9.5 02/23/2024    AST 17 02/23/2024    ALT 15 02/23/2024    ALKPHOS 82 02/23/2024    TP 6.5 02/23/2024    TBILI 0.39 02/23/2024    EGFR 82 02/23/2024       No results found for: \"CRP\"    Lab Results   Component Value Date    [id number] 4.196 09/11/2023    TSH 2.46 08/23/2018       No results found for: \"IRON\", \"TIBC\", \"FERRITIN\"    Radiology Results:   CT renal stone study abdomen pelvis wo contrast    Result Date: 3/18/2024  Narrative: CT ABDOMEN " AND PELVIS WITHOUT IV CONTRAST - LOW DOSE RENAL STONE INDICATION: R31.0: Gross hematuria Z87.442: Personal history of urinary calculi. Left flank pain COMPARISON: Kidney/bladder ultrasound dated April 20, 2022 TECHNIQUE: Low radiation dose thin section CT examination of the abdomen and pelvis was performed without intravenous or oral contrast according to a protocol specifically designed to evaluate for urinary tract calculus. Axial, sagittal, and coronal 2D reformatted images were created from the source data and submitted for interpretation. Evaluation for pathology in the abdomen and pelvis that is unrelated to urinary tract calculi is limited. Radiation dose length product (DLP) for this visit: 542 mGy-cm . This examination, like all CT scans performed in the [facility], was performed utilizing techniques to minimize radiation dose exposure, including the use of iterative reconstruction and automated exposure control. URINARY TRACT FINDINGS: RIGHT KIDNEY AND URETER: No urinary tract calculi. No hydronephrosis or hydroureter. LEFT KIDNEY AND URETER: No urinary tract calculi. No hydronephrosis or hydroureter. URINARY BLADDER: Unremarkable. ADDITIONAL FINDINGS: LOWER CHEST: On image 1 of series 2 there is a 2 mm right lower lobe lung nodule. On image 6 of series 2 there is a 2 mm right lower lobe lung nodule. SOLID VISCERA: Hepatic dome cysts.  Limited low radiation dose noncontrast CT evaluation otherwise demonstrate no clinically significant abnormality of the imaged portions of the liver, spleen, pancreas, or adrenal glands. GALLBLADDER/BILIARY TREE: Mild nonspecific layering hyperdense material within the gallbladder without definitive calcified cholelithiasis, pericholecystic inflammatory changes, or biliary dilatation. STOMACH AND BOWEL: Nonspecific fluid within the colon which could reflect sequela of gastroenteritis. No evidence for bowel obstruction. APPENDIX: No findings to suggest  appendicitis. ABDOMINOPELVIC CAVITY: Mild haziness to the central mesentery with mildly prominent but nonpathologic in size mesenteric lymph nodes with relative sparing of the perivascular fat suggestive of sclerosing mesenteritis.  No pneumoperitoneum or ascites. VESSELS: Atherosclerosis without abdominal aortic aneurysm. REPRODUCTIVE ORGANS: Post hysterectomy. ABDOMINAL WALL/INGUINAL REGIONS: Unremarkable. BONES: Degenerative changes are noted involving the spine.     Impression: 1. No renal calculi or evidence for obstructive uropathy. The etiology of the patient's clinical symptomatology is not identified on unenhanced CT. 2. Nonspecific fluid within the colon which could reflect sequela of gastroenteritis. 3. Mild haziness to the central abdominal mesentery with mildly prominent but nonpathologic in size mesenteric lymph nodes suggestive of sclerosing mesenteritis. Consider short interval follow-up CT in 3 months to ensure stability and exclude developing lymphadenopathy. 4. 2 mm right lung nodules. Based on current Fleischner Society 2017 Guidelines on incidental pulmonary nodule, optional follow-up CT at 12 months can be considered. Please see above for details and additional findings. The study was marked in EPIC for significant notification. The study was marked in Epic for follow-up. Workstation performed: [id number]

## 2024-03-20 NOTE — H&P (VIEW-ONLY)
[facility] - Outpatient Follow-up Note  Enma Chauhan 68 y.o. female MRN: [id number]  Encounter: [id number]          ASSESSMENT AND PLAN:    Enma Chauhan is a 68 y.o. female with chronic constipation and family history of colon cancer.  Recent CT with suggestion of sclerosing mesenteritis, but she does not have typical symptoms of this and the finding was incidental.  We will proceed with her screening colonoscopy.  Fiber table and instructions provided.  Aim for 25 g per day.  Increase Miralax to twice daily.  Adjust dose up or down as needed.  Agree with repeating CT a/p in several months to follow up on mesenteric finding.    1. Constipation, unspecified constipation type    2. Family history of colon cancer    3. Generalized abdominal pain        No orders of the defined types were placed in this encounter.    ______________________________________________________________________    SUBJECTIVE:    Enma Chauhan is a 68 y.o. female who presents for follow up of chronic constipation and family history of colon cancer.  Last seen by me in November when we planned to increase Miralax, increase dietary fiber, and schedule colonoscopy.    She had to reschedule the colonoscopy because of pneumonia - she has now recovered.    She did not increase Miralax and still takes 17 g once daily.  She did increase fiber intake, but only modestly and has not been tracking her daily amount of fiber.  She continues to have just 3 BMs per week and feels uncomfortable with generalized abdominal pain when she is constipated.  The discomfort is relieved by BMs.    She had a CT of abdomen and pelvis on 3/17/2024 due to concern for kidney stone.  I personally reviewed the CT, which shows subtle haziness and prominent lymph nodes in the mid-abdomen, raising the possibility of sclerosing mesenteritis.  Her abdominal pain is strictly associated with constipation and she is not losing  weight.      REVIEW OF SYSTEMS IS OTHERWISE NEGATIVE.      Historical Information   Past Medical History:   Diagnosis Date    Tibia/fibula fracture 2016    ORIF     Past Surgical History:   Procedure Laterality Date     SECTION      x3:  10/30/1977(F), 1980 (F), 1981 (M)    HYSTERECTOMY      LEG SURGERY Right     fracture     Social History   Social History     Substance and Sexual Activity   Alcohol Use No     Social History     Substance and Sexual Activity   Drug Use No     Social History     Tobacco Use   Smoking Status Never   Smokeless Tobacco Never     Family History   Problem Relation Age of Onset    Breast cancer Mother     Diabetes Mother     Breast cancer Maternal Grandmother     Diabetes Paternal Grandmother     Cancer Family        Meds/Allergies       Current Outpatient Medications:     albuterol (Ventolin HFA) 90 mcg/act inhaler    carbamide peroxide (DEBROX) 6.5 % otic solution    ezetimibe (ZETIA) 10 mg tablet    polyethylene glycol (GLYCOLAX) 17 GM/SCOOP powder    benzonatate (TESSALON) 200 MG capsule    methocarbamol (ROBAXIN) 500 mg tablet    methylPREDNISolone 4 MG tablet therapy pack    Omega-3 Fatty Acids (OMEGA 3 PO)    phenazopyridine (PYRIDIUM) 200 mg tablet    polyethylene glycol (GOLYTELY) 4000 mL solution    Semaglutide-Weight Management (WEGOVY) 0.5 MG/0.5ML    Allergies   Allergen Reactions    Simvastatin Other (See Comments)     MUSCLE WEAKNESS           Objective     Blood pressure 120/81, pulse 81, temperature 99 °F (37.2 °C), temperature source Tympanic, height 5' (1.524 m), weight 81.9 kg (180 lb 9.6 oz), SpO2 99%, not currently breastfeeding. Body mass index is 35.27 kg/m².      PHYSICAL EXAM:      General Appearance:   Alert, cooperative, no distress   HEENT:   Normocephalic, atraumatic, anicteric.     Neck:  Supple, symmetrical, trachea midline   Lungs:   Clear to auscultation bilaterally; no rales, rhonchi or wheezing; respirations unlabored    Heart::    "Regular rate and rhythm; no murmur, rub, or gallop.   Abdomen:   Soft, non-tender, non-distended; normal bowel sounds; no masses, no organomegaly    Genitalia:   Deferred    Rectal:   Deferred    Extremities:  No cyanosis, clubbing or edema    Pulses:  2+ and symmetric    Skin:  No jaundice, rashes, or lesions    Lymph nodes:  No palpable cervical lymphadenopathy        Lab Results:   No visits with results within 1 Day(s) from this visit.   Latest known visit with results is:   Office Visit on 03/06/2024   Component Date Value     COLOR,UA 03/06/2024 Yellow     CLARITY,UA 03/06/2024 Clear     SPECIFIC GRAVITY,UA 03/06/2024 1.020      PH,UA 03/06/2024 6.5     LEUKOCYTE ESTERASE,UA 03/06/2024 1+     NITRITE,UA 03/06/2024 Negative     GLUCOSE, UA 03/06/2024 Negative     KETONES,UA 03/06/2024 Negative     BILIRUBIN,UA 03/06/2024 Negative     BLOOD,UA 03/06/2024 1+     POCT URINE PROTEIN 03/06/2024 Negative     SL AMB POCT UROBILINOGEN 03/06/2024 0.2     Urine Culture 03/06/2024 >100,000 cfu/ml Klebsiella pneumoniae (A)     Urine Culture 03/06/2024 20,000-29,000 cfu/ml        Lab Results   Component Value Date    WBC 5.67 09/11/2023    HGB 13.1 09/11/2023    HCT 43.0 09/11/2023    MCV 87 09/11/2023     09/11/2023       Lab Results   Component Value Date    SODIUM 141 02/23/2024    K 4.2 02/23/2024     02/23/2024    CO2 27 02/23/2024    AGAP 8 02/23/2024    BUN 13 02/23/2024    CREATININE 0.74 02/23/2024    GLUC 49 (LL) 07/23/2020    GLUF 87 02/23/2024    CALCIUM 9.5 02/23/2024    AST 17 02/23/2024    ALT 15 02/23/2024    ALKPHOS 82 02/23/2024    TP 6.5 02/23/2024    TBILI 0.39 02/23/2024    EGFR 82 02/23/2024       No results found for: \"CRP\"    Lab Results   Component Value Date    XZZ2VBSAHKOV 4.196 09/11/2023    TSH 2.46 08/23/2018       No results found for: \"IRON\", \"TIBC\", \"FERRITIN\"    Radiology Results:   CT renal stone study abdomen pelvis wo contrast    Result Date: 3/18/2024  Narrative: CT ABDOMEN " AND PELVIS WITHOUT IV CONTRAST - LOW DOSE RENAL STONE INDICATION: R31.0: Gross hematuria Z87.442: Personal history of urinary calculi. Left flank pain COMPARISON: Kidney/bladder ultrasound dated April 20, 2022 TECHNIQUE: Low radiation dose thin section CT examination of the abdomen and pelvis was performed without intravenous or oral contrast according to a protocol specifically designed to evaluate for urinary tract calculus. Axial, sagittal, and coronal 2D reformatted images were created from the source data and submitted for interpretation. Evaluation for pathology in the abdomen and pelvis that is unrelated to urinary tract calculi is limited. Radiation dose length product (DLP) for this visit: 542 mGy-cm . This examination, like all CT scans performed in the [facility], was performed utilizing techniques to minimize radiation dose exposure, including the use of iterative reconstruction and automated exposure control. URINARY TRACT FINDINGS: RIGHT KIDNEY AND URETER: No urinary tract calculi. No hydronephrosis or hydroureter. LEFT KIDNEY AND URETER: No urinary tract calculi. No hydronephrosis or hydroureter. URINARY BLADDER: Unremarkable. ADDITIONAL FINDINGS: LOWER CHEST: On image 1 of series 2 there is a 2 mm right lower lobe lung nodule. On image 6 of series 2 there is a 2 mm right lower lobe lung nodule. SOLID VISCERA: Hepatic dome cysts.  Limited low radiation dose noncontrast CT evaluation otherwise demonstrate no clinically significant abnormality of the imaged portions of the liver, spleen, pancreas, or adrenal glands. GALLBLADDER/BILIARY TREE: Mild nonspecific layering hyperdense material within the gallbladder without definitive calcified cholelithiasis, pericholecystic inflammatory changes, or biliary dilatation. STOMACH AND BOWEL: Nonspecific fluid within the colon which could reflect sequela of gastroenteritis. No evidence for bowel obstruction. APPENDIX: No findings to suggest  appendicitis. ABDOMINOPELVIC CAVITY: Mild haziness to the central mesentery with mildly prominent but nonpathologic in size mesenteric lymph nodes with relative sparing of the perivascular fat suggestive of sclerosing mesenteritis.  No pneumoperitoneum or ascites. VESSELS: Atherosclerosis without abdominal aortic aneurysm. REPRODUCTIVE ORGANS: Post hysterectomy. ABDOMINAL WALL/INGUINAL REGIONS: Unremarkable. BONES: Degenerative changes are noted involving the spine.     Impression: 1. No renal calculi or evidence for obstructive uropathy. The etiology of the patient's clinical symptomatology is not identified on unenhanced CT. 2. Nonspecific fluid within the colon which could reflect sequela of gastroenteritis. 3. Mild haziness to the central abdominal mesentery with mildly prominent but nonpathologic in size mesenteric lymph nodes suggestive of sclerosing mesenteritis. Consider short interval follow-up CT in 3 months to ensure stability and exclude developing lymphadenopathy. 4. 2 mm right lung nodules. Based on current Fleischner Society 2017 Guidelines on incidental pulmonary nodule, optional follow-up CT at 12 months can be considered. Please see above for details and additional findings. The study was marked in EPIC for significant notification. The study was marked in Epic for follow-up. Workstation performed: [id number]

## 2024-03-20 NOTE — PATIENT INSTRUCTIONS
High Fiber Diet   WHAT YOU NEED TO KNOW:   A high-fiber diet includes foods that have a high amount of fiber. Fiber is the part of fruits, vegetables, and grains that is not broken down by your body. Fiber keeps your bowel movements regular. Fiber can also help lower your cholesterol level, control blood sugar in people with diabetes, and relieve constipation. Fiber can also help you control your weight because it helps you feel full faster. Most adults should eat 25 to 35 grams of fiber each day. Talk to your dietitian or healthcare provider about the amount of fiber you need.  DISCHARGE INSTRUCTIONS:   Good sources of fiber:       Foods with at least 4 grams of fiber per serving:      ? to ½ cup of high-fiber cereal (check the nutrition label on the box)    ½ cup of blackberries or raspberries    4 dried prunes    1 cooked artichoke    ½ cup of cooked legumes, such as lentils, or red, kidney, and lares beans    Foods with 1 to 3 grams of fiber per servin slice of whole-wheat, pumpernickel, or rye bread    ½ cup of cooked brown rice    4 whole-wheat crackers    1 cup of oatmeal    ½ cup of cereal with 1 to 3 grams of fiber per serving (check the nutrition label on the box)    1 small piece of fruit, such as an apple, banana, pear, kiwi, or orange    3 dates    ½ cup of canned apricots, fruit cocktail, peaches, or pears    ½ cup of raw or cooked vegetables, such as carrots, cauliflower, cabbage, spinach, squash, or corn  Ways that you can increase fiber in your diet:   Choose brown or wild rice instead of white rice.     Use whole wheat flour in recipes instead of white or all-purpose flour.     Add beans and peas to casseroles or soups.     Choose fresh fruit and vegetables with peels or skins on instead of juices.    Other diet guidelines to follow:   Add fiber to your diet slowly.  You may have abdominal discomfort, bloating, and gas if you add fiber to your diet too quickly.     Drink plenty of liquids  as you add fiber to your diet.  You may have nausea or develop constipation if you do not drink enough water. Ask how much liquid to drink each day and which liquids are best for you.    © Copyright [employer] 2023 Information is for End User's use only and may not be sold, redistributed or otherwise used for commercial purposes.  The above information is an  only. It is not intended as medical advice for individual conditions or treatments. Talk to your doctor, nurse or pharmacist before following any medical regimen to see if it is safe and effective for you.    Enma Chauhan  3/20/2024     Recommended Total Fiber Intake**    AGE  MEN  WOMAN    19-50  38 grams/day  25 grams/day    Over 50  30 grams/day  21 grams/day    Fiber Sources in Common Foods   Use this guide to find out if you have enough fiber in you diet.    Food  Size of Serving  Fiber Grams/Servings  Calories/   Serving  Food  Size of Serving  Fiber Grams/Servings  Calories/   Serving    Fruits: (raw unless otherwise noted  Vegetables: (cooked, unless otherwise noted)    Apple (w/peel)  1 medium  3.7  81  Artichoke  1 globe  6.5  60    Apricots  1 cup  3.7  74  Asparagus  ½ cup  1.8  25    Banana  1 medium  2.7  105  Beans:    Blackberries  1 cup  7.2  75  Green (canned)  ½ cup  1.3  14    Blueberries  1 cup  3.9  81  Kidney  ½ cup  5.7  114    Cantaloupe  1 cup  1.3  56  Lima  ½ cup  6.1  85    Grapefruit  1 medium  2.8  82  Walker  ½ cup  7.4  118    Grapes  1 cup  1.6  114  White  ½ cup  5.5  122    Orange  1 medium  3.1  62  Beets  ½ cup  1.6  37    Pear (with peel)  1 medium  4.0  98  Broccoli  ½ cup  2.8  26    Pineapple  1 cup  1.9  76  Cabbage, green  ½ cup  2.1  16    Plums  1 medium  1.0  36  Cabbage, green (raw)  ½ cup  0.8  9    Prunes (dried)  1 cup  11.4  386  Carrots  ½ cup  2.6  35    Raspberries  1 cup  8.4  60  Cauliflower  ½ cup  2.0  17    Strawberries  1 cup  3.4  45  Cauliflower (raw)  ½ cup  1.3  13    Watermelon   1 slice  0.8  51  Celery (raw)  ½ cup  1.0  10    GRAIN PRODUCTS AND OTHERS:  Corn  ½ cup  2.0  66    Bread:  Cucumber (raw)  ½ cup  0.4  7    [place]ralt[place]  1 slice  0.8  68  Eggplant  ½ cup  1.2  13    Rye  1 slice  1.6  67  Green Peas  ½ cup  4.4  62    White  1 slice  0.6  67  Lettuce, iceberg (raw)  ½ cup  0.4  4    Whole Wheat  1 slice  2.0  70  Onions (raw)  ½ cup  1.4  30    Cereal:  Potato (baked with skin)  ½ cup  1.5  66    Bran  1 ounce  9.7  70  Spinach  ½ cup  2.7  25    Corn Flakes  1 ounce  1.0  110  Tomato  ½ cup  1.0  19    Oat Bran  1 ounce  4.3  69  Zucchini  ½ cup  1.3  14    Oatmeal  1 ounce  3.0  109  METAMUCIL:    Shredded Wheat  1 ounce  2.8  102  Capsules  6 capsules  3.0  10    Crackers:  Smooth Texture Orange (sugar free)  1 tsp  3.0  20    Tutu  1 square  0.1  27  Smooth Texture Orange (with sugar)  1 tbsp  3.0  45    Saltine  1 regular  0.1  13  Wafers  2 wafers  3.0  120    Rice:    Brown  ½ cup  1.8  108    White  ½ cup  0.3  103    Spaghetti  2 ounces  2.1  225    Almonds (roasted)  ½ cup  6.4  351    Peanuts (roasted)  ½ cup  6.1  388      ** [place] of [employer], The National Academy of Sciences, 2002   Track your fiber intake for five days. Use the Fiber Source Guide to find out how much fiber is in common food.     If you’re not getting your recommended amount of fiber each day, talk to your doctor about how you can increase the fiber in your diet. Example  Monday Tuesday Wednesday Thursday Friday    Food  Oatmeal    Fiber Grams  2.8    Food  Blueberries    Fiber Grams  3.9    Food  W.W. Bread    Fiber Grams  1.9    Food  W.W. Bread    Fiber Grams  1.9    Food  Apple    Fiber Grams  3.7    Food  Spaghetti    Fiber Grams  .14    Food  Corn    Fiber Grams  2.0    Food  White Bread    Fiber Grams  .6    Food    Fiber Grams    Food    Fiber Grams    Food    Fiber Grams    Food    Fiber Grams    Food    Fiber Grams    Food    Fiber Grams    Food    Fiber Grams    Food    Fiber Grams     Food    Fiber Grams    Food    Fiber Grams    Food    Fiber Grams    Food    Fiber Grams    Add numbers in each column to find your daily fiber intake.    Total Daily Fiber Intake  18.2      Too Low - Like most Americans, this example is not enough fiber. Talk to your doctor about how to add fiber to your diet.   Quick Fiber Facts    Most Americans consume only about half of the recommended fiber they need each day.    Fiber helps maintain normal bowel function, and helps prevent constipation and its potential complications. Straining and pressure from constipation may lead to diverticular disease and hemorrhoids.    Stool softeners or stimulant laxatives only offer short-term relief of constipation, while dietary changes or fiber therapies help break the cycle of irregularity.    Diets low in saturated fat and cholesterol that include 7 grams of soluble fiber per day from psyllium husk, as in Metamucil, may reduce the risk of heart disease by lowering cholesterol. One adult dose of Metamucil has 2.4 grams of this soluable fiber.    Increase fiber intake gradually, giving the body time to adjust.

## 2024-03-26 ENCOUNTER — HOSPITAL ENCOUNTER (OUTPATIENT)
Dept: MAMMOGRAPHY | Facility: MEDICAL CENTER | Age: 69
Discharge: HOME | End: 2024-03-26
Payer: COMMERCIAL

## 2024-03-26 ENCOUNTER — HOSPITAL ENCOUNTER (OUTPATIENT)
Dept: BONE DENSITY | Facility: MEDICAL CENTER | Age: 69
Discharge: HOME | End: 2024-03-26
Payer: COMMERCIAL

## 2024-03-26 VITALS — WEIGHT: 180.56 LBS | HEIGHT: 60 IN | BODY MASS INDEX: 35.45 KG/M2

## 2024-03-26 DIAGNOSIS — Z78.0 POSTMENOPAUSAL: ICD-10-CM

## 2024-03-26 DIAGNOSIS — Z13.820 SCREENING FOR OSTEOPOROSIS: ICD-10-CM

## 2024-03-26 DIAGNOSIS — Z12.31 ENCOUNTER FOR SCREENING MAMMOGRAM FOR MALIGNANT NEOPLASM OF BREAST: ICD-10-CM

## 2024-03-26 PROCEDURE — 77080 DXA BONE DENSITY AXIAL: CPT

## 2024-03-26 PROCEDURE — 77067 SCR MAMMO BI INCL CAD: CPT

## 2024-03-26 PROCEDURE — 77063 BREAST TOMOSYNTHESIS BI: CPT

## 2024-03-29 ENCOUNTER — ANESTHESIA EVENT (OUTPATIENT)
Dept: ANESTHESIOLOGY | Facility: HOSPITAL | Age: 69
End: 2024-03-29

## 2024-03-29 ENCOUNTER — ANESTHESIA (OUTPATIENT)
Dept: ANESTHESIOLOGY | Facility: HOSPITAL | Age: 69
End: 2024-03-29

## 2024-04-08 ENCOUNTER — TELEPHONE (OUTPATIENT)
Dept: GASTROENTEROLOGY | Facility: CLINIC | Age: 69
End: 2024-04-08

## 2024-04-11 RX ORDER — SODIUM CHLORIDE 9 MG/ML
125 INJECTION, SOLUTION INTRAVENOUS CONTINUOUS
Status: CANCELLED | OUTPATIENT
Start: 2024-04-11

## 2024-04-15 ENCOUNTER — ANESTHESIA (OUTPATIENT)
Dept: GASTROENTEROLOGY | Facility: MEDICAL CENTER | Age: 69
End: 2024-04-15

## 2024-04-15 ENCOUNTER — ANESTHESIA EVENT (OUTPATIENT)
Dept: GASTROENTEROLOGY | Facility: MEDICAL CENTER | Age: 69
End: 2024-04-15

## 2024-04-15 ENCOUNTER — HOSPITAL ENCOUNTER (OUTPATIENT)
Dept: GASTROENTEROLOGY | Facility: MEDICAL CENTER | Age: 69
Setting detail: OUTPATIENT SURGERY
Discharge: HOME | End: 2024-04-15
Payer: COMMERCIAL

## 2024-04-15 VITALS
WEIGHT: 180 LBS | BODY MASS INDEX: 35.34 KG/M2 | HEART RATE: 79 BPM | SYSTOLIC BLOOD PRESSURE: 145 MMHG | DIASTOLIC BLOOD PRESSURE: 65 MMHG | OXYGEN SATURATION: 100 % | RESPIRATION RATE: 16 BRPM | HEIGHT: 60 IN | TEMPERATURE: 97.1 F

## 2024-04-15 DIAGNOSIS — K63.5 POLYP OF COLON, UNSPECIFIED PART OF COLON, UNSPECIFIED TYPE: ICD-10-CM

## 2024-04-15 PROCEDURE — 88305 TISSUE EXAM BY PATHOLOGIST: CPT | Performed by: PATHOLOGY

## 2024-04-15 RX ORDER — SODIUM CHLORIDE 9 MG/ML
125 INJECTION, SOLUTION INTRAVENOUS CONTINUOUS
Status: DISCONTINUED | OUTPATIENT
Start: 2024-04-15 | End: 2024-04-19 | Stop reason: HOSPADM

## 2024-04-15 RX ORDER — LIDOCAINE HYDROCHLORIDE 20 MG/ML
INJECTION, SOLUTION EPIDURAL; INFILTRATION; INTRACAUDAL; PERINEURAL AS NEEDED
Status: DISCONTINUED | OUTPATIENT
Start: 2024-04-15 | End: 2024-04-15

## 2024-04-15 RX ORDER — PROPOFOL 10 MG/ML
INJECTION, EMULSION INTRAVENOUS CONTINUOUS PRN
Status: DISCONTINUED | OUTPATIENT
Start: 2024-04-15 | End: 2024-04-15

## 2024-04-15 RX ORDER — PROPOFOL 10 MG/ML
INJECTION, EMULSION INTRAVENOUS AS NEEDED
Status: DISCONTINUED | OUTPATIENT
Start: 2024-04-15 | End: 2024-04-15

## 2024-04-15 RX ADMIN — PROPOFOL 150 MCG/KG/MIN: 10 INJECTION, EMULSION INTRAVENOUS at 11:02

## 2024-04-15 RX ADMIN — PROPOFOL 150 MG: 10 INJECTION, EMULSION INTRAVENOUS at 11:01

## 2024-04-15 RX ADMIN — SODIUM CHLORIDE 125 ML/HR: 0.9 INJECTION, SOLUTION INTRAVENOUS at 10:54

## 2024-04-15 RX ADMIN — LIDOCAINE HYDROCHLORIDE 100 MG: 20 INJECTION, SOLUTION EPIDURAL; INFILTRATION; INTRACAUDAL at 11:02

## 2024-04-15 NOTE — ANESTHESIA POSTPROCEDURE EVALUATION
Post-Op Assessment Note    CV Status:  Stable    Pain management: adequate       Mental Status:  Alert and awake   Hydration Status:  Euvolemic   PONV Controlled:  Controlled   Airway Patency:  Patent     Post Op Vitals Reviewed: Yes    No anethesia notable event occurred.    Staff: Anesthesiologist               /63 (04/15/24 1122)    Temp      Pulse 93 (04/15/24 1122)   Resp 18 (04/15/24 1122)    SpO2 98 % (04/15/24 1122)       [facility]  History and Physical     Abdiasnimesh Valerie Patient Status:  Emergency    1980 MRN [id number]   Location [address] [facility] Attending Francesca Marie MD   Hosp Day # 0 PCP Ronnell Carolina MD     Chief Complaint: Kenia Massey MD at [address]  2014    cervical spine discectomy       Social History:  reports that she quit smoking about 18 years ago. She has a 5.00 pack-year smoking history.  She quit smokeless tobacco use about 2 edema or cyanosis. Integument: No rashes or lesions.    Psychiatric: Tearful, anxious      Diagnostic Data:      Labs:  Recent Labs   Lab 06/06/20  2300   WBC 16.2*   HGB 11.6*   MCV 90.3   .0       Recent Labs   Lab 06/06/20  2300   *   BUN

## 2024-04-15 NOTE — ANESTHESIA PREPROCEDURE EVALUATION
Procedure:  COLONOSCOPY    Relevant Problems   CARDIO   (+) Hypercholesterolemia      MUSCULOSKELETAL   (+) Acute bilateral low back pain without sciatica   (+) Osteoarthritis      Other   (+) Mesenteric lymphadenopathy        Physical Exam    Airway    Mallampati score: I  TM Distance: >3 FB  Neck ROM: full     Dental   No notable dental hx     Cardiovascular  Cardiovascular exam normal    Pulmonary  Pulmonary exam normal     Other Findings  post-pubertal.      Anesthesia Plan  ASA Score- 2     Anesthesia Type- IV sedation with anesthesia with ASA Monitors.         Additional Monitors:     Airway Plan:            Plan Factors-Exercise tolerance (METS): >4 METS.    Chart reviewed.   Existing labs reviewed. Patient summary reviewed.    Patient is not a current smoker.              Induction- intravenous.    Postoperative Plan-     Informed Consent- Anesthetic plan and risks discussed with patient.

## 2024-04-17 ENCOUNTER — HOSPITAL ENCOUNTER (OUTPATIENT)
Dept: NON INVASIVE DIAGNOSTICS | Facility: HOSPITAL | Age: 69
Discharge: HOME | End: 2024-04-17
Payer: COMMERCIAL

## 2024-04-17 ENCOUNTER — HOSPITAL ENCOUNTER (OUTPATIENT)
Dept: CT IMAGING | Facility: HOSPITAL | Age: 69
Discharge: HOME | End: 2024-04-17
Payer: COMMERCIAL

## 2024-04-17 ENCOUNTER — OFFICE VISIT (OUTPATIENT)
Dept: OBGYN CLINIC | Facility: MEDICAL CENTER | Age: 69
End: 2024-04-17
Payer: COMMERCIAL

## 2024-04-17 VITALS
SYSTOLIC BLOOD PRESSURE: 145 MMHG | BODY MASS INDEX: 35.34 KG/M2 | HEIGHT: 60 IN | DIASTOLIC BLOOD PRESSURE: 65 MMHG | HEART RATE: 60 BPM | WEIGHT: 180 LBS

## 2024-04-17 VITALS
SYSTOLIC BLOOD PRESSURE: 130 MMHG | DIASTOLIC BLOOD PRESSURE: 68 MMHG | BODY MASS INDEX: 33.44 KG/M2 | WEIGHT: 181.7 LBS | HEIGHT: 62 IN

## 2024-04-17 DIAGNOSIS — I21.29 SEPTAL INFARCTION (HCC): ICD-10-CM

## 2024-04-17 DIAGNOSIS — E78.00 HYPERCHOLESTEROLEMIA: ICD-10-CM

## 2024-04-17 DIAGNOSIS — B37.9 YEAST DETECTED: Primary | ICD-10-CM

## 2024-04-17 DIAGNOSIS — N95.2 VAGINAL ATROPHY: ICD-10-CM

## 2024-04-17 LAB
AORTIC ROOT: 2.7 CM
AORTIC VALVE MEAN VELOCITY: 9.5 M/S
APICAL FOUR CHAMBER EJECTION FRACTION: 65 %
AV AREA BY CONTINUOUS VTI: 1.8 CM2
AV AREA PEAK VELOCITY: 1.8 CM2
AV LVOT MEAN GRADIENT: 3 MMHG
AV LVOT PEAK GRADIENT: 5 MMHG
AV MEAN GRADIENT: 4 MMHG
AV PEAK GRADIENT: 8 MMHG
AV VALVE AREA: 1.83 CM2
AV VELOCITY RATIO: 0.77
BSA FOR ECHO PROCEDURE: 1.78 M2
DOP CALC AO PEAK VEL: 1.37 M/S
DOP CALC AO VTI: 29.54 CM
DOP CALC LVOT AREA: 2.27 CM2
DOP CALC LVOT CARDIAC INDEX: 2.11 L/MIN/M2
DOP CALC LVOT CARDIAC OUTPUT: 3.75 L/MIN
DOP CALC LVOT DIAMETER: 1.7 CM
DOP CALC LVOT PEAK VEL VTI: 23.81 CM
DOP CALC LVOT PEAK VEL: 1.06 M/S
DOP CALC LVOT STROKE INDEX: 29.8 ML/M2
DOP CALC LVOT STROKE VOLUME: 54.02
E WAVE DECELERATION TIME: 156 MS
E/A RATIO: 0.86
FRACTIONAL SHORTENING: 31 (ref 28–44)
INTERVENTRICULAR SEPTUM IN DIASTOLE (PARASTERNAL SHORT AXIS VIEW): 1.1 CM
INTERVENTRICULAR SEPTUM: 1.1 CM (ref 0.6–1.1)
LAAS-AP2: 15.2 CM2
LAAS-AP4: 13.5 CM2
LEFT ATRIUM AREA SYSTOLE SINGLE PLANE A4C: 12.9 CM2
LEFT ATRIUM SIZE: 2.9 CM
LEFT ATRIUM VOLUME (MOD BIPLANE): 38 ML
LEFT ATRIUM VOLUME INDEX (MOD BIPLANE): 21.3 ML/M2
LEFT INTERNAL DIMENSION IN SYSTOLE: 2.4 CM (ref 2.1–4)
LEFT VENTRICULAR INTERNAL DIMENSION IN DIASTOLE: 3.5 CM (ref 3.5–6)
LEFT VENTRICULAR POSTERIOR WALL IN END DIASTOLE: 1.1 CM
LEFT VENTRICULAR STROKE VOLUME: 32 ML
LVSV (TEICH): 32 ML
MV E'TISSUE VEL-LAT: 10 CM/S
MV E'TISSUE VEL-SEP: 7 CM/S
MV PEAK A VEL: 0.97 M/S
MV PEAK E VEL: 83 CM/S
MV STENOSIS PRESSURE HALF TIME: 45 MS
MV VALVE AREA P 1/2 METHOD: 4.89
RIGHT ATRIUM AREA SYSTOLE A4C: 10.1 CM2
RIGHT VENTRICLE ID DIMENSION: 2.9 CM
SL CV LEFT ATRIUM LENGTH A2C: 4.5 CM
SL CV PED ECHO LEFT VENTRICLE DIASTOLIC VOLUME (MOD BIPLANE) 2D: 52 ML
SL CV PED ECHO LEFT VENTRICLE SYSTOLIC VOLUME (MOD BIPLANE) 2D: 19 ML
TR MAX PG: 25 MMHG
TR PEAK VELOCITY: 2.5 M/S
TRICUSPID ANNULAR PLANE SYSTOLIC EXCURSION: 1.8 CM
TRICUSPID VALVE PEAK REGURGITATION VELOCITY: 2.51 M/S

## 2024-04-17 PROCEDURE — 87086 URINE CULTURE/COLONY COUNT: CPT | Performed by: OBSTETRICS & GYNECOLOGY

## 2024-04-17 PROCEDURE — 75571 CT HRT W/O DYE W/CA TEST: CPT

## 2024-04-17 PROCEDURE — 99203 OFFICE O/P NEW LOW 30 MIN: CPT | Performed by: OBSTETRICS & GYNECOLOGY

## 2024-04-17 PROCEDURE — 93306 TTE W/DOPPLER COMPLETE: CPT | Performed by: INTERNAL MEDICINE

## 2024-04-17 PROCEDURE — 93306 TTE W/DOPPLER COMPLETE: CPT

## 2024-04-17 RX ORDER — FLUCONAZOLE 150 MG/1
150 TABLET ORAL DAILY
Qty: 2 TABLET | Refills: 0 | Status: SHIPPED | OUTPATIENT
Start: 2024-04-17 | End: 2024-04-19

## 2024-04-17 RX ORDER — ESTRADIOL 0.1 MG/G
1 CREAM VAGINAL DAILY
Qty: 42.5 G | Refills: 3 | Status: SHIPPED | OUTPATIENT
Start: 2024-04-17

## 2024-04-17 NOTE — PROGRESS NOTES
A/P   Vaginal infection    Previous treated for UTI - New culture sent today    After the treatment she started to feel very uncomfortable    With burring and white discharge and itching      Diflucan given - for yeast infection       2.  Atrophic vaginitis    Painful intercourse since she is so dry    Start the estrace cream x 1 week then 2 x week there after.       Physical Exam  Abdominal:      Hernia: There is no hernia in the left inguinal area.   Genitourinary:     Labia:         Right: No rash, tenderness, lesion or injury.         Left: No rash, tenderness, lesion or injury.       Vagina: Vaginal discharge present.      Cervix: Discharge present. No cervical motion tenderness.      Uterus: Normal.       Adnexa:         Right: No mass, tenderness or fullness.          Left: No mass, tenderness or fullness.        Rectum: No external hemorrhoid.   Lymphadenopathy:      Lower Body: No right inguinal adenopathy. No left inguinal adenopathy.

## 2024-04-18 LAB — BACTERIA UR CULT: NORMAL

## 2024-04-18 PROCEDURE — 88305 TISSUE EXAM BY PATHOLOGIST: CPT | Performed by: PATHOLOGY

## 2024-04-25 ENCOUNTER — TELEPHONE (OUTPATIENT)
Dept: GASTROENTEROLOGY | Facility: CLINIC | Age: 69
End: 2024-04-25

## 2024-04-25 NOTE — TELEPHONE ENCOUNTER
Spoke with the pt relayed the provider messages regarding the results and pt verbalizes understanding.

## 2024-05-09 ENCOUNTER — HOSPITAL ENCOUNTER (OUTPATIENT)
Dept: MAMMOGRAPHY | Facility: CLINIC | Age: 69
Discharge: HOME | End: 2024-05-09
Payer: COMMERCIAL

## 2024-05-09 VITALS — HEIGHT: 62 IN | BODY MASS INDEX: 33.31 KG/M2 | WEIGHT: 181 LBS

## 2024-05-09 DIAGNOSIS — E66.09 CLASS 1 OBESITY DUE TO EXCESS CALORIES WITHOUT SERIOUS COMORBIDITY WITH BODY MASS INDEX (BMI) OF 34.0 TO 34.9 IN ADULT: ICD-10-CM

## 2024-05-09 DIAGNOSIS — R92.8 ABNORMAL MAMMOGRAM: ICD-10-CM

## 2024-05-09 PROCEDURE — 77065 DX MAMMO INCL CAD UNI: CPT

## 2024-05-09 NOTE — PROGRESS NOTES
Met with patient and Dr. Key  regarding recommendation for;    _____ RIGHT __x____LEFT      _____Ultrasound guided  ___x___Stereotactic breast biopsy.      __X___Verbalized understanding.      Blood thinners:  No: __x___ Yes: ______ What:                 Biopsy teaching sheet given:  Yes: ___X___ No: ________    Pt given contact information and adv to call with any questions/needs    Patient advised to arrive at 1330 for a 1400 appointment

## 2024-05-09 NOTE — TELEPHONE ENCOUNTER
Caller: Tiffany     Reason for call: Patient is calling stating that her medication needs prior authorization. Medication is attached.     Call back#: [phone]

## 2024-05-13 ENCOUNTER — TELEPHONE (OUTPATIENT)
Dept: NON INVASIVE DIAGNOSTICS | Facility: HOSPITAL | Age: 69
End: 2024-05-13

## 2024-05-13 NOTE — TELEPHONE ENCOUNTER
Called patient and discussed echocardiogram and calcium score (14).  Patient is tolerating Zetia well.     Anuel Quinones

## 2024-05-28 ENCOUNTER — HOSPITAL ENCOUNTER (OUTPATIENT)
Dept: MAMMOGRAPHY | Facility: CLINIC | Age: 69
Discharge: HOME | End: 2024-05-28
Payer: COMMERCIAL

## 2024-05-28 ENCOUNTER — HOSPITAL ENCOUNTER (OUTPATIENT)
Dept: MAMMOGRAPHY | Facility: CLINIC | Age: 69
Discharge: HOME | End: 2024-05-28

## 2024-05-28 VITALS — DIASTOLIC BLOOD PRESSURE: 83 MMHG | HEART RATE: 59 BPM | SYSTOLIC BLOOD PRESSURE: 136 MMHG

## 2024-05-28 DIAGNOSIS — R92.8 ABNORMAL FINDING ON BREAST IMAGING: ICD-10-CM

## 2024-05-28 PROCEDURE — 19081 BX BREAST 1ST LESION STRTCTC: CPT

## 2024-05-28 PROCEDURE — 88305 TISSUE EXAM BY PATHOLOGIST: CPT | Performed by: STUDENT IN AN ORGANIZED HEALTH CARE EDUCATION/TRAINING PROGRAM

## 2024-05-28 PROCEDURE — 88341 IMHCHEM/IMCYTCHM EA ADD ANTB: CPT | Performed by: STUDENT IN AN ORGANIZED HEALTH CARE EDUCATION/TRAINING PROGRAM

## 2024-05-28 PROCEDURE — 88342 IMHCHEM/IMCYTCHM 1ST ANTB: CPT | Performed by: STUDENT IN AN ORGANIZED HEALTH CARE EDUCATION/TRAINING PROGRAM

## 2024-05-28 PROCEDURE — A4648 IMPLANTABLE TISSUE MARKER: HCPCS

## 2024-05-28 RX ORDER — LIDOCAINE HYDROCHLORIDE AND EPINEPHRINE BITARTRATE 20; .01 MG/ML; MG/ML
10 INJECTION, SOLUTION SUBCUTANEOUS ONCE
Status: COMPLETED | OUTPATIENT
Start: 2024-05-28 | End: 2024-05-28

## 2024-05-28 RX ORDER — LIDOCAINE HYDROCHLORIDE 10 MG/ML
5 INJECTION, SOLUTION EPIDURAL; INFILTRATION; INTRACAUDAL; PERINEURAL ONCE
Status: COMPLETED | OUTPATIENT
Start: 2024-05-28 | End: 2024-05-28

## 2024-05-28 RX ADMIN — LIDOCAINE HYDROCHLORIDE AND EPINEPHRINE 10 ML: 20; 10 INJECTION, SOLUTION INFILTRATION; PERINEURAL at 14:51

## 2024-05-28 RX ADMIN — LIDOCAINE HYDROCHLORIDE 5 ML: 10 INJECTION, SOLUTION EPIDURAL; INFILTRATION; INTRACAUDAL; PERINEURAL at 14:51

## 2024-05-28 NOTE — PROGRESS NOTES
Procedure type:    _____ultrasound guided __x___stereotactic    Breast:    ___x__Left _____Right    Location: 12 o'clock    Needle: 8g    # of passes: 12 cores total (4 cores with calcs 8 cores without calcs)    Clip: Triple twist     Performed by: Dr. Roman     Pressure held for 5 minutes by: Kayla Moore    Sterfaraz Strips:    ___X__yes _____no    Band aid: (Pressure dressing applied with 4x4 guaze and paper tape)     __X___yes_____no    Tolerated procedure:    __X___yes _____no

## 2024-05-29 NOTE — PROGRESS NOTES
Post procedure call completed    Bleeding: _____yes __X___no    Pain: _____yes ___X___no    Redness/Swelling: ______yes ___X___no    Band aid removed: _____yes ___X__no (discussed removing when she showers)    Steri-Strips intact: ___X___yes _____no (discussed with patient to remove steri strips on 6/2/2024 if they have not come off on their own)    Pt with no questions at this time, adv will call when results available, adv to call with any questions or concerns, has name/# for contact

## 2024-05-31 ENCOUNTER — TELEPHONE (OUTPATIENT)
Dept: MAMMOGRAPHY | Facility: CLINIC | Age: 69
End: 2024-05-31

## 2024-05-31 PROCEDURE — 88341 IMHCHEM/IMCYTCHM EA ADD ANTB: CPT | Performed by: STUDENT IN AN ORGANIZED HEALTH CARE EDUCATION/TRAINING PROGRAM

## 2024-05-31 PROCEDURE — 88305 TISSUE EXAM BY PATHOLOGIST: CPT | Performed by: STUDENT IN AN ORGANIZED HEALTH CARE EDUCATION/TRAINING PROGRAM

## 2024-05-31 PROCEDURE — 88342 IMHCHEM/IMCYTCHM 1ST ANTB: CPT | Performed by: STUDENT IN AN ORGANIZED HEALTH CARE EDUCATION/TRAINING PROGRAM

## 2024-05-31 NOTE — TELEPHONE ENCOUNTER
[facility]    Diagnosis:atypical lobular hyperplasia     Is this diagnosis cancer (Y/N):no  (Nurses: If yes, this should be sent to [facility])    Biopsy Date: 5/28/2024    Does the patient have another biopsy pending:  If so, when:    Preferred provider: Dr. Melendez    Preferred location:    Any requests for dates/times:     Any additional information:     Please advise when appointment is made. Thank you

## 2024-06-03 ENCOUNTER — TELEPHONE (OUTPATIENT)
Age: 69
End: 2024-06-03

## 2024-06-03 ENCOUNTER — OFFICE VISIT (OUTPATIENT)
Dept: GASTROENTEROLOGY | Facility: CLINIC | Age: 69
End: 2024-06-03
Payer: COMMERCIAL

## 2024-06-03 ENCOUNTER — TELEPHONE (OUTPATIENT)
Dept: MAMMOGRAPHY | Facility: CLINIC | Age: 69
End: 2024-06-03

## 2024-06-03 VITALS
WEIGHT: 178.4 LBS | HEART RATE: 65 BPM | DIASTOLIC BLOOD PRESSURE: 72 MMHG | BODY MASS INDEX: 32.83 KG/M2 | SYSTOLIC BLOOD PRESSURE: 130 MMHG | TEMPERATURE: 98 F | HEIGHT: 62 IN | OXYGEN SATURATION: 99 %

## 2024-06-03 DIAGNOSIS — K59.00 CONSTIPATION, UNSPECIFIED CONSTIPATION TYPE: Primary | ICD-10-CM

## 2024-06-03 PROCEDURE — 99213 OFFICE O/P EST LOW 20 MIN: CPT | Performed by: INTERNAL MEDICINE

## 2024-06-03 RX ORDER — KETOCONAZOLE 20 MG/ML
SHAMPOO TOPICAL
COMMUNITY
Start: 2024-05-09

## 2024-06-03 NOTE — TELEPHONE ENCOUNTER
Patient was given breast biopsy results and recommendation.  The patient was referred to breast surgeon Dr. Melendez and has an appointment on 8/7/2024.

## 2024-06-03 NOTE — TELEPHONE ENCOUNTER
Reason for call:   [x] Refill   [] Prior Auth  [x] Other: pt asking for a refill on wegovy as well as PA to be started. Looks like the PA was denied please advise if an appeal will be started or if a med change is better.     Office:   [x] PCP/Provider - Liaw   [] Specialty/Provider -     Medication: Wegovy     Dose/Frequency: 0.5mg - inject weekly     Quantity: 2ml     Pharmacy: [facility] [place]     Does the patient have enough for 3 days?   [] Yes   [x] No - Send as HP to POD

## 2024-06-03 NOTE — PROGRESS NOTES
[facility] - Outpatient Follow-up Note  Enma Chauhan 68 y.o. female MRN: [id number]  Encounter: [id number]          ASSESSMENT AND PLAN:      Chronic constipation  Sclerosing mesenteritis    She has been trying high-fiber diet along with MiraLAX daily. Has BM thrice a week, with straining. Initially BM is hard but then turns into soft. No alarm signs. No signs of fever, chills, weight loss, nausea or vomiting. No abdominal pain.    Will repeat CT abdomen to follow-up on sclerosing mesenteritis, currently her symptoms do not correlate with the imaging finding.  This likely is just an incidental finding which we will follow-up with imaging. Is scheduled on 17th June.     Continue high fiber diet. Continue adequate water intake. Literature was provided last time. She does feel that she has some work to do on diet management and is motivated to work on it.     Continue miralax daily. Add dulcolax daily as stool softners due to hard Bms.     Follow up TSH as ordered by PCP.     Surveillance colonoscopy in 5 years.     There are no diagnoses linked to this encounter.  ______________________________________________________________________    SUBJECTIVE:      Patient presents today for follow-up.  Patient was last seen in GI clinic in March 2024.    68-year-old female with history including but not limited to prior hysterectomy, family history of colon cancer who presents today to discuss her chronic GI complaints.    Last seen in GI clinic for chronic constipation, abdominal pain.  Screening colonoscopy was ordered and performed in April 2024  which showed subcentimeter cecal polyp.  Follow-up colonoscopy was recommended in 5 years.  She had a CT renal study done in March 2024 which showed no significant abnormality from GI perspective except mild gastroenteritis, suspected sclerosing mesenteritis.  She was then started on high-fiber diet, MiraLAX twice a day And was recommended repeat CT  abdomen to follow-up on mesenteritis.     On today's encounter she reports ongoing issues with constipation. Takes good fibers, miralax once a day, moves bowel 3 times a week, bristol scale varies, starts at 1 and then goes to 5-6. Is unsure of exactly     REVIEW OF SYSTEMS IS OTHERWISE NEGATIVE.      Historical Information   Past Medical History:   Diagnosis Date   • Hyperlipidemia    • Tibia/fibula fracture 2016    ORIF     Past Surgical History:   Procedure Laterality Date   • BREAST CYST ASPIRATION Left     2019   •  SECTION      x3:  10/30/1977(F), 1980 (F), 1981 (M)   • COLONOSCOPY     • HYSTERECTOMY     • LEG SURGERY Right     fracture   • MAMMO STEREOTACTIC BREAST BIOPSY LEFT (ALL INC) Left 2024     Social History   Social History     Substance and Sexual Activity   Alcohol Use No     Social History     Substance and Sexual Activity   Drug Use No     Social History     Tobacco Use   Smoking Status Never   Smokeless Tobacco Never     Family History   Problem Relation Age of Onset   • Breast cancer Mother 40   • Diabetes Mother    • No Known Problems Father    • No Known Problems Daughter    • No Known Problems Daughter    • Breast cancer Maternal Grandmother 50   • No Known Problems Maternal Grandfather    • Diabetes Paternal Grandmother    • No Known Problems Paternal Grandfather    • No Known Problems Paternal Aunt    • No Known Problems Paternal Aunt    • No Known Problems Paternal Aunt    • Cancer Family    • Colon cancer Neg Hx    • Endometrial cancer Neg Hx    • Ovarian cancer Neg Hx        Meds/Allergies       Current Outpatient Medications:   •  estradiol (ESTRACE VAGINAL) 0.1 mg/g vaginal cream  •  ezetimibe (ZETIA) 10 mg tablet  •  ketoconazole (NIZORAL) 2 % shampoo  •  Omega-3 Fatty Acids (OMEGA 3 PO)  •  polyethylene glycol (GLYCOLAX) 17 GM/SCOOP powder    Allergies   Allergen Reactions   • Simvastatin Other (See Comments)     MUSCLE WEAKNESS           Objective     Blood  "pressure 130/72, pulse 65, temperature 98 °F (36.7 °C), temperature source Tympanic, height 5' 2\" (1.575 m), weight 80.9 kg (178 lb 6.4 oz), SpO2 99%, not currently breastfeeding. Body mass index is 32.63 kg/m².      PHYSICAL EXAM:      General Appearance:   Alert, cooperative, no distress   HEENT:   Normocephalic    Respi:   No labored breathing, able to speak in full sentences   Heart::   Normal rate on pulse palpation   Abdomen:   Non tender   Genitalia:   Deferred    Rectal:   Deferred    Extremities:  No cyanosis, clubbing or edema    Pulses:  2+ and symmetric    Skin:  No jaundice   Lymph nodes:  No palpable cervical lymphadenopathy        Lab Results:   No visits with results within 1 Day(s) from this visit.   Latest known visit with results is:   Hospital Outpatient Visit on 05/28/2024   Component Date Value   • Case Report 05/28/2024                      Value:Surgical Pathology Report                         Case: [id number]                                  Authorizing Provider:  BERNARD Austin    Collected:           05/28/2024 1517              Ordering Location:     [facility] Received:            05/28/2024 2114                                     Center                                                                       Pathologist:           Walker Tolentino MD                                                         Specimens:   A) - Breast, Left, Left stereo bx @ 12:00 4 cores WITH calcs                                        B) - Breast, Left, Left stereo bx @ 12:00 8 cores w/ NO calcs                             • Final Diagnosis 05/28/2024                      Value:A. Breast, Left, Left stereo bx @ 12:00 4 cores WITH calcs:      - Benign breast parenchyma with fibrocystic changes including microcysts, adenosis, usual ductal hyperplasia, and stromal fibrosis      - Microcalcification's present in benign epithelium      - Negative for atypia or carcinoma    B. Breast, " "Left, Left stereo bx @ 12:00 8 cores w/ NO calcs:      - Fibrocystic changes with microcyst's, usual ductal hyperplasia, columnar cell change, see note      - Incidental intraductal papilloma (1 mm)       - Atypical lobular hyperplasia      - Microcalcification's present in benign epithelium       - Negative for invasive carcinoma       • Note 05/28/2024                      Value:On part B, immunohistochemical stains for AE1/AE3, p63, SMMHC, E-Cadherin, p120, and CK5/6 are used to support the diagnosis.     Intradepartmental consultation is in agreement (WT).      • Additional Information 05/28/2024                      Value:Interpretation performed at [facility]. [address] [address]    All reported additional testing was performed with appropriately reactive controls.  These tests were developed and their performance characteristics determined by [facility] or appropriate performing facility, though some tests may be performed on tissues which have not been validated for performance characteristics (such as staining performed on alcohol exposed cell blocks and decalcified tissues).  Results should be interpreted with caution and in the context of the patients’ clinical condition. These tests may not be cleared or approved by the U.S. Food and Drug Administration, though the FDA has determined that such clearance or approval is not necessary. These tests are used for clinical purposes and they should not be regarded as investigational or for research. This laboratory has been approved by CLIA 88, designated as a high-complexity laboratory and is qualified to perform                           these tests.  .     • Gross Description 05/28/2024                      Value:A. The specimen is received in formalin, labeled with the patient's name and hospital number, and is designated \" left stereo biopsy at 12:00 4 cores with calcs\".  The specimen consists of 4 fibromembranous, " "friable, hemorrhagic cores measuring less than 0.1-0.6 cm in diameter and 1.1-2.7 cm in length.  Entirely submitted. Two cassettes.  Between sponges  B. The specimen is received in formalin, labeled with the patient's name and hospital number, and is designated \" left stereo biopsy at 12:00, 8 cores with no calcs\".  The specimen consists of 12 fibromembranous, friable cores measuring less than 0.1-0.5 cm in diameter and 0.8-3.5 cm in length.  Entirely submitted. Six cassettes.  Between sponges    The cold ischemia time based upon information provided by the submitting clinician and receiving staff in the laboratory is within 0 minutes.    Note: The estimated total formalin fixation time based upon information provided by the submitting clinician and the standard processing schedule                           is under 8.00 hours.      Carlos Alberto       • Clinical Information 05/28/2024                      Value:Left stereo bx @ 12:00 for indeterminate calcifications. 12 cores collected. 4 cores WITH calcs. 8 cores w/ NO calcs. 8G 9 cm needle.    FINDINGS:   LEFT  1) CALCIFICATIONS [B]: Images of the left breast calcifications at 12 o'clock in the middle portion were obtained. The patient was placed upright on the biopsy table. A core needle biopsy was performed under tomosynthesis guidance using a superior approach. The skin was prepped in the usual fashion. Anesthesia was administered using 5 mL of lidocaine 1% and 20 mL of lidocaine 2% with epinephrine. An 8 G device was advanced.  6 samples were initially obtained.  Post specimen radiography demonstrated only 1 coarse appearing calcification within 1 core sample.  The needle was retargeted and 6 additional samples were taken in a circumferential pattern.  Post specimen radiography demonstrated a few fine calcifications in 3 out of 6 core samples.     A triple twist clip was inserted into the target area. The clip was at the site. The                           patient " tolerated the procedure well. There were no immediate complications.      SPECIMEN: Radiography of the core specimens reveal calcification within several core samples. The specimens were sent to Pathology for evaluation.     IMPRESSION:   Successful stereotactic biopsy of the 12:00 left breast calcifications, with triple twist clip placement.           Radiology Results:   Mammo stereotactic breast biopsy left (all inc), Mammo post biopsy left    Addendum Date: 5/31/2024 Addendum:   ADDENDUM: PATHOLOGY RESULTS: 1) Calcifications [B] (Left; 12 o'clock; Middle) The pathology results indicate a High Risk finding with benign breast parenchyma, fibrocystic change, microcyst, adenosis, and ductal hyperplasia, usual.  In addition, intraductal papilloma and atypical lobular hyperplasia also present.  (ATTENTION: this report displays only the first five pathology codes automatically. To view the rest of the codes, see the pathology result in Chart Review).  Radiology and pathology are concordant. Surgical consultation recommended for this high risk finding of ALH. RECOMMENDATION:      - Surgical Consultation for the left breast. END ADDENDUM    Result Date: 5/31/2024  Narrative: DIAGNOSIS: Abnormal finding on breast imaging INDICATION: Enma Chauhan is a 68 y.o. female presenting for stereotactic biopsy of indeterminant 12:00 left breast calcifications.. Prior to the procedure, previous imaging was reviewed.  The procedure was explained to the patient in detail.  All questions were answered.  Written and verbal informed consent was obtained.  FINDINGS: LEFT 1) CALCIFICATIONS [B]: Images of the left breast calcifications at 12 o'clock in the middle portion were obtained. The patient was placed upright on the biopsy table. A core needle biopsy was performed under tomosynthesis guidance using a superior approach. The skin was prepped in the usual fashion. Anesthesia was administered using 5 mL of lidocaine 1% and 20 mL of  lidocaine 2% with epinephrine. An 8 G device was advanced.  6 samples were initially obtained.  Post specimen radiography demonstrated only 1 coarse appearing calcification within 1 core sample.  The needle was retargeted and 6 additional samples were taken in a circumferential pattern.  Post specimen radiography demonstrated a few fine calcifications in 3 out of 6 core samples. A triple twist clip was inserted into the target area. The clip was at the site. The patient tolerated the procedure well. There were no immediate complications. SPECIMEN: Radiography of the core specimens reveal calcification within several core samples. The specimens were sent to Pathology for evaluation.     Impression:  Successful stereotactic biopsy of the 12:00 left breast calcifications, with triple twist clip placement. RECOMMENDATION:      - Waiting for pathology for the left breast. Workstation ID: [id number]      Mammo diagnostic left w cad    Result Date: 5/9/2024  Narrative: DIAGNOSIS: Abnormal mammogram TECHNIQUE: Digital diagnostic mammography was performed. Computer Aided Detection (CAD) analyzed all applicable images. COMPARISONS: Prior breast imaging dated: 03/26/2024, 09/30/2020, 09/26/2019, 09/19/2019, 07/17/2018, 11/08/2016, and 10/30/2015 RELEVANT HISTORY: Family Breast Cancer History: History of breast cancer in Mother, Maternal Grandmother. Family Medical History: Family medical history includes breast cancer in 2 relatives (maternal grandmother, mother). Personal History: Hormone history includes birth control and estrogen replacement therapy. Surgical history includes breast cyst aspiration and hysterectomy. No known relevant medical history. RISK ASSESSMENT: 5 Year Tyrer-Cuzick: 4.39% 10 Year Tyrer-Cuzick: 9.03% Lifetime Tyrer-Cuzick: 15.02% TISSUE DENSITY: The breasts are heterogeneously dense, which may obscure small masses. INDICATION: Enma Chauhan is a 68 y.o. female presenting for abnormal mammogram.  FINDINGS: LEFT 1) CALCIFICATIONS [A]: There are coarse or 'popcorn-like' calcifications in a grouped distribution seen in the upper outer quadrant of the left breast at 2 o'clock.  These are similar in morphology to stable grouped calcifications in the posterior outer central breast most consistent with degenerating fibroadenoma.  No further surveillance required. 2) CALCIFICATIONS [B]: There are amorphous and fine pleomorphic calcifications in a linear distribution seen in the left breast at 12 o'clock in the middle depth.  Stereotactic core biopsy recommended to exclude DCIS. The above findings were discussed with the patient and the nurse navigator at the time of the study.     Impression:  Stereotactic core biopsy recommended for indeterminate linear calcifications 12:00 left breast. Several degenerating fibroadenomas as described above. ASSESSMENT/BI-RADS CATEGORY: Left: 4 - Suspicious Overall: 4 - Suspicious RECOMMENDATION:      - Stereotactic breast biopsy for the left breast. Workstation ID: [id number]       Zeb Landin MD  Fellow  Gastroenterology  [facility]

## 2024-06-04 NOTE — TELEPHONE ENCOUNTER
Jose Tate, if [employer] was denied, will they cover Zepbound?  If not, please notify patient of insurance refusal

## 2024-06-06 DIAGNOSIS — E66.09 CLASS 1 OBESITY DUE TO EXCESS CALORIES WITHOUT SERIOUS COMORBIDITY WITH BODY MASS INDEX (BMI) OF 32.0 TO 32.9 IN ADULT: Primary | ICD-10-CM

## 2024-06-06 RX ORDER — TIRZEPATIDE 2.5 MG/.5ML
2.5 INJECTION, SOLUTION SUBCUTANEOUS WEEKLY
Qty: 2 ML | Refills: 1 | Status: SHIPPED | OUTPATIENT
Start: 2024-06-06 | End: 2024-08-01

## 2024-06-07 ENCOUNTER — TELEPHONE (OUTPATIENT)
Age: 69
End: 2024-06-07

## 2024-06-07 NOTE — TELEPHONE ENCOUNTER
PA for Zepbound 2.5mg    Submitted via    []CMM-KEY   [x][employer]-Case ID # [id number]   []Faxed to plan   []Other website  []Phone call Case ID #     Office notes sent, clinical questions answered. Awaiting determination    Turnaround time for your insurance to make a decision on your Prior Authorization can take 7-21 business days.

## 2024-06-10 NOTE — TELEPHONE ENCOUNTER
PA for ZEPBOUND Approved     Date(s) approved May 8, 2024 to February 2, 2025     Case # Case ID: [id number]        Patient advised by          [x] MyChart Message  [] Phone call   []LMOM  []L/M to call office as no active Communication consent on file  []Unable to leave detailed message as VM not approved on Communication consent       Pharmacy advised by    [x]Fax  []Phone call    Approval letter scanned into Media No       Spontaneous, unlabored and symmetrical

## 2024-06-18 ENCOUNTER — TELEPHONE (OUTPATIENT)
Dept: FAMILY MEDICINE CLINIC | Facility: CLINIC | Age: 69
End: 2024-06-18

## 2024-06-18 DIAGNOSIS — R73.09 ABNORMAL BLOOD SUGAR: ICD-10-CM

## 2024-06-18 DIAGNOSIS — R59.0 MESENTERIC LYMPHADENOPATHY: Primary | ICD-10-CM

## 2024-06-19 ENCOUNTER — HOSPITAL ENCOUNTER (OUTPATIENT)
Dept: CT IMAGING | Facility: HOSPITAL | Age: 69
Discharge: HOME | End: 2024-06-19
Payer: COMMERCIAL

## 2024-06-19 DIAGNOSIS — R59.0 MESENTERIC LYMPHADENOPATHY: ICD-10-CM

## 2024-06-19 PROCEDURE — 74177 CT ABD & PELVIS W/CONTRAST: CPT

## 2024-06-19 RX ADMIN — IOHEXOL 100 ML: 350 INJECTION, SOLUTION INTRAVENOUS at 13:30

## 2024-07-12 ENCOUNTER — TELEPHONE (OUTPATIENT)
Dept: SURGICAL ONCOLOGY | Facility: CLINIC | Age: 69
End: 2024-07-12

## 2024-07-12 NOTE — TELEPHONE ENCOUNTER
"Called the patient to offer her a sooner consult with Dr. Melendez on 7/24 due to a cancellation. The patient stated that she has a \"prior engagement\" that day and she preferred to stay as scheduled on 8/07. Office address was verified with the patient.  "

## 2024-07-19 ENCOUNTER — TELEPHONE (OUTPATIENT)
Dept: HEMATOLOGY ONCOLOGY | Facility: CLINIC | Age: 69
End: 2024-07-19

## 2024-07-19 NOTE — TELEPHONE ENCOUNTER
Patient is scheduled for Surgical Oncology consult with Dr. Melendez on 8-7-2024 . I called patient and offered her 7- @ 1:00pm with Dr. Campbell in our [place] office. Patient was agreeable to new appointment offered. She had no questions or concerns at this time. I encouraged her to call should any arise.

## 2024-07-23 ENCOUNTER — TELEPHONE (OUTPATIENT)
Age: 69
End: 2024-07-23

## 2024-07-23 ENCOUNTER — OFFICE VISIT (OUTPATIENT)
Age: 69
End: 2024-07-23
Payer: COMMERCIAL

## 2024-07-23 VITALS
SYSTOLIC BLOOD PRESSURE: 114 MMHG | BODY MASS INDEX: 31.47 KG/M2 | HEART RATE: 74 BPM | HEIGHT: 62 IN | OXYGEN SATURATION: 99 % | DIASTOLIC BLOOD PRESSURE: 72 MMHG | TEMPERATURE: 98.3 F | WEIGHT: 171 LBS

## 2024-07-23 DIAGNOSIS — N60.92 ATYPICAL LOBULAR HYPERPLASIA (ALH) OF LEFT BREAST: Primary | ICD-10-CM

## 2024-07-23 DIAGNOSIS — Z80.9 FAMILY HISTORY OF CANCER: ICD-10-CM

## 2024-07-23 PROCEDURE — 99205 OFFICE O/P NEW HI 60 MIN: CPT | Performed by: SURGERY

## 2024-08-03 PROBLEM — Z80.9 FAMILY HISTORY OF CANCER: Status: ACTIVE | Noted: 2024-08-03

## 2024-08-03 PROBLEM — N60.92 ATYPICAL LOBULAR HYPERPLASIA (ALH) OF LEFT BREAST: Status: ACTIVE | Noted: 2024-08-03

## 2024-08-03 NOTE — PROGRESS NOTES
Consult - Surgical Oncology  Enma Chauhan 68 y.o. female MRN: [id number]  Encounter: [id number]    Assessment & Plan     68F with family history of premenopausal breast cancer, now with newly diagnosed left breast atypical lobular hyperplasia in the setting of evolving calcifications seen on screening mammogram. She is asymptomatic. We discussed the significance of breast atypia and the various treatment and screening recommendations associated with it. She understands that ALH is a lower risk atypia than ADH but that all breast atypia has a small but not zero chance of being associated with a malignancy.     The classic teaching is to proceed with a surgical excisional biopsy of the site to rule out malignancy, although we often proceed with close surveillance in certain cases which can be effective as well. The patient would like to avoid surgery unless there is evolution of the site in the future. If she needs a surgical excision, a jennifer reflector will need to be placed.    We discussed that ever having had breast atypia increases her lifetime risk of breast cancer in either breast regardless of any surgical excision of the atypia site. She will require high risk breast cancer screening with alternating MRI and mammogram every 6 months with clinical visits. She will be due for her first MRI in 9/2024, we will order and schedule. We will also refer her to genetics in light of her maternal history. We will call her when the results are available for the MRI and her genetics and determine next steps.    We discussed the role of chemoprevention in a high risk breast cancer situation in order to reduce her lifetime risk of breast cancer. With her family history and her personal history of atypia, this can be considered and would require a consult with medical oncology. For now, she would like to follow closely and reevaluate our plan after we see her MRI and her genetic testing. This is a reasonable plan and I am  in agreement. She will call if any symptoms arise.        History of Present Illness   Chief Complaint   Patient presents with    Consult     Pt is here today because there was calcium deposit found on left breast, it was biopsied and was found benign. Pt denies any active breast symptoms in either breast. Her first abnormal mammo was 10 years ago follow by a lump aspiration around that time at [facility]. Personal h/o: denies any cancers. Family h/o : Mother- cancer survivor: bila mastectomy and chemo, Grandmother- left mastectomy but  from pneumonia but with complication from the cancer. No fevers/chills.      Benign cyst aspirated from her breast 10 years ago. Screening mammo in 3/2024 showing dystrophic calcs in the LUOQ, 12 o clock, biopsy performed incidental 1mm intraductal papilloma found and atypical lobular hyperplasia which was benign and concordant, standard clip placed. No symptoms.    Menarche - 10  Pregnancies - 3  Age at youngest pregnancy - 21  OCP - longstanding use  Menopause - 44  HRT - yes, oral pill 5-6 years, vaginal estrace PRN now  Personal - hysterectomy for fibroids, one ovary remains, no cancer on specimen, up to date colonoscopy  Family - mother with breast cancer at 40, required bilateral mastectomies, chemotherapy, has Parkinson's at 93, maternal grandmother with breast cancer, had mastectomy at age 50, paternal uncle with colon cancer, paternal uncle with colon cancer        Review of Systems   Constitutional: Negative.    Skin: Negative.    Hematological: Negative.    All other systems reviewed and are negative.      Historical Information   Past Medical History:   Diagnosis Date    Hyperlipidemia     Tibia/fibula fracture 2016    ORIF     Past Surgical History:   Procedure Laterality Date    BREAST CYST ASPIRATION Left          SECTION      x3:  10/30/1977(F), 1980 (F), 1981 (M)    COLONOSCOPY      HYSTERECTOMY      LEG SURGERY Right     fracture     "MAMMO STEREOTACTIC BREAST BIOPSY LEFT (ALL INC) Left 5/28/2024     Social History   Social History     Substance and Sexual Activity   Alcohol Use No     Social History     Substance and Sexual Activity   Drug Use No     Social History     Tobacco Use   Smoking Status Never   Smokeless Tobacco Never     Family History   Problem Relation Age of Onset    Breast cancer Mother 40    Diabetes Mother     No Known Problems Father     No Known Problems Daughter     No Known Problems Daughter     Breast cancer Maternal Grandmother 50    No Known Problems Maternal Grandfather     Diabetes Paternal Grandmother     No Known Problems Paternal Grandfather     No Known Problems Paternal Aunt     No Known Problems Paternal Aunt     No Known Problems Paternal Aunt     Cancer Family     Colon cancer Neg Hx     Endometrial cancer Neg Hx     Ovarian cancer Neg Hx        Meds/Allergies     Current Outpatient Medications:     ASPIRIN ADULT LOW DOSE PO, Take 81 mg by mouth daily, Disp: , Rfl:     estradiol (ESTRACE VAGINAL) 0.1 mg/g vaginal cream, Insert 1 g into the vagina daily, Disp: 42.5 g, Rfl: 3    ezetimibe (ZETIA) 10 mg tablet, Take 1 tablet (10 mg total) by mouth daily, Disp: 30 tablet, Rfl: 11    ketoconazole (NIZORAL) 2 % shampoo, , Disp: , Rfl:     Omega-3 Fatty Acids (OMEGA 3 PO), Take 1 capsule by mouth daily, Disp: , Rfl:     polyethylene glycol (GLYCOLAX) 17 GM/SCOOP powder, Take 17 g by mouth daily, Disp: 850 g, Rfl: 1  Allergies   Allergen Reactions    Simvastatin Other (See Comments)     MUSCLE WEAKNESS       The following portions of the patient's history were reviewed and updated as appropriate: allergies, current medications, past family history, past medical history, past social history, past surgical history, and problem list.    Objective   Current Vitals:   Blood pressure 114/72, pulse 74, temperature 98.3 °F (36.8 °C), temperature source Tympanic, height 5' 2\" (1.575 m), weight 77.6 kg (171 lb), SpO2 99%, not " currently breastfeeding.    Physical Exam  Vitals reviewed.   Constitutional:       General: She is not in acute distress.     Appearance: She is not toxic-appearing.   HENT:      Head: Normocephalic.      Nose: No congestion.      Mouth/Throat:      Mouth: Mucous membranes are moist.   Eyes:      Pupils: Pupils are equal, round, and reactive to light.   Cardiovascular:      Rate and Rhythm: Normal rate.   Pulmonary:      Effort: Pulmonary effort is normal. No respiratory distress.   Abdominal:      Palpations: Abdomen is soft.   Musculoskeletal:         General: Normal range of motion.      Cervical back: Normal range of motion and neck supple.   Lymphadenopathy:      Cervical: No cervical adenopathy.   Skin:     General: Skin is warm and dry.      Capillary Refill: Capillary refill takes less than 2 seconds.      Findings: No lesion.   Neurological:      General: No focal deficit present.      Mental Status: She is alert.   Psychiatric:         Mood and Affect: Mood normal.       The patient has no palpable cervical, supraclavicular, or axillary lymphadenopathy bilaterally.  The breasts are symmetric.  There are no dominant lumps, masses, skin changes or nipple retraction bilaterally.    I have spent a total time of 60 minutes in caring for this patient on the day of the visit/encounter including Diagnostic results, Prognosis, Risks and benefits of tx options, Instructions for management, Patient and family education, Impressions, Counseling / Coordination of care, Documenting in the medical record, Reviewing / ordering tests, medicine, procedures  , Obtaining or reviewing history  , and Communicating with other healthcare professionals .    Signature:  Emmanuel Campbell MD  Date: 8/3/2024 Time: 11:52 AM

## 2024-08-09 ENCOUNTER — APPOINTMENT (OUTPATIENT)
Dept: LAB | Facility: CLINIC | Age: 69
End: 2024-08-09
Payer: COMMERCIAL

## 2024-08-09 ENCOUNTER — OFFICE VISIT (OUTPATIENT)
Dept: FAMILY MEDICINE CLINIC | Facility: CLINIC | Age: 69
End: 2024-08-09
Payer: COMMERCIAL

## 2024-08-09 VITALS
BODY MASS INDEX: 31.18 KG/M2 | TEMPERATURE: 98.2 F | SYSTOLIC BLOOD PRESSURE: 112 MMHG | DIASTOLIC BLOOD PRESSURE: 80 MMHG | HEART RATE: 68 BPM | HEIGHT: 63 IN | WEIGHT: 176 LBS | OXYGEN SATURATION: 96 %

## 2024-08-09 DIAGNOSIS — J40 BRONCHITIS: Primary | ICD-10-CM

## 2024-08-09 DIAGNOSIS — R73.09 ABNORMAL BLOOD SUGAR: ICD-10-CM

## 2024-08-09 DIAGNOSIS — R59.0 MESENTERIC LYMPHADENOPATHY: ICD-10-CM

## 2024-08-09 DIAGNOSIS — Z13.29 SCREENING FOR THYROID DISORDER: ICD-10-CM

## 2024-08-09 DIAGNOSIS — R31.0 GROSS HEMATURIA: ICD-10-CM

## 2024-08-09 DIAGNOSIS — E78.00 HYPERCHOLESTEROLEMIA: ICD-10-CM

## 2024-08-09 LAB
ALBUMIN SERPL BCG-MCNC: 3.9 G/DL (ref 3.5–5)
ALP SERPL-CCNC: 78 U/L (ref 34–104)
ALT SERPL W P-5'-P-CCNC: 16 U/L (ref 7–52)
ANION GAP SERPL CALCULATED.3IONS-SCNC: 8 MMOL/L (ref 4–13)
AST SERPL W P-5'-P-CCNC: 14 U/L (ref 13–39)
BASOPHILS # BLD AUTO: 0.04 THOUSANDS/ÂΜL (ref 0–0.1)
BASOPHILS NFR BLD AUTO: 1 % (ref 0–1)
BILIRUB SERPL-MCNC: 0.27 MG/DL (ref 0.2–1)
BUN SERPL-MCNC: 11 MG/DL (ref 5–25)
CALCIUM SERPL-MCNC: 8.6 MG/DL (ref 8.4–10.2)
CHLORIDE SERPL-SCNC: 104 MMOL/L (ref 96–108)
CHOLEST SERPL-MCNC: 203 MG/DL
CO2 SERPL-SCNC: 28 MMOL/L (ref 21–32)
CREAT SERPL-MCNC: 0.7 MG/DL (ref 0.6–1.3)
EOSINOPHIL # BLD AUTO: 0.14 THOUSAND/ÂΜL (ref 0–0.61)
EOSINOPHIL NFR BLD AUTO: 2 % (ref 0–6)
ERYTHROCYTE [DISTWIDTH] IN BLOOD BY AUTOMATED COUNT: 15.8 % (ref 11.6–15.1)
EST. AVERAGE GLUCOSE BLD GHB EST-MCNC: 131 MG/DL
GFR SERPL CREATININE-BSD FRML MDRD: 88 ML/MIN/1.73SQ M
GLUCOSE P FAST SERPL-MCNC: 94 MG/DL (ref 65–99)
HBA1C MFR BLD: 6.2 %
HCT VFR BLD AUTO: 43.9 % (ref 34.8–46.1)
HDLC SERPL-MCNC: 63 MG/DL
HGB BLD-MCNC: 13.3 G/DL (ref 11.5–15.4)
IMM GRANULOCYTES # BLD AUTO: 0.02 THOUSAND/UL (ref 0–0.2)
IMM GRANULOCYTES NFR BLD AUTO: 0 % (ref 0–2)
LDLC SERPL CALC-MCNC: 119 MG/DL (ref 0–100)
LYMPHOCYTES # BLD AUTO: 1.04 THOUSANDS/ÂΜL (ref 0.6–4.47)
LYMPHOCYTES NFR BLD AUTO: 15 % (ref 14–44)
MCH RBC QN AUTO: 26 PG (ref 26.8–34.3)
MCHC RBC AUTO-ENTMCNC: 30.3 G/DL (ref 31.4–37.4)
MCV RBC AUTO: 86 FL (ref 82–98)
MONOCYTES # BLD AUTO: 1.03 THOUSAND/ÂΜL (ref 0.17–1.22)
MONOCYTES NFR BLD AUTO: 15 % (ref 4–12)
NEUTROPHILS # BLD AUTO: 4.6 THOUSANDS/ÂΜL (ref 1.85–7.62)
NEUTS SEG NFR BLD AUTO: 67 % (ref 43–75)
NRBC BLD AUTO-RTO: 0 /100 WBCS
PLATELET # BLD AUTO: 230 THOUSANDS/UL (ref 149–390)
PMV BLD AUTO: 10.1 FL (ref 8.9–12.7)
POTASSIUM SERPL-SCNC: 3.8 MMOL/L (ref 3.5–5.3)
PROT SERPL-MCNC: 7.3 G/DL (ref 6.4–8.4)
RBC # BLD AUTO: 5.11 MILLION/UL (ref 3.81–5.12)
SODIUM SERPL-SCNC: 140 MMOL/L (ref 135–147)
TRIGL SERPL-MCNC: 104 MG/DL
TSH SERPL DL<=0.05 MIU/L-ACNC: 2.21 UIU/ML (ref 0.45–4.5)
WBC # BLD AUTO: 6.87 THOUSAND/UL (ref 4.31–10.16)

## 2024-08-09 PROCEDURE — 36415 COLL VENOUS BLD VENIPUNCTURE: CPT

## 2024-08-09 PROCEDURE — 84443 ASSAY THYROID STIM HORMONE: CPT

## 2024-08-09 PROCEDURE — 85025 COMPLETE CBC W/AUTO DIFF WBC: CPT

## 2024-08-09 PROCEDURE — 83036 HEMOGLOBIN GLYCOSYLATED A1C: CPT

## 2024-08-09 PROCEDURE — 99214 OFFICE O/P EST MOD 30 MIN: CPT

## 2024-08-09 PROCEDURE — 80053 COMPREHEN METABOLIC PANEL: CPT

## 2024-08-09 PROCEDURE — 80061 LIPID PANEL: CPT

## 2024-08-09 RX ORDER — BENZONATATE 200 MG/1
200 CAPSULE ORAL 3 TIMES DAILY PRN
Qty: 20 CAPSULE | Refills: 0 | Status: SHIPPED | OUTPATIENT
Start: 2024-08-09

## 2024-08-09 RX ORDER — DOXYCYCLINE HYCLATE 100 MG/1
100 CAPSULE ORAL EVERY 12 HOURS SCHEDULED
Qty: 14 CAPSULE | Refills: 0 | Status: SHIPPED | OUTPATIENT
Start: 2024-08-09 | End: 2024-08-16

## 2024-08-09 NOTE — ASSESSMENT & PLAN NOTE
Patient presents today with signs and symptoms and exam findings suggestive of a possible bronchitis.  Symptoms have been going on for the past 2 weeks and patient is still having coughing, body aches, fevers and dizziness from taking so much cough and cold medicine.  Patient has had similar illnesses in the past which have done well with antibiotic treatment.  Will favor doxycycline twice daily x 7 days along with Tessalon Perles to help with her cough.  She can continue over-the-counter cough medications if needed if Tessalon Perles do not work well for her.  Pulmonary exam is normal today, no need for chest x-ray.  Patient may use inhaler as needed to help with the cough.  No need for oral steroid treatment at this time due to normal lung exam.  Recommend follow-up if symptoms persist after antibiotic use or if symptoms worsen.

## 2024-08-09 NOTE — PROGRESS NOTES
No Ambulatory Visit  Name: Enma Chauhan      : 1955      MRN: [id number]  Encounter Provider: Kavya Crystal PA-C  Encounter Date: 2024   Encounter department: [facility]    Assessment & Plan   1. Bronchitis  Assessment & Plan:  Patient presents today with signs and symptoms and exam findings suggestive of a possible bronchitis.  Symptoms have been going on for the past 2 weeks and patient is still having coughing, body aches, fevers and dizziness from taking so much cough and cold medicine.  Patient has had similar illnesses in the past which have done well with antibiotic treatment.  Will favor doxycycline twice daily x 7 days along with Tessalon Perles to help with her cough.  She can continue over-the-counter cough medications if needed if Tessalon Perles do not work well for her.  Pulmonary exam is normal today, no need for chest x-ray.  Patient may use inhaler as needed to help with the cough.  No need for oral steroid treatment at this time due to normal lung exam.  Recommend follow-up if symptoms persist after antibiotic use or if symptoms worsen.  Orders:  -     doxycycline hyclate (VIBRAMYCIN) 100 mg capsule; Take 1 capsule (100 mg total) by mouth every 12 (twelve) hours for 7 days  -     benzonatate (TESSALON) 200 MG capsule; Take 1 capsule (200 mg total) by mouth 3 (three) times a day as needed for cough       History of Present Illness     Patient presents today for evaluation of cold symptoms that she has had for the past 2 weeks.  Symptoms include coughing, fever, body aches, chills, dizziness.  Patient believes dizziness is likely from the cough and cold medication that she is taking as this is a typical side she has tried several over-the-counter medications without dilution of her symptoms.  She has no shortness of breath or chest pain however admits to chest tightness from coughing so much.  No sick contacts.  Did not test for COVID at beginning of  "symptoms.        Review of Systems   Constitutional:  Positive for chills, fatigue and fever.   HENT:  Negative for postnasal drip, sinus pressure and sinus pain.    Respiratory:  Positive for cough and chest tightness. Negative for shortness of breath.    Cardiovascular:  Negative for chest pain, palpitations and leg swelling.   Neurological:  Positive for dizziness. Negative for light-headedness and headaches.       Objective     /80 (BP Location: Left arm, Patient Position: Sitting, Cuff Size: Standard)   Pulse 68   Temp 98.2 °F (36.8 °C)   Ht 5' 2.6\" (1.59 m)   Wt 79.8 kg (176 lb)   LMP  (LMP Unknown)   SpO2 96%   BMI 31.58 kg/m²     Physical Exam  Vitals and nursing note reviewed.   Constitutional:       General: She is not in acute distress.     Appearance: Normal appearance. She is normal weight. She is not ill-appearing.   HENT:      Head: Normocephalic and atraumatic.      Right Ear: Tympanic membrane normal.      Left Ear: Tympanic membrane normal.      Nose: Nose normal. No congestion.      Mouth/Throat:      Mouth: Mucous membranes are moist.      Pharynx: Oropharynx is clear. No oropharyngeal exudate or posterior oropharyngeal erythema.   Cardiovascular:      Rate and Rhythm: Normal rate and regular rhythm.   Pulmonary:      Effort: Pulmonary effort is normal. No respiratory distress.      Breath sounds: Normal breath sounds.      Comments: Cough heard on exam today, productive.  Musculoskeletal:      Right lower leg: No edema.      Left lower leg: No edema.   Skin:     General: Skin is warm and dry.      Coloration: Skin is not cyanotic or pale.   Neurological:      General: No focal deficit present.      Mental Status: She is alert and oriented to person, place, and time. Mental status is at baseline.   Psychiatric:         Mood and Affect: Mood normal.         Behavior: Behavior normal.         Judgment: Judgment normal.       Administrative Statements       [facility]

## 2024-09-12 PROBLEM — Z87.442 HISTORY OF KIDNEY STONES: Status: ACTIVE | Noted: 2024-09-12

## 2024-09-18 DIAGNOSIS — N95.2 VAGINAL ATROPHY: ICD-10-CM

## 2024-09-19 RX ORDER — ESTRADIOL 0.1 MG/G
CREAM VAGINAL
Qty: 42.5 G | Refills: 1 | Status: SHIPPED | OUTPATIENT
Start: 2024-09-19

## 2024-09-23 ENCOUNTER — HOSPITAL ENCOUNTER (OUTPATIENT)
Dept: RADIOLOGY | Facility: HOSPITAL | Age: 69
Discharge: HOME | End: 2024-09-23
Attending: SURGERY
Payer: COMMERCIAL

## 2024-09-23 DIAGNOSIS — Z80.9 FAMILY HISTORY OF CANCER: ICD-10-CM

## 2024-09-23 DIAGNOSIS — N60.92 ATYPICAL LOBULAR HYPERPLASIA (ALH) OF LEFT BREAST: ICD-10-CM

## 2024-09-23 PROCEDURE — A9585 GADOBUTROL INJECTION: HCPCS | Performed by: SURGERY

## 2024-09-23 PROCEDURE — C8908 MRI W/O FOL W/CONT, BREAST,: HCPCS

## 2024-09-23 RX ORDER — GADOBUTROL 604.72 MG/ML
7 INJECTION INTRAVENOUS
Status: COMPLETED | OUTPATIENT
Start: 2024-09-23 | End: 2024-09-23

## 2024-09-23 RX ADMIN — GADOBUTROL 7 ML: 604.72 INJECTION INTRAVENOUS at 18:09

## 2024-09-24 ENCOUNTER — TELEPHONE (OUTPATIENT)
Dept: SURGERY | Facility: CLINIC | Age: 69
End: 2024-09-24

## 2024-09-24 NOTE — TELEPHONE ENCOUNTER
----- Message from Emmanuel Campbell MD sent at 9/24/2024  5:54 PM EDT -----  Please let the patient know that there are some enhancing nodules in the breasts that are likely benign. The breast radiology group is recommending that she return for a left diagnostic ultrasound to more further understand these and to try to see what the correlate is between the ultrasound findings and the MRI findings to make sure that they do not appear high risk. Please help to order and schedule a left breast diagnostic ultrasound.    Please also check if the patient has had her genetic testing performed or if she is scheduled for a consult with them. It is not urgent. Thank you.

## 2024-09-24 NOTE — RESULT ENCOUNTER NOTE
Please let the patient know that there are some enhancing nodules in the breasts that are likely benign. The breast radiology group is recommending that she return for a left diagnostic ultrasound to more further understand these and to try to see what the correlate is between the ultrasound findings and the MRI findings to make sure that they do not appear high risk. Please help to order and schedule a left breast diagnostic ultrasound.    Please also check if the patient has had her genetic testing performed or if she is scheduled for a consult with them. It is not urgent. Thank you.

## 2024-11-18 ENCOUNTER — RESULTS FOLLOW-UP (OUTPATIENT)
Dept: OTHER | Facility: HOSPITAL | Age: 69
End: 2024-11-18

## 2024-11-18 ENCOUNTER — HOSPITAL ENCOUNTER (OUTPATIENT)
Dept: ULTRASOUND IMAGING | Facility: CLINIC | Age: 69
Discharge: HOME | End: 2024-11-18
Payer: COMMERCIAL

## 2024-11-18 DIAGNOSIS — R92.8 ABNORMAL MAMMOGRAM: ICD-10-CM

## 2024-11-18 PROCEDURE — 76642 ULTRASOUND BREAST LIMITED: CPT

## 2024-11-18 NOTE — RESULT ENCOUNTER NOTE
Please help the patient to coordinate a left breast MRI guided biopsy as recommended here. We should arrange follow up with me once the biopsy results are back. Thanks.

## 2024-11-19 DIAGNOSIS — N60.92 ATYPICAL LOBULAR HYPERPLASIA (ALH) OF LEFT BREAST: Primary | ICD-10-CM

## 2024-11-20 DIAGNOSIS — N95.2 VAGINAL ATROPHY: ICD-10-CM

## 2024-11-21 RX ORDER — ESTRADIOL 0.1 MG/G
CREAM VAGINAL
Qty: 42.5 G | Refills: 2 | Status: SHIPPED | OUTPATIENT
Start: 2024-11-21

## 2025-01-07 ENCOUNTER — TELEPHONE (OUTPATIENT)
Dept: SURGERY | Facility: CLINIC | Age: 70
End: 2025-01-07

## 2025-01-07 NOTE — TELEPHONE ENCOUNTER
Called patient and spoke with her about getting her insurance information due to the one in the system being inactive. Patient states that she didn't have it on her and would call her  to get the info and that he would call the office back.

## 2025-01-07 NOTE — TELEPHONE ENCOUNTER
Patient's  called back to provide insurance information; advised it was already updated and apologized for inconvenience.

## 2025-01-10 ENCOUNTER — HOSPITAL ENCOUNTER (OUTPATIENT)
Dept: RADIOLOGY | Facility: HOSPITAL | Age: 70
Discharge: HOME | End: 2025-01-10
Attending: SURGERY
Payer: COMMERCIAL

## 2025-01-10 VITALS — SYSTOLIC BLOOD PRESSURE: 130 MMHG | DIASTOLIC BLOOD PRESSURE: 75 MMHG

## 2025-01-10 DIAGNOSIS — N60.92 ATYPICAL LOBULAR HYPERPLASIA (ALH) OF LEFT BREAST: ICD-10-CM

## 2025-01-10 PROCEDURE — A9585 GADOBUTROL INJECTION: HCPCS | Performed by: SURGERY

## 2025-01-10 PROCEDURE — 19085 BX BREAST 1ST LESION MR IMAG: CPT

## 2025-01-10 PROCEDURE — A4648 IMPLANTABLE TISSUE MARKER: HCPCS

## 2025-01-10 RX ORDER — LIDOCAINE HYDROCHLORIDE AND EPINEPHRINE BITARTRATE 20; .01 MG/ML; MG/ML
20 INJECTION, SOLUTION SUBCUTANEOUS ONCE
Status: COMPLETED | OUTPATIENT
Start: 2025-01-10 | End: 2025-01-10

## 2025-01-10 RX ORDER — LIDOCAINE HYDROCHLORIDE 10 MG/ML
5 INJECTION, SOLUTION EPIDURAL; INFILTRATION; INTRACAUDAL; PERINEURAL ONCE
Status: COMPLETED | OUTPATIENT
Start: 2025-01-10 | End: 2025-01-10

## 2025-01-10 RX ORDER — GADOBUTROL 604.72 MG/ML
10 INJECTION INTRAVENOUS
Status: COMPLETED | OUTPATIENT
Start: 2025-01-10 | End: 2025-01-10

## 2025-01-10 RX ADMIN — LIDOCAINE HYDROCHLORIDE 5 ML: 10 INJECTION, SOLUTION EPIDURAL; INFILTRATION; INTRACAUDAL; PERINEURAL at 09:30

## 2025-01-10 RX ADMIN — GADOBUTROL 10 ML: 604.72 INJECTION INTRAVENOUS at 09:29

## 2025-01-10 RX ADMIN — LIDOCAINE HYDROCHLORIDE,EPINEPHRINE BITARTRATE 20 ML: 20; .01 INJECTION, SOLUTION INFILTRATION; PERINEURAL at 09:30

## 2025-01-10 NOTE — NURSING NOTE
Patient's left breast biopsy completed by MD Pandey, patient tolerated well with no complaints verbalized. After holding pressure to site, steri-strips applied and band aide. Post procedure discharge instructions and ice packs given to patient with verbal communication of understanding. Patient left MRI suite ambulating with no complaints.

## 2025-01-21 ENCOUNTER — TELEPHONE (OUTPATIENT)
Dept: SURGERY | Facility: CLINIC | Age: 70
End: 2025-01-21

## 2025-01-21 ENCOUNTER — TELEPHONE (OUTPATIENT)
Dept: GENETICS | Facility: CLINIC | Age: 70
End: 2025-01-21

## 2025-01-21 ENCOUNTER — RESULTS FOLLOW-UP (OUTPATIENT)
Dept: SURGERY | Facility: CLINIC | Age: 70
End: 2025-01-21

## 2025-01-21 DIAGNOSIS — C50.912 INVASIVE DUCTAL CARCINOMA OF BREAST, FEMALE, LEFT (HCC): Primary | ICD-10-CM

## 2025-01-21 DIAGNOSIS — N60.92 ATYPICAL LOBULAR HYPERPLASIA (ALH) OF LEFT BREAST: ICD-10-CM

## 2025-01-21 DIAGNOSIS — Z80.3 FAMILY HISTORY OF BREAST CANCER: ICD-10-CM

## 2025-01-21 DIAGNOSIS — Z80.9 FAMILY HISTORY OF CANCER: ICD-10-CM

## 2025-01-21 DIAGNOSIS — R92.8 ABNORMAL MRI, BREAST: ICD-10-CM

## 2025-01-21 DIAGNOSIS — D05.12 NEOPLASM OF LEFT BREAST, PRIMARY TUMOR STAGING CATEGORY TIS: DUCTAL CARCINOMA IN SITU (DCIS): Primary | ICD-10-CM

## 2025-01-21 DIAGNOSIS — Z80.42 FAMILY HISTORY OF PROSTATE CANCER: ICD-10-CM

## 2025-01-21 NOTE — TELEPHONE ENCOUNTER
Discussed with Dr. Hayes. Will not proceed with axillary US at this point due to negative MRI in the axilla as well as the DCIS diagnosis.    DCIS is at 10 oclock, ALH at 12 oclock, they are about 3-4cm apart. Pending final results of upcoming mammogram and MRI biopsies x 2, can seek out mammo guided jennifer placement at DCIS and ALH sites for surgical localization.

## 2025-01-21 NOTE — TELEPHONE ENCOUNTER
I did reach out to Dr. Hayes about the role of axillary US in light of the negative axilla on recent breast MRI, awaiting his thoughts. For now I do not plan on the US unless he feels it will add to the understanding of her case since the axilla looked ok to me on the MRI. Will update once he reviews. FYI genetics outreaching to patient to offer her stat pathway.

## 2025-01-21 NOTE — TELEPHONE ENCOUNTER
I introduced myself to Enma and let her know that her surgeon reached out to the cancer risk and genetics program on her behalf to begin STAT genetic testing process.    I reviewed the following with Enma:    While the majority of cancer occurs by chance, approximately 5-10% of breast cancer has an underlying genetic cause. Genetic testing is available which can determine if there is an underlying genetic cause to your cancer. Understanding if there is an underlying genetic cause can:  Provide your surgeon with additional information to help with surgical decisions (i.e. lumpectomy vs mastectomy), treatment decisions and eligibility for clinical trials.    It can determine if you have an increased risk for any additional cancers.  Help family members understand their cancer risk.       We work closely with the [facility] breast surgeons and are reaching out to see if you have interest in genetic testing. This test is not a requirement but can take 5-10 days to complete so we would like to start the process as soon as possible so the results are ready for your appointment with your surgeon.       If interested, family history will be collected to initiate STAT genetic testing process.        Patient elected to pursue testing     Diagnosis Details:  Invasive Ductal Carcinoma  Left  Hormone receptors pending    Personal History:  Do you have a personal history of any other cancer? No  If yes type/age of diagnosis:     Family history:   Do you have Ashkenazi Anglican ancestry? No  If yes, maternal, paternal, or both?    Do you have any children? Yes  How many sons? 1  How many daughters? 2  Do any of your children have a history of cancer? No  If yes type/age of diagnosis:     Do you have any brothers or sisters? Yes  How many brothers? 1  How many sisters? 0  Are they from the same parents? No  If no how maternal/paternal half-siblings:  Do any of your brothers or sisters have a history of cancer? No  If yes who  and the type/age of diagnosis:           Do you have nieces or nephews? Yes  Do any of them have a history of cancer? No  If yes type/age of diagnosis:    Does your mother have a history of cancer? Yes  If yes, cancer type and age of diagnosis: Breast Cancer age 40; B/L Mast  Is your mother still living? Yes  Age/Age of death: 93    Thinking about your mother's family (aunts, uncles, cousins, grandparents) is there anyone with a history of cancer? Yes  If yes, list relationship, cancer type and age of diagnosis:  MGM - Breast Cancer age 50 ()    Does your father have a history of cancer? No  If yes, cancer type and age of diagnosis:   Is your father still living? No  Age/age of death: 67    Thinking about your father's family (aunts, uncles, cousins, grandparents) is there anyone with a history of cancer? Yes  If yes, list relationship, cancer type and age of diagnosis:   Paternal Uncle - Prostate Cancer age 60s ()  Paternal Uncle - Prostate Cancer age 60s ()     Types of Results  Positive Result - May explain personal diagnosis/family history. Can give surgeon information on treatment plan, inform future screening/management or tell a person about other possible risks. Positive results can initiate testing for other family members who may be at risk (children, siblings, etc)  Negative Result - Does not give an explanation. Surgical/treatment plan will be based on clinical presentation and will be part of discussion with surgeon. Negative result cannot be passed down to children, but they are still at elevated risk.  Uncertain Result - Common, but treated like a negative result clinically. 90% are downgraded over time.     [employer]'s billing policy   Most individuals pay <$100 for hereditary cancer genetic testing. If insurance covers the cost of the testing, individuals may still pay out of pocket secondary to co-pays, co-insurance, or deductibles. If the cost of the testing exceeds  $100, the lab will reach out to the patient via phone or e-mail. The patient will then have the option to proceed with the testing, cancel the testing, or elect the self-pay option of $250. Enma verbalized understanding.    We have genetic counselors available, if you have any additional questions or would like to speak with them we can schedule you a 15 minute appointment.      Plan:  An order was placed and the patient was instructed to go to a [facility] lab for a blood collection    Genetic Testing Preformed: [facility] BRCAplus STAT Panel (13 genes): JOIE, BARD1, BRCA1, BRCA2, CDH1, CHEK2, NF1, PALB2, PTEN, RAD51C, RAD51D, STK11, TP53 with reflex to [facility] CustomNext: Cancer+RNAinsight (59 genes): APC, JOIE, AXIN2, BAP1, BARD1, BMPR1A, BRCA1, BRCA2, BRIP1, CDH1, CDK4, CDKN1B, CDKN2A, CHEK2, CTNNA1, DICER1, EGLN1, EPCAM, FH, FLCN, GREM1, HOXB13, KIF1B, KIT, MAX, MEN1, MET, MITF, MLH1, MSH2, MSH3, MSH6, MUTYH, NF1, NTHL1, PALB2, PDGFRA PMS2, POLD1, POLE, POT1, PTEN, RAD51C, RAD51D, RB1, RET, SDHA, SDHAF2, SDHB, SDHC, SDHD, SMAD4, SMARCA4, STK11, LQAY283, TP53, TSC1, TSC2, VHL     Initial results will take approximately 5-12 days to return     Additional results may take up to 2-3 weeks to complete once test is started    Result Call Information:    Patient agreeable to phone call only if results are positive/complex    In the event that we need to reach Enma via telephone:  I confirmed the patient's mobile number on file as the best number to call with results  I confirmed with the patient that we can leave a voicemail on the provided numbers    Patient does not have any further questions, declined meeting with genetic counselor    When your results are ready, your results will be available in your my chart. If you would like, someone from the genetics team will call you with any type of result- positive, negative or uncertain. Otherwise our team will only call to review significant or complex result. We will  make your care team aware of your result.

## 2025-01-21 NOTE — TELEPHONE ENCOUNTER
Called patient and updated her regarding the biopsy results showing low grade DCIS. I explained that we would request to have her genetics moved up from February to now and sent as stat, genetics messaged. Goal is genetics appointment on 1/28 when she sees me in [place].     ER/NM added on and pending per my discussion with pathology.    She will also need stat bilateral diagnostic mammogram, then a stat 2 site left breast MRI guided biopsy per Dr. Hayes. Patient in agreement. Stat orders placed.    Meli - do you mind getting the mammogram and MRI biopsy scheduling process going and outreaching to the patient? Patient expecting a call about this.     I outreached to genetics about that side of things.     She will see me 1/28 to start the discussion, then likely another visit after the mammogram/MRI biopsies/genetics resulted.    Will eventually need jennifer reflectors once we know the surgical plan.

## 2025-01-22 ENCOUNTER — TELEPHONE (OUTPATIENT)
Dept: BARIATRICS | Facility: CLINIC | Age: 70
End: 2025-01-22

## 2025-01-22 ENCOUNTER — APPOINTMENT (OUTPATIENT)
Dept: LAB | Facility: CLINIC | Age: 70
End: 2025-01-22
Payer: COMMERCIAL

## 2025-01-22 ENCOUNTER — PATIENT OUTREACH (OUTPATIENT)
Dept: HEMATOLOGY ONCOLOGY | Facility: CLINIC | Age: 70
End: 2025-01-22

## 2025-01-22 ENCOUNTER — APPOINTMENT (OUTPATIENT)
Dept: LAB | Facility: MEDICAL CENTER | Age: 70
End: 2025-01-22
Payer: COMMERCIAL

## 2025-01-22 ENCOUNTER — TELEPHONE (OUTPATIENT)
Age: 70
End: 2025-01-22

## 2025-01-22 DIAGNOSIS — Z80.3 FAMILY HISTORY OF BREAST CANCER: ICD-10-CM

## 2025-01-22 DIAGNOSIS — R73.03 PREDIABETES: Primary | ICD-10-CM

## 2025-01-22 DIAGNOSIS — Z80.42 FAMILY HISTORY OF PROSTATE CANCER: ICD-10-CM

## 2025-01-22 DIAGNOSIS — R73.03 PREDIABETES: ICD-10-CM

## 2025-01-22 LAB
EST. AVERAGE GLUCOSE BLD GHB EST-MCNC: 134 MG/DL
HBA1C MFR BLD: 6.3 %

## 2025-01-22 PROCEDURE — 83036 HEMOGLOBIN GLYCOSYLATED A1C: CPT

## 2025-01-22 PROCEDURE — 36415 COLL VENOUS BLD VENIPUNCTURE: CPT

## 2025-01-22 NOTE — TELEPHONE ENCOUNTER
Spoke with  Devorah Suazo RD in regards to scheduling patient for an RD appointment per Goldie Arana PA-C and it was suggested that patient at this time not be seen by an RD with Weight Management  and that patient can follow with an Oncologist RD since patient was recently diagnosed with Breast Cancer.

## 2025-01-23 ENCOUNTER — PATIENT OUTREACH (OUTPATIENT)
Dept: HEMATOLOGY ONCOLOGY | Facility: CLINIC | Age: 70
End: 2025-01-23

## 2025-01-23 ENCOUNTER — TELEPHONE (OUTPATIENT)
Age: 70
End: 2025-01-23

## 2025-01-23 ENCOUNTER — TELEPHONE (OUTPATIENT)
Dept: MAMMOGRAPHY | Facility: CLINIC | Age: 70
End: 2025-01-23

## 2025-01-23 ENCOUNTER — RESULTS FOLLOW-UP (OUTPATIENT)
Dept: FAMILY MEDICINE CLINIC | Facility: CLINIC | Age: 70
End: 2025-01-23

## 2025-01-23 DIAGNOSIS — N60.92 ATYPICAL LOBULAR HYPERPLASIA (ALH) OF LEFT BREAST: Primary | ICD-10-CM

## 2025-01-23 NOTE — PROGRESS NOTES
Breast Oncology Nurse Navigator    Called patient for initial outreach from nurse navigator.  Introduced myself and my role as well as members of the navigation team.  Oncology Nurse Navigator will follow patient until they are seen by surgical oncology, after which the patient navigator initiate outreach and follow the patient to survivorship.      Referral received from Dr. Campbell  Breast cancer diagnosis was made after MRI biopsy was done, pathology was positive for DCIS, ER+ 81-90%, AL+ 81-90%. Patient had a history of ALH with an intraductal papilloma, MRI done for high risk screening.  Patient states some understanding/awareness of diagnosis    Any other issues/diagnoses?  no.    Is patient a current smoker: no    Confirmed upcoming appointment with Dr. Campbell, including date, time and location.  Patient will be accompanied by her   Provided brief explanation of what to expect at this visit.  Patient has transportation to appointments.      Patient lives with   Support system includes:  and three children  Referral placed to oncology social worker: no    Cancer family history includes: mother and maternal grandmother both had breast cancer  Referral to oncology genetics: yes 1/21/2025  Does patient have a prior cancer diagnosis?  no  Any previous radiation treatment to the chest?  no  Is patient pre/post menopausal?  post       Discussed the Cancer Support Community and some of the programs and services offered  Discussed Breast Cancer Support group that meets monthly at [facility].  Information sent via [employer].    Patient has my contact information and knows she can reach out with questions.  Office hours provided.  Patient provided with the phone number for Spgstdrx-[phone].  General assessment completed.  Patient does have [employer] set up  [employer] message sent with our team's contact information.

## 2025-01-23 NOTE — PROGRESS NOTES
I reached out and spoke with Enma, now that consults have been completed with the oncology teams. I introduced myself and explained my role as their Patient Navigator. I reviewed for any barriers to care and offered supportive services as needed. I reviewed and updated the members assigned to the care team in [facility]. She knows the members of the care team as well as how and when to contact them with any needs.     Distress Thermometer completed at this time. Patient scored 7/10. Referral to SW placed..     Patient mentioned that it was nice to speak with someone about her diagnosis.  She mentioned no one asked about how she was feeling.  Enma has not told her children/family about her diagnosis and mentions that it was nice to get to say things out loud. Mentioned to patient that how she answered some things I would be placing a referral to social work. Patient was ok with this and liked the idea of speaking with someone about her diagnosis.    Patient additionally mentioned worried with taking care of her elderly mother in the near future depending on if she will require surgery or treatment.    Enma was concerned about her appearance recently since her weight gain.    She is currently able to drive and denies any transportation needs.    Patient and  both still driving currently no concerns.    She denies any uncontrolled symptoms. Discussed role of Palliative Care in symptom and side effect management. Declined referral at this time.    She states that she is eating and drinking as per usual with no unintentional weight loss.     Patient mentions weight gain recently and her eating habits have not changed.    Patient does not smoke.     She states she does not have adequate support from family or friends.   Patient mentions not telling her family members about her cancer diagnosis especially her adult children. Patient mentions wanting to tell them after her appointment with . Patient  wants to have all the information first before making her family/children aware of her diagnosis. Patient asked for my personal opinion on this and I just mentioned that- that's a hard question and I do understand her wanting to come to her family with all the information at once since they will have questions.     Community support groups discussed including the Cancer Support Community of the [facility]. Patient declined information at this time.     She feels she has adequate insurance coverage and denies any financial concerns at this time. [facility] and mentioned that she heard/read somewhere that they would not cover certain cancer related costs. Patient expressed concern with getting treatment/surgery and in the middle of things her insurance not covering things. Patient did mentioned wanting to switch her insurance.    She verbalizes managing the schedules well.   Future Appointments   Date Time Provider Department Center   1/24/2025 12:15 PM [place] MRI 1 [facility]   1/27/2025 10:00 AM BE MAMMO RBC 3 BE RBC Mammo BE RBC   1/28/2025  9:30 AM BERNARD Rutherford [facility]   1/28/2025  2:45 PM Emmanuel Campbell MD Gen Surg C[facility]        Based on individual needs I will follow up in about 4/6 weeks. I have provided my direct contact information and welcome them to contact me if needs as discussed above change. She was appreciative for the call.

## 2025-01-23 NOTE — RESULT ENCOUNTER NOTE
Great news, DCIS is ER/CO+, hormone sensitive. This is the type we would want it to be to allow for future hormone manipulation/endocrine therapy, which is an oral pill to help decrease breast cancer recurrence.

## 2025-01-23 NOTE — TELEPHONE ENCOUNTER
Tiffanie called in from [facility] Pre-encounter department. Warm transferred caller to [facility] for further assistance.

## 2025-01-24 ENCOUNTER — PATIENT OUTREACH (OUTPATIENT)
Dept: CASE MANAGEMENT | Facility: OTHER | Age: 70
End: 2025-01-24

## 2025-01-24 ENCOUNTER — HOSPITAL ENCOUNTER (OUTPATIENT)
Dept: RADIOLOGY | Facility: HOSPITAL | Age: 70
Discharge: HOME | End: 2025-01-24
Payer: COMMERCIAL

## 2025-01-24 VITALS — HEART RATE: 80 BPM | DIASTOLIC BLOOD PRESSURE: 69 MMHG | SYSTOLIC BLOOD PRESSURE: 148 MMHG

## 2025-01-24 DIAGNOSIS — D05.12 NEOPLASM OF LEFT BREAST, PRIMARY TUMOR STAGING CATEGORY TIS: DUCTAL CARCINOMA IN SITU (DCIS): ICD-10-CM

## 2025-01-24 DIAGNOSIS — R92.8 ABNORMAL MRI, BREAST: ICD-10-CM

## 2025-01-24 DIAGNOSIS — Z80.9 FAMILY HISTORY OF CANCER: ICD-10-CM

## 2025-01-24 DIAGNOSIS — N60.92 ATYPICAL LOBULAR HYPERPLASIA (ALH) OF LEFT BREAST: ICD-10-CM

## 2025-01-24 PROCEDURE — 88341 IMHCHEM/IMCYTCHM EA ADD ANTB: CPT | Performed by: PATHOLOGY

## 2025-01-24 PROCEDURE — A9585 GADOBUTROL INJECTION: HCPCS | Performed by: SURGERY

## 2025-01-24 PROCEDURE — A4648 IMPLANTABLE TISSUE MARKER: HCPCS

## 2025-01-24 PROCEDURE — 88342 IMHCHEM/IMCYTCHM 1ST ANTB: CPT | Performed by: PATHOLOGY

## 2025-01-24 PROCEDURE — 88305 TISSUE EXAM BY PATHOLOGIST: CPT | Performed by: PATHOLOGY

## 2025-01-24 PROCEDURE — 19085 BX BREAST 1ST LESION MR IMAG: CPT

## 2025-01-24 RX ORDER — LIDOCAINE HYDROCHLORIDE AND EPINEPHRINE BITARTRATE 20; .01 MG/ML; MG/ML
20 INJECTION, SOLUTION SUBCUTANEOUS ONCE
Status: COMPLETED | OUTPATIENT
Start: 2025-01-24 | End: 2025-01-24

## 2025-01-24 RX ORDER — LIDOCAINE HYDROCHLORIDE 10 MG/ML
5 INJECTION, SOLUTION EPIDURAL; INFILTRATION; INTRACAUDAL; PERINEURAL ONCE
Status: COMPLETED | OUTPATIENT
Start: 2025-01-24 | End: 2025-01-24

## 2025-01-24 RX ORDER — GADOBUTROL 604.72 MG/ML
8 INJECTION INTRAVENOUS
Status: COMPLETED | OUTPATIENT
Start: 2025-01-24 | End: 2025-01-24

## 2025-01-24 RX ADMIN — LIDOCAINE HYDROCHLORIDE,EPINEPHRINE BITARTRATE 20 ML: 20; .01 INJECTION, SOLUTION INFILTRATION; PERINEURAL at 13:23

## 2025-01-24 RX ADMIN — LIDOCAINE HYDROCHLORIDE 5 ML: 10 INJECTION, SOLUTION EPIDURAL; INFILTRATION; INTRACAUDAL; PERINEURAL at 13:22

## 2025-01-24 RX ADMIN — LIDOCAINE HYDROCHLORIDE 5 ML: 10 INJECTION, SOLUTION EPIDURAL; INFILTRATION; INTRACAUDAL; PERINEURAL at 13:21

## 2025-01-24 RX ADMIN — GADOBUTROL 8 ML: 604.72 INJECTION INTRAVENOUS at 13:28

## 2025-01-24 RX ADMIN — LIDOCAINE HYDROCHLORIDE,EPINEPHRINE BITARTRATE 20 ML: 20; .01 INJECTION, SOLUTION INFILTRATION; PERINEURAL at 13:24

## 2025-01-24 NOTE — PROGRESS NOTES
Procedure type:    _____ultrasound guided _____stereotactic ___x__MRI     Breast:    __x___Left _____Right    Location:  3:00 anterior position    Needle: 9g ATEC    # of passes: 8    Clip: tophat    Performed by: Dr. Leyva    Pressure held for 5 minutes by: Jayashree Whitten Strips:    __x___yes _____no    Band aid:    __x___yes_____no    Tape, gauze and ace bandage pressure dressing applied    __x___yes _____no    Tolerated procedure:    ___x__yes _____no      Procedure type:    _____ultrasound guided _____stereotactic  __x___MRI    Breast:    __x___Left _____Right    Location:5:00 middle    Needle: 9g ATEC    # of passes: 6    Clip: cork    Performed by: Dr. Leyva    Pressure held for 5 minutes by:Jayashree Whitten Strips:     _x____yes _____no    Band aid:    __x___yes_____no    Tape, gauze and ace bandage pressure dressing applied    __x___yes _____no    Tolerated procedure:    ___x__yes _____no

## 2025-01-24 NOTE — PROGRESS NOTES
Ice pack given:    _____yes ___x__no    Discharge instructions reviewed & given to patient:    ___x__yes _____no    Discharged via:    __x___ambulatory    _____wheelchair    _____stretcher    Biopsy sites clean & dry with pt stable on discharge:    __x___yes ____no

## 2025-01-24 NOTE — DISCHARGE INSTR - OTHER ORDERS
POST LARGE CORE BREAST BIOPSY PROCEDURE: PATIENT INFORMATION      Place an ice pack inside your bra over the top of the gauze dressing every hour for 20 minutes (20 minutes on, 60 minutes off). Do this until bedtime. The ice pack should be used whether pain is or is not present, as it significantly reduces the chance for bruising, bleeding, or hematoma development at home after your procedure. Please use as instructed.    Do not shower or bathe until the following morning Saturday 01/25/2025 @ 3pm.    You may shower/bathe your breast carefully with the steri-strips in place.  Be careful not to loosen them.  The steri-strips should remain in place 3-5 days to allow adequate time for your body to heal the breast biopsy incision site. If steri-strips have not fallen off on their own by Wednesday evening 01/29/2025, it would be appropriate to remove them.    You may have mild discomfort, and you may have some bruising where the needle entered the skin. Following a breast biopsy, it can be very common to have bruising around the biopsy site & in some cases the underside of the breast due to positioning during the procedure. This should clear within 1-2 weeks time post-procedure.    If you need medicine for discomfort, take acetaminophen products such as Tylenol. You may also take Advil or Motrin products. Avoid using any aspirin based products (avoid Aleve, avoid Naproxen), as these medications can increase the risk for bleeding/ hematoma development at breast biopsy site.    Do not participate in strenuous activities such as-tennis, aerobics, skiing, weight lifting > 10 lbs, etc. for 24 hours. Refrain from swimming/soaking until biopsy incision is fully healed to reduce any risk for infection.    Wearing a bra for sleeping may be more comfortable for the first 24-48 hours.    Watch for continued bleeding, pain or fever over 101. If any of these symptoms occur, please contact our breast nurse navigator at the location  where your biopsy was performed.    During normal business hours (7:30 am-4:00 pm) please call the nurse navigator at the site where your   procedure was performed:    [facility] Jake/ Xin:  [phone], [phone] or [phone]  [facility] [place]:  [facility] RN P# [phone]         or      Alina FIORE P# [phone]              After 4 PM - please call your physician or go to the nearest Emergency Department location.          9.         The final results of your biopsy are usually available within one week.

## 2025-01-27 ENCOUNTER — PATIENT OUTREACH (OUTPATIENT)
Dept: CASE MANAGEMENT | Facility: OTHER | Age: 70
End: 2025-01-27

## 2025-01-27 ENCOUNTER — HOSPITAL ENCOUNTER (OUTPATIENT)
Dept: MAMMOGRAPHY | Facility: CLINIC | Age: 70
Discharge: HOME | End: 2025-01-27
Payer: COMMERCIAL

## 2025-01-27 VITALS — BODY MASS INDEX: 35.81 KG/M2 | WEIGHT: 182.4 LBS | HEIGHT: 60 IN

## 2025-01-27 DIAGNOSIS — N60.92 ATYPICAL LOBULAR HYPERPLASIA (ALH) OF LEFT BREAST: ICD-10-CM

## 2025-01-27 DIAGNOSIS — D05.12 NEOPLASM OF LEFT BREAST, PRIMARY TUMOR STAGING CATEGORY TIS: DUCTAL CARCINOMA IN SITU (DCIS): ICD-10-CM

## 2025-01-27 DIAGNOSIS — R92.8 ABNORMAL MRI, BREAST: ICD-10-CM

## 2025-01-27 DIAGNOSIS — Z80.9 FAMILY HISTORY OF CANCER: ICD-10-CM

## 2025-01-27 PROCEDURE — G0279 TOMOSYNTHESIS, MAMMO: HCPCS

## 2025-01-27 PROCEDURE — 77066 DX MAMMO INCL CAD BI: CPT

## 2025-01-27 NOTE — PROGRESS NOTES
Name: Enma Chauhan      : 1955      MRN: [id number]  Encounter Provider: BERNARD Rutherford  Encounter Date: 2025   Encounter department: [facility]  :  Assessment & Plan  Vaginal irritation  Molecular vaginal panel collected  Comprehensive vaginal culture collected  Will treat based on results  Encouraged to remain off estrogen cream until official diagnosis/POC for breast cancer is developed  Reviewed vaginal hydration with coconut oil or vaginal moisturizers  Hygiene reviewed including avoid scented soaps, lotions and lubricants; avoid tight/restrictive clothing; avoid douching  Signs and symptoms to report reviewed   Will call/Phizzbohart message with results      Orders:    Molecular Vaginal Panel    Genital Comprehensive Culture    Vaginal odor    Orders:    Molecular Vaginal Panel    Genital Comprehensive Culture    RTO PRN      History of Present Illness   HPI  Enma Chauhan is a 69 y.o. female who presents with complaints of Vaginal discharge, irritation and odor  for 3 weeks.  LMP - s/p hysterectomy  Since onset symptoms have remained the same. discharge is described as yellow, thick and thin. Associated symptoms include internal vaginal burning, external vaginal itching, sour milk/ fishy odor.  Denies alleviating or aggravating factors. Tried OTC monistat 3 day and 7 days of boric acid without significant improvement. similar episodes in the past treated for vaginal yeast infection.  Denies fever, chills, bowel or bladder concerns.  Has not been using estrogen cream due to recent diagnosis of breast cancer.    Last Pap smear-prior to hysterectomy  Last mammogram 3/26/24-left BI-RADS 0 and right BI-RADS 1 status post left breast biopsy diagnosing ductal carcinoma.  Awaiting MRI biopsy pathology.  Had diagnostic bilateral mammogram yesterday.  CRC screen 4/15/24 colonoscopy.  Due for repeat in 5 years    History obtained from: patient    Review of  Systems   Constitutional:  Negative for chills and fever.   Respiratory: Negative.     Cardiovascular: Negative.    Genitourinary:  Positive for vaginal discharge. Negative for decreased urine volume, difficulty urinating, dyspareunia, dysuria, enuresis, flank pain, frequency, genital sores, hematuria, menstrual problem, pelvic pain, urgency, vaginal bleeding and vaginal pain.     Medical History Reviewed by provider this encounter:  Allergies  Meds     .     Objective   /80   Ht 5' (1.524 m)   Wt 82.6 kg (182 lb)   LMP  (LMP Unknown)   BMI 35.54 kg/m²      Physical Exam  Vitals reviewed. Exam conducted with a chaperone present.   Constitutional:       Appearance: Normal appearance.   Abdominal:      Hernia: There is no hernia in the left inguinal area or right inguinal area.   Genitourinary:     General: Normal vulva.      Exam position: Lithotomy position.      Labia:         Right: No rash, tenderness, lesion or injury.         Left: No rash, tenderness, lesion or injury.       Vagina: Vaginal discharge present.      Comments: Small to moderate amount of thin off-white vaginal discharge noted on exam.  Vaginal cuff intact.  Lymphadenopathy:      Lower Body: No right inguinal adenopathy. No left inguinal adenopathy.   Neurological:      Mental Status: She is alert and oriented to person, place, and time.   Psychiatric:         Mood and Affect: Mood normal.         Behavior: Behavior normal.

## 2025-01-28 ENCOUNTER — PATIENT OUTREACH (OUTPATIENT)
Dept: HEMATOLOGY ONCOLOGY | Facility: CLINIC | Age: 70
End: 2025-01-28

## 2025-01-28 ENCOUNTER — OFFICE VISIT (OUTPATIENT)
Age: 70
End: 2025-01-28
Payer: COMMERCIAL

## 2025-01-28 ENCOUNTER — OFFICE VISIT (OUTPATIENT)
Dept: OBGYN CLINIC | Facility: CLINIC | Age: 70
End: 2025-01-28
Payer: COMMERCIAL

## 2025-01-28 VITALS
DIASTOLIC BLOOD PRESSURE: 74 MMHG | HEIGHT: 60 IN | WEIGHT: 183 LBS | OXYGEN SATURATION: 99 % | HEART RATE: 80 BPM | BODY MASS INDEX: 35.93 KG/M2 | SYSTOLIC BLOOD PRESSURE: 114 MMHG | TEMPERATURE: 98.9 F

## 2025-01-28 VITALS
DIASTOLIC BLOOD PRESSURE: 80 MMHG | SYSTOLIC BLOOD PRESSURE: 114 MMHG | HEIGHT: 60 IN | BODY MASS INDEX: 35.73 KG/M2 | WEIGHT: 182 LBS

## 2025-01-28 DIAGNOSIS — N60.92 ATYPICAL DUCTAL HYPERPLASIA OF LEFT BREAST: ICD-10-CM

## 2025-01-28 DIAGNOSIS — Z80.9 FAMILY HISTORY OF CANCER: ICD-10-CM

## 2025-01-28 DIAGNOSIS — D05.12 DUCTAL CARCINOMA IN SITU (DCIS) OF LEFT BREAST: Primary | ICD-10-CM

## 2025-01-28 DIAGNOSIS — R92.8 ABNORMAL MRI, BREAST: ICD-10-CM

## 2025-01-28 DIAGNOSIS — N60.92 ATYPICAL LOBULAR HYPERPLASIA (ALH) OF LEFT BREAST: ICD-10-CM

## 2025-01-28 DIAGNOSIS — N89.8 VAGINAL IRRITATION: Primary | ICD-10-CM

## 2025-01-28 DIAGNOSIS — N89.8 VAGINAL ODOR: ICD-10-CM

## 2025-01-28 PROCEDURE — 99213 OFFICE O/P EST LOW 20 MIN: CPT | Performed by: NURSE PRACTITIONER

## 2025-01-28 PROCEDURE — 87070 CULTURE OTHR SPECIMN AEROBIC: CPT | Performed by: NURSE PRACTITIONER

## 2025-01-28 PROCEDURE — 81514 NFCT DS BV&VAGINITIS DNA ALG: CPT | Performed by: NURSE PRACTITIONER

## 2025-01-28 PROCEDURE — 99215 OFFICE O/P EST HI 40 MIN: CPT | Performed by: SURGERY

## 2025-01-28 NOTE — PROGRESS NOTES
Breast Oncology Patient Navigator    Met with patient at her follow up appointment with .     Offered support and answered questions when appropriate.    Patient given my  team's business card.  Patient has my contact information and knows to reach out with further questions or concerns.    Wanted to introduce myself in person to patient as we spoke on the phone recently.

## 2025-01-29 ENCOUNTER — RESULTS FOLLOW-UP (OUTPATIENT)
Dept: OBGYN CLINIC | Facility: MEDICAL CENTER | Age: 70
End: 2025-01-29

## 2025-01-29 DIAGNOSIS — B37.9 CANDIDA INFECTION: Primary | ICD-10-CM

## 2025-01-29 LAB
C GLABRATA DNA VAG QL NAA+PROBE: NEGATIVE
C KRUSEI DNA VAG QL NAA+PROBE: POSITIVE
CANDIDA SP 6 PNL VAG NAA+PROBE: NEGATIVE
T VAGINALIS DNA VAG QL NAA+PROBE: NEGATIVE
VAGINOSIS/ITIS DNA PNL VAG PROBE+SIG AMP: NEGATIVE

## 2025-01-29 RX ORDER — MICONAZOLE NITRATE 2 %
1 CREAM WITH APPLICATOR VAGINAL
Qty: 45 G | Refills: 0 | Status: SHIPPED | OUTPATIENT
Start: 2025-01-29 | End: 2025-02-12

## 2025-01-30 LAB
GENE DIS ANL INTERP-IMP: NORMAL
GENES NOT REPORTED: NORMAL
INTERPRETATION: NORMAL

## 2025-01-31 ENCOUNTER — RESULTS FOLLOW-UP (OUTPATIENT)
Dept: GENETICS | Facility: CLINIC | Age: 70
End: 2025-01-31

## 2025-01-31 LAB
BACTERIA GENITAL AEROBE CULT: ABNORMAL
BACTERIA GENITAL AEROBE CULT: ABNORMAL

## 2025-02-02 LAB
GENE DIS ANL INTERP-IMP: NORMAL
INTERPRETATION: NORMAL

## 2025-02-03 ENCOUNTER — DOCUMENTATION (OUTPATIENT)
Dept: HEMATOLOGY ONCOLOGY | Facility: CLINIC | Age: 70
End: 2025-02-03

## 2025-02-03 ENCOUNTER — APPOINTMENT (OUTPATIENT)
Dept: LAB | Facility: MEDICAL CENTER | Age: 70
End: 2025-02-03
Payer: COMMERCIAL

## 2025-02-03 ENCOUNTER — RESULTS FOLLOW-UP (OUTPATIENT)
Dept: SURGERY | Facility: CLINIC | Age: 70
End: 2025-02-03

## 2025-02-03 ENCOUNTER — DOCUMENTATION (OUTPATIENT)
Dept: OBGYN CLINIC | Facility: MEDICAL CENTER | Age: 70
End: 2025-02-03

## 2025-02-03 ENCOUNTER — PATIENT OUTREACH (OUTPATIENT)
Dept: CASE MANAGEMENT | Facility: OTHER | Age: 70
End: 2025-02-03

## 2025-02-03 DIAGNOSIS — N60.92 ATYPICAL LOBULAR HYPERPLASIA (ALH) OF LEFT BREAST: ICD-10-CM

## 2025-02-03 DIAGNOSIS — Z80.9 FAMILY HISTORY OF CANCER: ICD-10-CM

## 2025-02-03 DIAGNOSIS — R92.8 ABNORMAL MRI, BREAST: ICD-10-CM

## 2025-02-03 DIAGNOSIS — D05.12 DUCTAL CARCINOMA IN SITU (DCIS) OF LEFT BREAST: ICD-10-CM

## 2025-02-03 LAB
ALBUMIN SERPL BCG-MCNC: 4.3 G/DL (ref 3.5–5)
ALP SERPL-CCNC: 78 U/L (ref 34–104)
ALT SERPL W P-5'-P-CCNC: 14 U/L (ref 7–52)
ANION GAP SERPL CALCULATED.3IONS-SCNC: 8 MMOL/L (ref 4–13)
AST SERPL W P-5'-P-CCNC: 14 U/L (ref 13–39)
ATRIAL RATE: 78 BPM
BASOPHILS # BLD AUTO: 0.04 THOUSANDS/ΜL (ref 0–0.1)
BASOPHILS NFR BLD AUTO: 1 % (ref 0–1)
BILIRUB SERPL-MCNC: 0.27 MG/DL (ref 0.2–1)
BUN SERPL-MCNC: 16 MG/DL (ref 5–25)
CALCIUM SERPL-MCNC: 9.4 MG/DL (ref 8.4–10.2)
CHLORIDE SERPL-SCNC: 105 MMOL/L (ref 96–108)
CO2 SERPL-SCNC: 29 MMOL/L (ref 21–32)
CREAT SERPL-MCNC: 0.73 MG/DL (ref 0.6–1.3)
EOSINOPHIL # BLD AUTO: 0.26 THOUSAND/ΜL (ref 0–0.61)
EOSINOPHIL NFR BLD AUTO: 4 % (ref 0–6)
ERYTHROCYTE [DISTWIDTH] IN BLOOD BY AUTOMATED COUNT: 15.6 % (ref 11.6–15.1)
GFR SERPL CREATININE-BSD FRML MDRD: 83 ML/MIN/1.73SQ M
GLUCOSE P FAST SERPL-MCNC: 99 MG/DL (ref 65–99)
HCT VFR BLD AUTO: 43 % (ref 34.8–46.1)
HGB BLD-MCNC: 13.4 G/DL (ref 11.5–15.4)
IMM GRANULOCYTES # BLD AUTO: 0.02 THOUSAND/UL (ref 0–0.2)
IMM GRANULOCYTES NFR BLD AUTO: 0 % (ref 0–2)
LYMPHOCYTES # BLD AUTO: 1.65 THOUSANDS/ΜL (ref 0.6–4.47)
LYMPHOCYTES NFR BLD AUTO: 26 % (ref 14–44)
MCH RBC QN AUTO: 27 PG (ref 26.8–34.3)
MCHC RBC AUTO-ENTMCNC: 31.2 G/DL (ref 31.4–37.4)
MCV RBC AUTO: 87 FL (ref 82–98)
MONOCYTES # BLD AUTO: 0.8 THOUSAND/ΜL (ref 0.17–1.22)
MONOCYTES NFR BLD AUTO: 13 % (ref 4–12)
NEUTROPHILS # BLD AUTO: 3.5 THOUSANDS/ΜL (ref 1.85–7.62)
NEUTS SEG NFR BLD AUTO: 56 % (ref 43–75)
NRBC BLD AUTO-RTO: 0 /100 WBCS
P AXIS: 62 DEGREES
PLATELET # BLD AUTO: 277 THOUSANDS/UL (ref 149–390)
PMV BLD AUTO: 9.9 FL (ref 8.9–12.7)
POTASSIUM SERPL-SCNC: 3.7 MMOL/L (ref 3.5–5.3)
PR INTERVAL: 180 MS
PROT SERPL-MCNC: 7.2 G/DL (ref 6.4–8.4)
QRS AXIS: 2 DEGREES
QRSD INTERVAL: 92 MS
QT INTERVAL: 380 MS
QTC INTERVAL: 433 MS
RBC # BLD AUTO: 4.96 MILLION/UL (ref 3.81–5.12)
SODIUM SERPL-SCNC: 142 MMOL/L (ref 135–147)
T WAVE AXIS: 46 DEGREES
VENTRICULAR RATE: 78 BPM
WBC # BLD AUTO: 6.27 THOUSAND/UL (ref 4.31–10.16)

## 2025-02-03 PROCEDURE — 93010 ELECTROCARDIOGRAM REPORT: CPT | Performed by: INTERNAL MEDICINE

## 2025-02-03 PROCEDURE — 80053 COMPREHEN METABOLIC PANEL: CPT

## 2025-02-03 PROCEDURE — 36415 COLL VENOUS BLD VENIPUNCTURE: CPT

## 2025-02-03 PROCEDURE — 85025 COMPLETE CBC W/AUTO DIFF WBC: CPT

## 2025-02-03 NOTE — PROGRESS NOTES
Talked with Enma and explained to her that the diflucan wont help treat candida krusei. To use the monistat nightly for 14 days. Patient voiced understanding.

## 2025-02-03 NOTE — PROGRESS NOTES
Pt came into [place] office and is requesting Diflucan for current infection, says monistat isnt helping.

## 2025-02-03 NOTE — PROGRESS NOTES
In-basket message received from Dr. Campbell to add patient to the breast MDCC on 2/10/2025. Chart reviewed and prep completed.

## 2025-02-03 NOTE — RESULT ENCOUNTER NOTE
Please update the patient. I was happy to see that her genetics are negative. The MRI biopsies x 2 show atypical cells, no cancer. I would like to review her case at our upcoming tumor board on Monday to see if there is a breast conservation option for her (lumpectomy focused) or if mastectomy is needed due to the 3 areas of atypical cells (1 from prior, 2 now) and the small area of breast cancer. We will contact her on Monday with further input.     I did hear from an outside plastic surgeon Dr. Ramirez who she saw in consult who wanted to discuss management plans as well. We will [place] back on that once I determine the tumor board recommendation. I am not clear if he is able to operate at [facility] or if a [facility] plastic surgeon would have to be involved in the case, will review this in more detail once the resection plan is made.

## 2025-02-04 ENCOUNTER — TELEPHONE (OUTPATIENT)
Age: 70
End: 2025-02-04

## 2025-02-04 PROBLEM — R92.8 ABNORMAL MRI, BREAST: Status: ACTIVE | Noted: 2025-02-04

## 2025-02-04 PROBLEM — N60.92 ATYPICAL DUCTAL HYPERPLASIA OF LEFT BREAST: Status: ACTIVE | Noted: 2025-02-04

## 2025-02-04 PROBLEM — D05.12 DUCTAL CARCINOMA IN SITU (DCIS) OF LEFT BREAST: Status: ACTIVE | Noted: 2025-02-04

## 2025-02-04 NOTE — TELEPHONE ENCOUNTER
Please contact Dr. Ramirez in regards to this patient. The provider can be reached at [phone].  is needing to discuss a case with you regarding pt surgery.

## 2025-02-04 NOTE — TELEPHONE ENCOUNTER
Spoke with Dr. Ramirez, plastic surgery, will update him again on care plan after tumor board and patient discussion next week.

## 2025-02-05 NOTE — PROGRESS NOTES
Progress Note - Surgical Oncology  Enma Chauhan 69 y.o. female MRN: [id number]  Encounter: [id number]    Assessment & Plan     69F with family history of premenopausal breast cancer, personal history of left breast atypical lobular hyperplasia (standard clip, 12 oclock) in the setting of evolving calcifications seen on screening mammogram in 2024. We opted for close surveillance initially in her case but did discuss the role of surgical excisional biopsy to rule out upgrade to malignancy.     We initiated high risk screening and her first breast MRI recently resulted with extensive bilateral nodular enhancement throughout both breasts with second look US showing a 9mm mass at 3oclock, no major US correlate for some of the higher risk findings on the MRI. An MRI biopsy for the 2 oclock 6mm mass with rapid enhancement on MRI was recommended. The MRI guided biopsy showed DCIS, grade 1, ER/MS+. Additional 2 site MRI biopsies recommended and completed. Results were not available at the time of the visit but have since resulted showing atypical ductal hyperplasia at both 3 oclock and 5 o clock. Genetic testing was pending at the time of our visit but has since resulted as negative.    We reviewed the diagnosis of DCIS in detail at her visit and discussed the modern, individualized, multidisciplinary approach to breast cancer care. We reviewed her specific case, I gave her written handouts and the breast cancer handbook, all questions were answered. She will stop her vaginal estrace cream. She communicated that she strongly prefers a breast conservation approach if she is a candidate for one. She is also interesting in having plastic surgery consult on her case once the MRI biopsy and genetics results are back as she may want to involve them in her case, even with breast conservation in order to optimize symmetry and breast contour. She declined having me place a referral or schedule the consult at the time of the  visit.     We planned to [place] back together on the surgical decision making and coordination once the genetics and MRI biopsies resulted. Now that they are back, we are going to discuss her case on Monday 2/10 in our tumor board regarding her 3 sites of atypia and one site of DCIS and the surgical approach, localization options, the background nodular enhancement that was seen on MRI bilaterally, and the best path forward. She has standard clips at all of the prior biopsy sites x 4. Updated labs and EKG have been ordered.    She did meet with Dr. Ramirez, private practice plastic surgery after our visit together to learn about oncoplastic techniques, reconstruction techniques, the role of plastic surgery in breast cancer care. I discussed her case briefly with him and he is aware that we are awaiting tumor board discussion to finalize our surgical plan.    Subjective         Started high risk breast cancer screening. Recent MRI was her first and showed bilateral breast nodular enhancement. She has left sided DCIS, grade 1, ER/VA+ at the 2 oclock site at the location of a 6mm mass. She has ADH x 2 at 3 oclock and 5 oclock sites. ALH at 12 oclock. Updated mammogram without other findings. Axilla negative on MRI. Genetics sent and resulted after the visit as negative. Patient strongly prefers breast conservation options, would be interested in meeting with a plastic surgeon but was not ready to arrange that at the time of our visit. She plans to tell her family about the diagnosis after today's visit now that she is more informed.       Review of Systems   Constitutional: Negative.    Skin: Negative.    Hematological: Negative.    All other systems reviewed and are negative.      The following portions of the patient's history were reviewed and updated as appropriate: allergies, current medications, past family history, past medical history, past social history, past surgical history, and problem list.    Objective       Blood pressure 114/74, pulse 80, temperature 98.9 °F (37.2 °C), temperature source Tympanic, height 5' (1.524 m), weight 83 kg (183 lb), SpO2 99%, not currently breastfeeding.   Physical Exam  Vitals reviewed.   Constitutional:       General: She is not in acute distress.     Appearance: She is not toxic-appearing.   HENT:      Head: Normocephalic.      Nose: No congestion.      Mouth/Throat:      Mouth: Mucous membranes are moist.   Eyes:      Pupils: Pupils are equal, round, and reactive to light.   Cardiovascular:      Rate and Rhythm: Normal rate.   Pulmonary:      Effort: Pulmonary effort is normal. No respiratory distress.   Abdominal:      Palpations: Abdomen is soft.   Musculoskeletal:         General: Normal range of motion.      Cervical back: Normal range of motion.   Skin:     General: Skin is warm.      Capillary Refill: Capillary refill takes less than 2 seconds.   Neurological:      General: No focal deficit present.      Mental Status: She is alert. Mental status is at baseline.   Psychiatric:         Mood and Affect: Mood normal.     The patient has no palpable cervical, supraclavicular, or axillary lymphadenopathy bilaterally.  The breasts are symmetric. There is some ecchymoses and resolving biopsy related hematoma on the left.  There are otherwise no dominant lumps, masses, skin changes or nipple retraction bilaterally.     I have spent a total time of 40 minutes in caring for this patient on the day of the visit/encounter including Diagnostic results, Prognosis, Risks and benefits of tx options, Instructions for management, Patient and family education, Risk factor reductions, Impressions, Counseling / Coordination of care, Reviewing / ordering tests, medicine, procedures  , Obtaining or reviewing history  , and Communicating with other healthcare professionals .   Signature:  Emmanuel Campbell MD  Date: 2/4/2025 Time: 8:43 PM

## 2025-02-10 ENCOUNTER — PATIENT OUTREACH (OUTPATIENT)
Dept: CASE MANAGEMENT | Facility: OTHER | Age: 70
End: 2025-02-10

## 2025-02-10 ENCOUNTER — DOCUMENTATION (OUTPATIENT)
Dept: HEMATOLOGY ONCOLOGY | Facility: CLINIC | Age: 70
End: 2025-02-10

## 2025-02-10 NOTE — PROGRESS NOTES
BREAST CANCER MULTIDISCIPLINARY CASE CONFERENCE    DATE: 2/10/2025      PRESENTING DOCTOR: Dr. Emmanuel Campbell  OTHER RELEVANT PROVIDERS:      DIAGNOSIS: Left breast DCIS, ER+, OH+ and multifocal breast atypia with multiple background breast findings        Enma Chauhan is a 69 y.o. female who was presented at the Breast Multidisciplinary Case Conference today to review breast imaging and discuss breast conservation options vs mastectomy.      GENETICS: 1/22/2025 [facility], no clinically significant variants detected      GENOMICS: N/A      IMAGING REVIEWED:   9/23/2024 MRI breast bilateral  1/24/2025 MRI left breast biopsy  1/27/2025 Bilateral diagnostic mammogram      PATHOLOGY REVIEWED: none      PHYSICIAN RECOMMENDED PLAN:  Recommend left breast lumpectomy, excision of DCIS  Recommend AI therapy after surgery  Consider radiation therapy  Recommend continuing high risk breast screening        FOLLOW UP APPOINTMENTS:    No future appointments.     Team agreed to plan.    The final treatment plan will be left to the discretion of the patient and the treating physician.     DISCLAIMERS:  TO THE TREATING PHYSICIAN:  This conference is a meeting of clinicians from various specialty areas who evaluate and discuss patients for whom a multidisciplinary treatment approach is being considered. Please note that the above opinion was a consensus of the conference attendees and is intended only to assist in quality care of the discussed patient.  The responsibility for follow up on the input given during the conference, along with any final decisions regarding plan of care, is that of the patient and the patient's provider. Accordingly, appointments have only been recommended based on this information and have NOT been scheduled unless otherwise noted.      TO THE PATIENT:  This summary is a brief record of major aspects of your cancer treatment. You may choose to share a copy with any of your doctors or nurses. However,  this is not a detailed or comprehensive record of your care.      NCCN guidelines were readily available for review at this discussion

## 2025-02-10 NOTE — PROGRESS NOTES
Biopsychosocial and Barriers Assessment    Type of Cancer: Breast  Treatment plan: TBD, being presented to tumor board  Noted barriers to care: denies   Cultural/Rastafari concerns: denies    Hair Loss/ Wig resources needed: denies     DT completed: yes  DT score: 7/10  Issues noted: change in appearance, feeling worthless/burden, taking care of others, insurance     Marital status/Lives with:  to [employer]  's support system: patient reports that she has a good support system  Mental Health history: denies   Substance Abuse: denies     Employment/income source: retired   Concerns with bills (treatment vs household): denies   Noted issues with home: denies     Narrative note:   OSW outreached to patient. Patient reported that she did complete genetic testing and would like to sit with the genetics team to discuss her results and ask questions. Would like OSW to outreach to genetics team to request appointment for follow up appointment with them. OSW will send message.     Patient denies additional concerns for OSW stated that she has [facility]W contact information and will call if needed.

## 2025-02-12 ENCOUNTER — TELEPHONE (OUTPATIENT)
Dept: SURGERY | Facility: CLINIC | Age: 70
End: 2025-02-12

## 2025-02-12 NOTE — TELEPHONE ENCOUNTER
Placed call to patient and left a message to return our call.    Want to ask the patient if we can schedule her a virtual visit with Dr. Campbell today to discuss tumor board recommendations and next steps.

## 2025-02-13 ENCOUNTER — TELEPHONE (OUTPATIENT)
Dept: SURGERY | Facility: CLINIC | Age: 70
End: 2025-02-13

## 2025-02-13 ENCOUNTER — DOCUMENTATION (OUTPATIENT)
Dept: HEMATOLOGY ONCOLOGY | Facility: CLINIC | Age: 70
End: 2025-02-13

## 2025-02-13 ENCOUNTER — OFFICE VISIT (OUTPATIENT)
Dept: GENETICS | Facility: CLINIC | Age: 70
End: 2025-02-13

## 2025-02-13 DIAGNOSIS — D05.12 DUCTAL CARCINOMA IN SITU (DCIS) OF LEFT BREAST: Primary | ICD-10-CM

## 2025-02-13 NOTE — PROGRESS NOTES
Post-Test Genetic Counseling Consult Note    Patient Name: Enma Chauhan   /Age: 1955/69 y.o.    Date of Service: 2025  Genetic Counselor: Brittani Peterson MS, [facility]  Interpretation Services: None  Location: In-person consult at [facility]  Length of Visit:  10 Minutes    Enma was referred to the [facility] Cancer Risk and Genetic Assessment Program to discuss her genetic test result.    Enma was diagnosed with left DCIS and multifocal atypia earlier this year. She underwent genetic testing for hereditary cancer to better inform her surgical decision making.     Genetic Testing History:     Report Date: 2025     Test: [employer] BRCAplus STAT Panel (13 genes): JOIE, BARD1, BRCA1, BRCA2, CDH1, CHEK2, NF1, PALB2, PTEN, RAD51C, RAD51D, STK11, TP53 with reflex to [employer] CustomNext: Cancer+RNAinsight (46 genes): APC, AXIN2, BAP1, BMPR1A, BRIP1, CDK4, CDKN1B, CDKN2A, CTNNA1, DICER1, EGLN1, EPCAM, FH, FLCN, GREM1, HOXB13, KIF1B, KIT, MAX, MEN1, MET, MITF, MLH1, MSH2, MSH3, MSH6, MUTYH, NTHL1, PDGFRA PMS2, POLD1, POLE, POT1, RB1, RET, SDHA, SDHAF2, SDHB, SDHC, SDHD, SMAD4, SMARCA4, OVLU354, TSC1, TSC2, VHL      Result: Negative    Medical and Surgical History  Pertinent surgical history:   Past Surgical History:   Procedure Laterality Date    BREAST CYST ASPIRATION Left     2019     SECTION      x3:  10/30/1977(F), 1980 (F), 1981 (M)    COLONOSCOPY      HYSTERECTOMY N/A     age 44    LEG SURGERY Right     fracture    MAMMO STEREOTACTIC BREAST BIOPSY LEFT (ALL INC) Left 2024    MRI BREAST BIOPSY LEFT (ALL INCLUSIVE) Left 01/10/2025    MRI BREAST BIOPSY LEFT (ALL INCLUSIVE) Left 2025      Pertinent medical history:  Past Medical History:   Diagnosis Date    Breast cancer (HCC) 01/10/2025    left DCIS    Hyperlipidemia     Kidney stone     Tibia/fibula fracture 2016    ORIF         Other History:  Height:   Ht Readings from Last 1 Encounters:   25 5' (1.524 m)      Weight:   Wt Readings from Last 1 Encounters:   01/28/25 83 kg (183 lb)     Assessment:   We reviewed that a negative result significantly reduces the likelihood that Enma has a hereditary cancer syndrome. However, this testing is unable to completely rule out the presence of hereditary cancer. It remains possible that:  There is a variant in an area of a gene which was not tested or there is a variant not detectable due to technical limitations of this test.     There is a variant in another gene that was not included in this test or in a gene not known to be linked to cancer or tumors.   A family member has a genetic variant that the patient did not inherit.   The cancer in the family is sporadic and is related to non-hereditary factors.     Risks and Testing for Family Members:  Enma was made aware that all of her first-degree relatives are at increased risk to develop breast cancer based on her recent diagnosis and family history of breast cancer. Her family members may also be at increased risk to develop other cancers in her family history, specifically prostate cancer. We recommend that her first-degree relatives make their healthcare providers aware of the family history and discuss their options for screening and risk-reduction.    At this time we do not recommend testing for Enma 's children based on her negative test result.  Enma 's children still need to consider the history of cancer on the other side of their family when determining their risks.     If Enma has any affected family members with a cancer diagnosis, especially at a young age, they may still consider genetic testing. Relatives who wish to pursue genetic testing can reach out to the Cancer Risk and Genetics Program at [phone] to schedule an appointment or visit [email or web address] to identify a local genetic counselor.      Negative Result: This result does not have surgical implications and surgical options  should be discussed with her healthcare provider.

## 2025-02-19 ENCOUNTER — TELEMEDICINE (OUTPATIENT)
Dept: SURGERY | Facility: CLINIC | Age: 70
End: 2025-02-19
Payer: COMMERCIAL

## 2025-02-19 DIAGNOSIS — D05.12 DUCTAL CARCINOMA IN SITU (DCIS) OF LEFT BREAST: Primary | ICD-10-CM

## 2025-02-19 PROCEDURE — 99214 OFFICE O/P EST MOD 30 MIN: CPT | Performed by: SURGERY

## 2025-02-20 DIAGNOSIS — E78.00 HYPERCHOLESTEROLEMIA: ICD-10-CM

## 2025-02-24 RX ORDER — EZETIMIBE 10 MG/1
10 TABLET ORAL DAILY
Qty: 30 TABLET | Refills: 11 | Status: SHIPPED | OUTPATIENT
Start: 2025-02-24

## 2025-02-25 ENCOUNTER — OFFICE VISIT (OUTPATIENT)
Dept: FAMILY MEDICINE CLINIC | Facility: CLINIC | Age: 70
End: 2025-02-25
Payer: COMMERCIAL

## 2025-02-25 VITALS
SYSTOLIC BLOOD PRESSURE: 124 MMHG | DIASTOLIC BLOOD PRESSURE: 70 MMHG | BODY MASS INDEX: 35 KG/M2 | TEMPERATURE: 97.8 F | HEART RATE: 73 BPM | OXYGEN SATURATION: 97 % | WEIGHT: 179.2 LBS

## 2025-02-25 DIAGNOSIS — J45.40 MODERATE PERSISTENT ASTHMA WITHOUT COMPLICATION: ICD-10-CM

## 2025-02-25 DIAGNOSIS — Z01.818 PREOPERATIVE EXAMINATION: ICD-10-CM

## 2025-02-25 DIAGNOSIS — J40 BRONCHITIS: Primary | ICD-10-CM

## 2025-02-25 PROCEDURE — 99214 OFFICE O/P EST MOD 30 MIN: CPT

## 2025-02-25 PROCEDURE — G2211 COMPLEX E/M VISIT ADD ON: HCPCS

## 2025-02-25 RX ORDER — ALBUTEROL SULFATE 90 UG/1
2 INHALANT RESPIRATORY (INHALATION) EVERY 6 HOURS PRN
Qty: 8 G | Refills: 2 | Status: SHIPPED | OUTPATIENT
Start: 2025-02-25

## 2025-02-25 RX ORDER — BENZONATATE 200 MG/1
200 CAPSULE ORAL 3 TIMES DAILY PRN
Qty: 20 CAPSULE | Refills: 0 | Status: SHIPPED | OUTPATIENT
Start: 2025-02-25

## 2025-02-25 RX ORDER — DOXYCYCLINE HYCLATE 100 MG
100 TABLET ORAL 2 TIMES DAILY
Qty: 14 TABLET | Refills: 0 | Status: SHIPPED | OUTPATIENT
Start: 2025-02-25 | End: 2025-03-04

## 2025-02-25 RX ORDER — FLUTICASONE PROPIONATE AND SALMETEROL 100; 50 UG/1; UG/1
1 POWDER RESPIRATORY (INHALATION) 2 TIMES DAILY
Qty: 60 BLISTER | Refills: 5 | Status: SHIPPED | OUTPATIENT
Start: 2025-02-25 | End: 2025-03-27

## 2025-02-25 NOTE — ASSESSMENT & PLAN NOTE
Patient was symptoms consistent with likely bronchitis, has been sick for over a week with cough which is causing sore throat.  Also reports fever at nighttime.  With patient's asthma history, concerned about possible bacterial bronchitis, will start treatment with doxycycline twice daily x 7 days which she has done well with in the past.  Counseled on continuing Mucinex or Robitussin throughout the day but can use Tessalon Perles at nighttime to help with sleep.  Refills provided of her inhalers as these will be important to start treatment with with her current infection.  She is agreeable with plan today, will be returning to care in about 1 week for preop clearance and we will determine if she is optimized prior to surgery.  She is agreeable with plan today, will call with any issues in the meantime.  Orders:  •  doxycycline hyclate (VIBRA-TABS) 100 mg tablet; Take 1 tablet (100 mg total) by mouth 2 (two) times a day for 7 days  •  benzonatate (TESSALON) 200 MG capsule; Take 1 capsule (200 mg total) by mouth 3 (three) times a day as needed for cough

## 2025-02-25 NOTE — PROGRESS NOTES
Name: Enma Chauhan      : 1955      MRN: [id number]  Encounter Provider: Kavya Crystal PA-C  Encounter Date: 2025   Encounter department: [facility]  :  Assessment & Plan  Bronchitis  Patient was symptoms consistent with likely bronchitis, has been sick for over a week with cough which is causing sore throat.  Also reports fever at nighttime.  With patient's asthma history, concerned about possible bacterial bronchitis, will start treatment with doxycycline twice daily x 7 days which she has done well with in the past.  Counseled on continuing Mucinex or Robitussin throughout the day but can use Tessalon Perles at nighttime to help with sleep.  Refills provided of her inhalers as these will be important to start treatment with with her current infection.  She is agreeable with plan today, will be returning to care in about 1 week for preop clearance and we will determine if she is optimized prior to surgery.  She is agreeable with plan today, will call with any issues in the meantime.  Orders:  •  doxycycline hyclate (VIBRA-TABS) 100 mg tablet; Take 1 tablet (100 mg total) by mouth 2 (two) times a day for 7 days  •  benzonatate (TESSALON) 200 MG capsule; Take 1 capsule (200 mg total) by mouth 3 (three) times a day as needed for cough    Moderate persistent asthma without complication    Orders:  •  albuterol (PROVENTIL HFA,VENTOLIN HFA) 90 mcg/act inhaler; Inhale 2 puffs every 6 (six) hours as needed for wheezing (cough)  •  Fluticasone-Salmeterol (Advair Diskus) 100-50 mcg/dose inhaler; Inhale 1 puff 2 (two) times a day Rinse mouth after use.    Preoperative examination  Patient return next week for preop clearance, will place CBC and CMP to get completed prior to next week so we have all preop testing available for us.  Orders:  •  CBC and differential; Future  •  Comprehensive metabolic panel; Future           History of Present Illness   Patient presents today for  evaluation of a cough, sore throat and fever which has been present for the past week and is worsening.  She reports cough is productive.  She reports she has tried Mucinex for symptoms which has not fully improved her symptoms.  She is trying to remain well-hydrated.  She has surgery planned in the next few weeks.  She has been of her inhalers so she has not been taking them.    Review of Systems   Constitutional:  Positive for fever. Negative for chills and fatigue.   HENT:  Positive for sore throat. Negative for congestion, ear discharge, ear pain, postnasal drip, sinus pressure and sinus pain.    Respiratory:  Positive for cough. Negative for chest tightness and shortness of breath.    Cardiovascular:  Negative for chest pain, palpitations and leg swelling.   Neurological:  Negative for dizziness, light-headedness and headaches.       Objective   /70 (BP Location: Left arm, Patient Position: Sitting, Cuff Size: Large)   Pulse 73   Temp 97.8 °F (36.6 °C) (Temporal)   Wt 81.3 kg (179 lb 3.2 oz)   LMP  (LMP Unknown)   SpO2 97%   BMI 35.00 kg/m²      Physical Exam  Vitals and nursing note reviewed.   Constitutional:       General: She is not in acute distress.     Appearance: Normal appearance. She is normal weight. She is ill-appearing.   HENT:      Head: Normocephalic and atraumatic.      Right Ear: Tympanic membrane normal.      Left Ear: Tympanic membrane normal.      Nose: Congestion present.      Mouth/Throat:      Mouth: Mucous membranes are moist.      Pharynx: Oropharynx is clear. No oropharyngeal exudate or posterior oropharyngeal erythema.   Cardiovascular:      Rate and Rhythm: Normal rate and regular rhythm.      Heart sounds: No murmur heard.  Pulmonary:      Effort: Pulmonary effort is normal. No respiratory distress.      Breath sounds: Normal breath sounds. No wheezing or rhonchi.      Comments: Tightness heard with deep inspiration in upper lung fields, but no wheezing or  rhonchi.  Musculoskeletal:      Cervical back: Normal range of motion.      Right lower leg: No edema.      Left lower leg: No edema.   Lymphadenopathy:      Cervical: No cervical adenopathy.   Skin:     General: Skin is warm and dry.      Coloration: Skin is not cyanotic or pale.   Neurological:      General: No focal deficit present.      Mental Status: She is alert and oriented to person, place, and time. Mental status is at baseline.   Psychiatric:         Mood and Affect: Mood normal.         Behavior: Behavior normal.         Judgment: Judgment normal.

## 2025-02-27 ENCOUNTER — DOCUMENTATION (OUTPATIENT)
Dept: HEMATOLOGY ONCOLOGY | Facility: CLINIC | Age: 70
End: 2025-02-27

## 2025-02-27 NOTE — PROGRESS NOTES
Looks like patient is continuing her care at [facility] patient is scheduled for surgery on 3/11/2025.

## 2025-03-03 ENCOUNTER — OFFICE VISIT (OUTPATIENT)
Dept: FAMILY MEDICINE CLINIC | Facility: CLINIC | Age: 70
End: 2025-03-03
Payer: COMMERCIAL

## 2025-03-03 ENCOUNTER — APPOINTMENT (OUTPATIENT)
Dept: LAB | Facility: CLINIC | Age: 70
End: 2025-03-03
Payer: COMMERCIAL

## 2025-03-03 ENCOUNTER — APPOINTMENT (OUTPATIENT)
Dept: RADIOLOGY | Facility: CLINIC | Age: 70
End: 2025-03-03
Payer: COMMERCIAL

## 2025-03-03 VITALS
DIASTOLIC BLOOD PRESSURE: 80 MMHG | OXYGEN SATURATION: 99 % | SYSTOLIC BLOOD PRESSURE: 114 MMHG | HEART RATE: 71 BPM | TEMPERATURE: 99.2 F

## 2025-03-03 DIAGNOSIS — J01.90 ACUTE NON-RECURRENT SINUSITIS, UNSPECIFIED LOCATION: Primary | ICD-10-CM

## 2025-03-03 DIAGNOSIS — Z01.818 PREOPERATIVE EXAMINATION: ICD-10-CM

## 2025-03-03 DIAGNOSIS — Z01.818 PREOPERATIVE CLEARANCE: ICD-10-CM

## 2025-03-03 LAB
ALBUMIN SERPL BCG-MCNC: 4.3 G/DL (ref 3.5–5)
ALP SERPL-CCNC: 87 U/L (ref 34–104)
ALT SERPL W P-5'-P-CCNC: 15 U/L (ref 7–52)
ANION GAP SERPL CALCULATED.3IONS-SCNC: 9 MMOL/L (ref 4–13)
AST SERPL W P-5'-P-CCNC: 16 U/L (ref 13–39)
BASOPHILS # BLD AUTO: 0.05 THOUSANDS/ÂΜL (ref 0–0.1)
BASOPHILS NFR BLD AUTO: 1 % (ref 0–1)
BILIRUB SERPL-MCNC: 0.42 MG/DL (ref 0.2–1)
BUN SERPL-MCNC: 15 MG/DL (ref 5–25)
CALCIUM SERPL-MCNC: 9.4 MG/DL (ref 8.4–10.2)
CHLORIDE SERPL-SCNC: 106 MMOL/L (ref 96–108)
CO2 SERPL-SCNC: 27 MMOL/L (ref 21–32)
CREAT SERPL-MCNC: 0.71 MG/DL (ref 0.6–1.3)
EOSINOPHIL # BLD AUTO: 0.3 THOUSAND/ÂΜL (ref 0–0.61)
EOSINOPHIL NFR BLD AUTO: 4 % (ref 0–6)
ERYTHROCYTE [DISTWIDTH] IN BLOOD BY AUTOMATED COUNT: 15.7 % (ref 11.6–15.1)
GFR SERPL CREATININE-BSD FRML MDRD: 86 ML/MIN/1.73SQ M
GLUCOSE P FAST SERPL-MCNC: 97 MG/DL (ref 65–99)
HCT VFR BLD AUTO: 42.1 % (ref 34.8–46.1)
HGB BLD-MCNC: 13 G/DL (ref 11.5–15.4)
IMM GRANULOCYTES # BLD AUTO: 0.03 THOUSAND/UL (ref 0–0.2)
IMM GRANULOCYTES NFR BLD AUTO: 0 % (ref 0–2)
LYMPHOCYTES # BLD AUTO: 2.12 THOUSANDS/ÂΜL (ref 0.6–4.47)
LYMPHOCYTES NFR BLD AUTO: 27 % (ref 14–44)
MCH RBC QN AUTO: 26.7 PG (ref 26.8–34.3)
MCHC RBC AUTO-ENTMCNC: 30.9 G/DL (ref 31.4–37.4)
MCV RBC AUTO: 86 FL (ref 82–98)
MONOCYTES # BLD AUTO: 0.9 THOUSAND/ÂΜL (ref 0.17–1.22)
MONOCYTES NFR BLD AUTO: 11 % (ref 4–12)
NEUTROPHILS # BLD AUTO: 4.61 THOUSANDS/ÂΜL (ref 1.85–7.62)
NEUTS SEG NFR BLD AUTO: 57 % (ref 43–75)
NRBC BLD AUTO-RTO: 0 /100 WBCS
PLATELET # BLD AUTO: 277 THOUSANDS/UL (ref 149–390)
PMV BLD AUTO: 10.2 FL (ref 8.9–12.7)
POTASSIUM SERPL-SCNC: 4.3 MMOL/L (ref 3.5–5.3)
PROT SERPL-MCNC: 7.2 G/DL (ref 6.4–8.4)
RBC # BLD AUTO: 4.87 MILLION/UL (ref 3.81–5.12)
SODIUM SERPL-SCNC: 142 MMOL/L (ref 135–147)
WBC # BLD AUTO: 8.01 THOUSAND/UL (ref 4.31–10.16)

## 2025-03-03 PROCEDURE — 71046 X-RAY EXAM CHEST 2 VIEWS: CPT

## 2025-03-03 PROCEDURE — 85025 COMPLETE CBC W/AUTO DIFF WBC: CPT

## 2025-03-03 PROCEDURE — 80053 COMPREHEN METABOLIC PANEL: CPT

## 2025-03-03 PROCEDURE — 99214 OFFICE O/P EST MOD 30 MIN: CPT

## 2025-03-03 PROCEDURE — 36415 COLL VENOUS BLD VENIPUNCTURE: CPT

## 2025-03-03 PROCEDURE — G2211 COMPLEX E/M VISIT ADD ON: HCPCS

## 2025-03-03 RX ORDER — LEVOFLOXACIN 500 MG/1
500 TABLET, FILM COATED ORAL EVERY 24 HOURS
Qty: 7 TABLET | Refills: 0 | Status: SHIPPED | OUTPATIENT
Start: 2025-03-03 | End: 2025-03-10

## 2025-03-03 NOTE — PROGRESS NOTES
Name: Enma Chauhan      : 1955      MRN: [id number]  Encounter Provider: Kavya Crystal PA-C  Encounter Date: 3/3/2025   Encounter department: [facility]  :  Assessment & Plan  Acute non-recurrent sinusitis, unspecified location  Patient with exam findings suggestive of an acute sinusitis.  Was just treated with doxycycline for 1 week for bronchitis.  Reports cough has improved but sinus pressure continues.  Based on examination today, highly suggestive of a significant sinusitis, will treat with antibiotics.  Patient recently had doxycycline course.  Upon record review appears she has had Levaquin in the past and has done well with that for previous sinus infections.  We reviewed side effects and dosing instructions, counseled on risks of tendon rupture and avoidance of excessive physical activity.  Patient agreeable with plan today, [facility]  She will call us when she needs an updated preoperative visit.  Orders:  •  levofloxacin (LEVAQUIN) 500 mg tablet; Take 1 tablet (500 mg total) by mouth every 24 hours for 7 days    Preoperative clearance  Based on examination today, would recommend holding off on surgery as patient and I discussed today.  Patient feels very anxious with having surgery while she is presently sick and not feeling well.  She will be reaching out to [facility] to reschedule her surgery and will call us when preop clearance is needed.  We will complete chest x-ray to rule out pneumonia with her current illness, which will also be beneficial to get completed prior to surgery.  Orders:  •  XR chest pa and lateral; Future           History of Present Illness   Patient presents today originally for preoperative clearance but is reporting today significant sinus congestion despite treatment for bronchitis last week.  She reports cough and wheezing has improved but she is having persistent sinus pressure despite treatment  with Flonase.  She reports concern with upcoming surgery as she would like to ideally feel better before she gets a major surgery.  Bilateral mastectomy is tentatively planned for 3/11/2025.      Review of Systems   Constitutional:  Positive for fever. Negative for chills and fatigue.   HENT:  Positive for congestion, sinus pressure and sinus pain. Negative for sore throat.    Respiratory:  Negative for cough, chest tightness and shortness of breath.    Cardiovascular:  Negative for chest pain, palpitations and leg swelling.   Neurological:  Negative for dizziness, light-headedness and headaches.       Objective   /80 (BP Location: Right arm, Patient Position: Sitting, Cuff Size: Large)   Pulse 71   Temp 99.2 °F (37.3 °C) (Temporal)   LMP  (LMP Unknown)   SpO2 99%      Physical Exam  Vitals and nursing note reviewed.   Constitutional:       General: She is not in acute distress.     Appearance: Normal appearance. She is normal weight. She is not ill-appearing.   HENT:      Head: Normocephalic and atraumatic.      Right Ear: Tympanic membrane normal.      Left Ear: Tympanic membrane normal.      Nose: Congestion present.      Right Turbinates: Enlarged and swollen.      Left Turbinates: Enlarged and swollen.      Right Sinus: Frontal sinus tenderness present.      Left Sinus: Frontal sinus tenderness present.      Mouth/Throat:      Mouth: Mucous membranes are moist.      Pharynx: Oropharynx is clear. No oropharyngeal exudate or posterior oropharyngeal erythema.   Cardiovascular:      Rate and Rhythm: Normal rate and regular rhythm.   Pulmonary:      Effort: Pulmonary effort is normal. No respiratory distress.      Breath sounds: Normal breath sounds. No wheezing or rhonchi.   Musculoskeletal:      Cervical back: Normal range of motion.      Right lower leg: No edema.      Left lower leg: No edema.   Lymphadenopathy:      Cervical: Cervical adenopathy present.   Skin:     General: Skin is warm and dry.       Coloration: Skin is not cyanotic or pale.   Neurological:      General: No focal deficit present.      Mental Status: She is alert and oriented to person, place, and time. Mental status is at baseline.   Psychiatric:         Mood and Affect: Mood normal.         Behavior: Behavior normal.         Judgment: Judgment normal.

## 2025-03-04 ENCOUNTER — RESULTS FOLLOW-UP (OUTPATIENT)
Dept: FAMILY MEDICINE CLINIC | Facility: CLINIC | Age: 70
End: 2025-03-04

## 2025-03-13 DIAGNOSIS — N95.2 VAGINAL ATROPHY: ICD-10-CM

## 2025-03-14 RX ORDER — ESTRADIOL 0.1 MG/G
CREAM VAGINAL
Qty: 42.5 G | Refills: 0 | OUTPATIENT
Start: 2025-03-14

## 2025-04-22 ENCOUNTER — CONSULT (OUTPATIENT)
Dept: FAMILY MEDICINE CLINIC | Facility: CLINIC | Age: 70
End: 2025-04-22
Payer: COMMERCIAL

## 2025-04-22 VITALS
TEMPERATURE: 97.6 F | BODY MASS INDEX: 35 KG/M2 | OXYGEN SATURATION: 99 % | SYSTOLIC BLOOD PRESSURE: 122 MMHG | DIASTOLIC BLOOD PRESSURE: 82 MMHG | HEART RATE: 113 BPM | WEIGHT: 179.2 LBS

## 2025-04-22 DIAGNOSIS — D05.12 DUCTAL CARCINOMA IN SITU (DCIS) OF LEFT BREAST: ICD-10-CM

## 2025-04-22 DIAGNOSIS — Z01.810 PREOP CARDIOVASCULAR EXAM: Primary | ICD-10-CM

## 2025-04-22 PROCEDURE — 99214 OFFICE O/P EST MOD 30 MIN: CPT | Performed by: FAMILY MEDICINE

## 2025-04-22 NOTE — PROGRESS NOTES
Pre-operative Clearance  Name: Enma Chauhan      : 1955      MRN: [id number]  Encounter Provider: Nasir Adelr DO  Encounter Date: 2025   Encounter department: [facility]    :  Assessment & Plan  Preop cardiovascular exam  Patient appears well today.  She has a good functional opacity and no active cardiovascular problems.  Her preop blood work, EKG as well as chest x-ray appeared clinically stable.  This type procedure is considered intermediate risk.  She is cleared with intermediate perioperative cardiovascular risk.  No further testing is needed today.       Ductal carcinoma in situ (DCIS) of left breast             Pre-operative Clearance:     Revised Cardiac Risk Index:  RCI RISK CLASS I (0 risk factors, risk of major cardiac complications approximately 0.5%)    Medication Instructions:   - Hyperlipidemia meds: Continue to take this medication on your normal schedule.       History of Present Illness     Pre-Op Examination     Surgery: BILATERAL MASTECTOMIES WITH LEFT SENTINEL NODE INJECTION (blue dye in OR)   Anticipated Date of Surgery: 25   Surgeon: Dr. Mendieta and Dr. Ramirez     Previous history of bleeding disorders or clots?: No    Previous Anesthesia reaction?: No    Prolonged steroid use in the last 6 months?: No      Assessment of Cardiac Risk:   - Unstable or severe angina or MI in the last 6 weeks or history of stent placement in the last year?: No    - Decompensated heart failure (e.g. New onset heart failure, NYHA  Class IV heart failure, or worsening existing heart failure)?: No    - Significant arrhythmias such as high grade AV block, symptomatic ventricular arrhythmia, newly recognized ventricular tachycardia, supraventricular tachycardia with resting heart rate >100, or symptomatic bradycardia?: No    - Severe heart valve disease including aortic stenosis or symptomatic mitral stenosis?: No      Pre-operative Risk Factors:  - Elevated-risk  surgery: No    - History of cerebrovascular disease: No    - History of ischemic heart disease: No    - History of congestive heart failure: No    - Pre-operative treatment with insulin: No    - Pre-operative creatinine >2 mg/dL: No      Duke Activity Status Index (DASI):    - DASI Total Score: 42.7   - METs: 8     Review of Systems   Constitutional:  Negative for activity change, chills, fatigue and fever.   HENT:  Negative for congestion, ear pain, sinus pressure and sore throat.    Eyes:  Negative for redness, itching and visual disturbance.   Respiratory:  Negative for cough and shortness of breath.    Cardiovascular:  Negative for chest pain and palpitations.   Gastrointestinal:  Negative for abdominal pain, diarrhea and nausea.   Endocrine: Negative for cold intolerance and heat intolerance.   Genitourinary:  Negative for dysuria, flank pain and frequency.   Musculoskeletal:  Negative for arthralgias, back pain, gait problem and myalgias.   Skin:  Negative for color change.   Allergic/Immunologic: Negative for environmental allergies.   Neurological:  Negative for dizziness, numbness and headaches.   Psychiatric/Behavioral:  Negative for behavioral problems and sleep disturbance.      Past Medical History   Past Medical History:   Diagnosis Date    Breast cancer (HCC) 01/10/2025    left DCIS    Hyperlipidemia     Kidney stone     Tibia/fibula fracture 2016    ORIF     Past Surgical History:   Procedure Laterality Date    BREAST CYST ASPIRATION Left     2019     SECTION      x3:  10/30/1977(F), 1980 (F), 1981 (M)    COLONOSCOPY      HYSTERECTOMY N/A     age 44    LEG SURGERY Right     fracture    MAMMO STEREOTACTIC BREAST BIOPSY LEFT (ALL INC) Left 2024    MRI BREAST BIOPSY LEFT (ALL INCLUSIVE) Left 01/10/2025    MRI BREAST BIOPSY LEFT (ALL INCLUSIVE) Left 2025     Family History   Problem Relation Age of Onset    Asthma Mother     Breast cancer Mother 40    Diabetes Mother      No Known Problems Father     Eczema Daughter     No Known Problems Daughter     Breast cancer Maternal Grandmother 50    No Known Problems Maternal Grandfather     Diabetes Paternal Grandmother     No Known Problems Paternal Grandfather     No Known Problems Paternal Aunt     No Known Problems Paternal Aunt     No Known Problems Paternal Aunt     Colon cancer Neg Hx     Endometrial cancer Neg Hx     Ovarian cancer Neg Hx     Breast cancer additional onset Neg Hx     BRCA2 Positive Neg Hx     BRCA2 Negative Neg Hx     BRCA1 Positive Neg Hx     BRCA1 Negative Neg Hx     BRCA 1/2 Neg Hx      Social History     Tobacco Use    Smoking status: Never    Smokeless tobacco: Never   Vaping Use    Vaping status: Never Used   Substance and Sexual Activity    Alcohol use: No    Drug use: No    Sexual activity: Not Currently     Partners: Male     Current Outpatient Medications on File Prior to Visit   Medication Sig    albuterol (PROVENTIL HFA,VENTOLIN HFA) 90 mcg/act inhaler Inhale 2 puffs every 6 (six) hours as needed for wheezing (cough)    ASPIRIN ADULT LOW DOSE PO Take 81 mg by mouth daily    benzonatate (TESSALON) 200 MG capsule Take 1 capsule (200 mg total) by mouth 3 (three) times a day as needed for cough    cetirizine (ZyrTEC) 10 mg tablet Take 1 tablet (10 mg total) by mouth daily    ezetimibe (ZETIA) 10 mg tablet Take 1 tablet (10 mg total) by mouth daily    fluticasone (FLONASE) 50 mcg/act nasal spray 2 sprays into each nostril daily    ketoconazole (NIZORAL) 2 % shampoo     Omega-3 Fatty Acids (OMEGA 3 PO) Take 1 capsule by mouth daily    Spacer/Aero-Holding Chambers (Vortex Valved Holding Chamber) SAHIL Use 2 (two) times a day    Fluticasone-Salmeterol (Advair Diskus) 100-50 mcg/dose inhaler Inhale 1 puff 2 (two) times a day Rinse mouth after use.     Allergies   Allergen Reactions    Simvastatin Other (See Comments)     MUSCLE WEAKNESS    Statins Other (See Comments)     Objective   /82 (BP Location: Left  arm, Patient Position: Sitting, Cuff Size: Large)   Pulse (!) 113   Temp 97.6 °F (36.4 °C) (Temporal)   Wt 81.3 kg (179 lb 3.2 oz)   LMP  (LMP Unknown)   SpO2 99%   BMI 35.00 kg/m²     Physical Exam  Vitals reviewed.   Constitutional:       General: She is not in acute distress.     Appearance: Normal appearance. She is well-developed.   HENT:      Head: Normocephalic and atraumatic.      Right Ear: Tympanic membrane, ear canal and external ear normal. There is no impacted cerumen.      Left Ear: Tympanic membrane, ear canal and external ear normal. There is no impacted cerumen.      Nose: Nose normal. No congestion or rhinorrhea.      Mouth/Throat:      Mouth: Mucous membranes are moist.      Pharynx: No oropharyngeal exudate or posterior oropharyngeal erythema.   Eyes:      General: No scleral icterus.        Right eye: No discharge.         Left eye: No discharge.      Extraocular Movements: Extraocular movements intact.      Conjunctiva/sclera: Conjunctivae normal.      Pupils: Pupils are equal, round, and reactive to light.   Neck:      Trachea: No tracheal deviation.   Cardiovascular:      Rate and Rhythm: Normal rate and regular rhythm.      Pulses: Normal pulses.           Dorsalis pedis pulses are 2+ on the right side and 2+ on the left side.        Posterior tibial pulses are 2+ on the right side and 2+ on the left side.      Heart sounds: Normal heart sounds. No murmur heard.     No friction rub. No gallop.   Pulmonary:      Effort: Pulmonary effort is normal. No respiratory distress.      Breath sounds: Normal breath sounds. No wheezing, rhonchi or rales.   Abdominal:      General: Bowel sounds are normal. There is no distension.      Palpations: Abdomen is soft.      Tenderness: There is no abdominal tenderness. There is no guarding or rebound.   Musculoskeletal:         General: Normal range of motion.      Cervical back: Normal range of motion and neck supple.      Right lower leg: No edema.       Left lower leg: No edema.   Lymphadenopathy:      Head:      Right side of head: No submental or submandibular adenopathy.      Left side of head: No submental or submandibular adenopathy.      Cervical: No cervical adenopathy.      Right cervical: No superficial, deep or posterior cervical adenopathy.     Left cervical: No superficial, deep or posterior cervical adenopathy.   Skin:     General: Skin is warm and dry.      Findings: No erythema.   Neurological:      General: No focal deficit present.      Mental Status: She is alert and oriented to person, place, and time.      Cranial Nerves: No cranial nerve deficit.      Sensory: Sensation is intact. No sensory deficit.      Motor: Motor function is intact.   Psychiatric:         Attention and Perception: Attention and perception normal.         Mood and Affect: Mood is not anxious or depressed.         Speech: Speech normal.         Behavior: Behavior normal.         Thought Content: Thought content normal.         Judgment: Judgment normal.         Nasir Adler,

## 2025-05-06 NOTE — PROGRESS NOTES
Virtual Brief Visit  Name: Enma Chauhan      : 1955      MRN: [id number]  Encounter Provider: Emmanuel Campbell MD  Encounter Date: 2025   Encounter department: [facility]  :  Assessment & Plan  Ductal carcinoma in situ (DCIS) of left breast    69F with family history of premenopausal breast cancer, personal history of left breast atypical lobular hyperplasia (standard clip, 12 oclock) in the setting of evolving calcifications seen on screening mammogram in . We opted for close surveillance initially in her case but did discuss the role of surgical excisional biopsy to rule out upgrade to malignancy.     We initiated high risk screening and her first breast MRI recently resulted with extensive bilateral nodular enhancement throughout both breasts with second look US showing a 9mm mass at 3oclock, no major US correlate for some of the higher risk findings on the MRI. An MRI biopsy for the 2 oclock 6mm mass with rapid enhancement on MRI was recommended. The MRI guided biopsy showed DCIS, grade 1, ER/[place]+. Additional 2 site MRI biopsies showed atypical ductal hyperplasia at both 3 oclock and 5 o clock. Genetic testing negative.     We reviewed the diagnosis of DCIS in detail at her prior visit and discussed the modern, individualized, multidisciplinary approach to breast cancer care. We reviewed her specific case, I gave her written handouts and the breast cancer handbook, all questions were answered. She will stop her vaginal estrace cream. She previously communicated that she strongly prefers a breast conservation approach if she is a candidate for one. She did meet with Dr. Ramirez, plastic surgery, to learn about oncoplastic breast conservation options as well as breast reconstruction options after mastectomies.     We discussed her case at our tumor board regarding her 3 sites of atypia and one site of DCIS and the surgical approach, localization options, the  background nodular enhancement that was seen on MRI bilaterally. She has standard clips at all of the prior biopsy sites x 4. The group recommended a breast conservation approach focusing on the biopsy proven DCIS with ongoing high risk screening and surveillance for the other sites with postoperative endocrine therapy for additional risk reduction. She will need a jennifer reflector placement if she decides to proceed with this approach.     She and her family are reviewing all of the information and her options, she has obtained a second opinion with [facility] and is considering mastectomies with reconstruction as well. This is reasonable in light of her multifocal atypia and her family history of malignancy. I support her in whatever decision she makes. She will reach out to us with her decision in the coming days.           History of Present Illness      No new complaints clinically, genetics negative, MRI biopsies showed ADH x 2, has prior ALH and DCIS sites as well, all in the same breast. Per tumor board discussion, group recommends breast conservation approach with focus on the DCIS site and surveillance/chemoprevention plan for the other sites.   History of Present Illness        Administrative Statements   Encounter provider Emmanuel Campbell MD    The Patient is located at Home and in the following state in which I hold an active license [place].    The patient was identified by name and date of birth. Enma BIRCH Tien was informed that this is a telemedicine visit and that the visit is being conducted through Telephone.  My office door was closed. No one else was in the room.  She acknowledged consent and understanding of privacy and security of the video platform. The patient has agreed to participate and understands they can discontinue the visit at any time.    I have spent a total time of 30 minutes in caring for this patient on the day of the visit/encounter including Prognosis, Risks and benefits of tx  options, Instructions for management, Patient and family education, Impressions, Counseling / Coordination of care, Reviewing/placing orders in the medical record (including tests, medications, and/or procedures), and Obtaining or reviewing history  , not including the time spent for establishing the audio/video connection.

## 2025-05-06 NOTE — ASSESSMENT & PLAN NOTE
69F with family history of premenopausal breast cancer, personal history of left breast atypical lobular hyperplasia (standard clip, 12 oclock) in the setting of evolving calcifications seen on screening mammogram in 2024. We opted for close surveillance initially in her case but did discuss the role of surgical excisional biopsy to rule out upgrade to malignancy.     We initiated high risk screening and her first breast MRI recently resulted with extensive bilateral nodular enhancement throughout both breasts with second look US showing a 9mm mass at 3oclock, no major US correlate for some of the higher risk findings on the MRI. An MRI biopsy for the 2 oclock 6mm mass with rapid enhancement on MRI was recommended. The MRI guided biopsy showed DCIS, grade 1, ER/[place]+. Additional 2 site MRI biopsies showed atypical ductal hyperplasia at both 3 oclock and 5 o clock. Genetic testing negative.     We reviewed the diagnosis of DCIS in detail at her prior visit and discussed the modern, individualized, multidisciplinary approach to breast cancer care. We reviewed her specific case, I gave her written handouts and the breast cancer handbook, all questions were answered. She will stop her vaginal estrace cream. She previously communicated that she strongly prefers a breast conservation approach if she is a candidate for one. She did meet with Dr. Ramirez, plastic surgery, to learn about oncoplastic breast conservation options as well as breast reconstruction options after mastectomies.     We discussed her case at our tumor board regarding her 3 sites of atypia and one site of DCIS and the surgical approach, localization options, the background nodular enhancement that was seen on MRI bilaterally. She has standard clips at all of the prior biopsy sites x 4. The group recommended a breast conservation approach focusing on the biopsy proven DCIS with ongoing high risk screening and surveillance for the other sites with  postoperative endocrine therapy for additional risk reduction. She will need a jennifer reflector placement if she decides to proceed with this approach.    She and her family are reviewing all of the information and her options, she has obtained a second opinion with [facility] and is considering mastectomies with reconstruction as well. This is reasonable in light of her multifocal atypia and her family history of malignancy. I support her in whatever decision she makes. She will reach out to us with her decision in the coming days.

## 2025-07-28 ENCOUNTER — TELEPHONE (OUTPATIENT)
Age: 70
End: 2025-07-28

## 2025-08-06 ENCOUNTER — OFFICE VISIT (OUTPATIENT)
Dept: FAMILY MEDICINE CLINIC | Facility: CLINIC | Age: 70
End: 2025-08-06
Payer: COMMERCIAL

## 2025-08-06 VITALS
HEIGHT: 60 IN | BODY MASS INDEX: 35.22 KG/M2 | OXYGEN SATURATION: 99 % | DIASTOLIC BLOOD PRESSURE: 76 MMHG | HEART RATE: 64 BPM | SYSTOLIC BLOOD PRESSURE: 124 MMHG | WEIGHT: 179.4 LBS | TEMPERATURE: 97.4 F

## 2025-08-06 DIAGNOSIS — D05.12 DUCTAL CARCINOMA IN SITU (DCIS) OF LEFT BREAST: ICD-10-CM

## 2025-08-06 DIAGNOSIS — Z01.818 PRE-OP EXAMINATION: Primary | ICD-10-CM

## 2025-08-06 DIAGNOSIS — J30.1 SEASONAL ALLERGIC RHINITIS DUE TO POLLEN: ICD-10-CM

## 2025-08-06 DIAGNOSIS — F41.9 ANXIETY: ICD-10-CM

## 2025-08-06 PROCEDURE — 99214 OFFICE O/P EST MOD 30 MIN: CPT

## 2025-08-06 PROCEDURE — 93000 ELECTROCARDIOGRAM COMPLETE: CPT

## 2025-08-06 RX ORDER — GABAPENTIN 100 MG/1
100 CAPSULE ORAL DAILY
COMMUNITY
Start: 2025-07-23

## 2025-08-06 RX ORDER — DIAZEPAM 5 MG/1
5 TABLET ORAL
COMMUNITY
Start: 2025-04-30

## 2025-08-06 RX ORDER — MONTELUKAST SODIUM 10 MG/1
10 TABLET ORAL
Qty: 90 TABLET | Refills: 1 | Status: SHIPPED | OUTPATIENT
Start: 2025-08-06

## 2025-08-06 RX ORDER — LORAZEPAM 1 MG/1
1 TABLET ORAL DAILY PRN
Qty: 30 TABLET | Refills: 0 | Status: SHIPPED | OUTPATIENT
Start: 2025-08-06

## 2025-08-06 RX ORDER — LORAZEPAM 1 MG/1
1 TABLET ORAL
COMMUNITY
Start: 2025-05-07 | End: 2025-08-06 | Stop reason: SDUPTHER

## 2025-08-12 ENCOUNTER — TELEPHONE (OUTPATIENT)
Age: 70
End: 2025-08-12